# Patient Record
Sex: FEMALE | Race: WHITE | NOT HISPANIC OR LATINO | Employment: FULL TIME | URBAN - METROPOLITAN AREA
[De-identification: names, ages, dates, MRNs, and addresses within clinical notes are randomized per-mention and may not be internally consistent; named-entity substitution may affect disease eponyms.]

---

## 2017-01-03 ENCOUNTER — TRANSFERRED RECORDS (OUTPATIENT)
Dept: HEALTH INFORMATION MANAGEMENT | Facility: CLINIC | Age: 37
End: 2017-01-03

## 2017-02-26 ENCOUNTER — OFFICE VISIT (OUTPATIENT)
Dept: URGENT CARE | Facility: URGENT CARE | Age: 37
End: 2017-02-26
Payer: COMMERCIAL

## 2017-02-26 VITALS
SYSTOLIC BLOOD PRESSURE: 102 MMHG | TEMPERATURE: 98.7 F | HEART RATE: 76 BPM | WEIGHT: 132.7 LBS | BODY MASS INDEX: 20.83 KG/M2 | OXYGEN SATURATION: 98 % | DIASTOLIC BLOOD PRESSURE: 62 MMHG | HEIGHT: 67 IN

## 2017-02-26 DIAGNOSIS — R07.0 THROAT PAIN: Primary | ICD-10-CM

## 2017-02-26 DIAGNOSIS — J06.9 VIRAL URI WITH COUGH: ICD-10-CM

## 2017-02-26 LAB
DEPRECATED S PYO AG THROAT QL EIA: NORMAL
MICRO REPORT STATUS: NORMAL
SPECIMEN SOURCE: NORMAL

## 2017-02-26 PROCEDURE — 87081 CULTURE SCREEN ONLY: CPT | Performed by: FAMILY MEDICINE

## 2017-02-26 PROCEDURE — 99213 OFFICE O/P EST LOW 20 MIN: CPT | Performed by: FAMILY MEDICINE

## 2017-02-26 PROCEDURE — 87880 STREP A ASSAY W/OPTIC: CPT | Performed by: FAMILY MEDICINE

## 2017-02-26 RX ORDER — CODEINE PHOSPHATE AND GUAIFENESIN 10; 100 MG/5ML; MG/5ML
1 SOLUTION ORAL EVERY 4 HOURS PRN
Qty: 120 ML | Refills: 0 | Status: SHIPPED | OUTPATIENT
Start: 2017-02-26 | End: 2017-04-20

## 2017-02-26 NOTE — MR AVS SNAPSHOT
After Visit Summary   2/26/2017    Blas Perkins    MRN: [id number]           Patient Information     Date Of Birth          1980        Visit Information        Provider Department      2/26/2017 11:00 AM Gia Alvarez MD [facility]        Today's Diagnoses     Throat pain    -  1    Viral URI with cough           Follow-ups after your visit        Your next 10 appointments already scheduled     Mar 13, 2017  7:45 AM CDT   (Arrive by 7:30 AM)   RETURN PLASTICS with Jorge Luis Desir MD   [facility] Ophthalmology ([facility])    [address]  4th Floor  [facility] [id number] [phone]            Apr 14, 2017  8:30 AM CDT   RETURN NEURO with Felix Tidwell MD   Eye Clinic ([facility])    [facility]  [address]  [address] [address]  [facility] [id number] [phone]              Who to contact     If you have questions or need follow up information about today's clinic visit or your schedule please contact [facility] directly at [phone].  Normal or non-critical lab and imaging results will be communicated to you by MyChart, letter or phone within 4 business days after the clinic has received the results. If you do not hear from us within 7 days, please contact the clinic through MyChart or phone. If you have a critical or abnormal lab result, we will notify you by phone as soon as possible.  Submit refill requests through [facility] or call your pharmacy and they will forward the refill request to us. Please allow 3 business days for your refill to be completed.          Additional Information About Your Visit        MyChart Information     [facility] gives you secure access to your electronic health record. If you see a primary care provider, you can also send messages to your care team and make appointments. If you have questions, please call your primary  "care clinic.  If you do not have a primary care provider, please call [phone] and they will assist you.        Care EveryWhere ID     This is your Care EveryWhere ID. This could be used by other organizations to access your [place] medical records  [id number]        Your Vitals Were     Pulse Temperature Height Pulse Oximetry BMI (Body Mass Index)       76 98.7  F (37.1  C) (Oral) 5' 7\" (1.702 m) 98% 20.78 kg/m2        Blood Pressure from Last 3 Encounters:   02/26/17 102/62   12/21/15 100/54   11/30/15 110/62    Weight from Last 3 Encounters:   02/26/17 132 lb 11.2 oz (60.2 kg)   12/21/15 139 lb (63 kg)   11/30/15 134 lb (60.8 kg)              We Performed the Following     Beta strep group A culture     Rapid strep screen          Today's Medication Changes          These changes are accurate as of: 2/26/17 12:11 PM.  If you have any questions, ask your nurse or doctor.               Start taking these medicines.        Dose/Directions    guaiFENesin-codeine 100-10 MG/5ML Soln solution   Commonly known as:  ROBITUSSIN AC   Used for:  Viral URI with cough   Started by:  Gia Alvarez MD        Dose:  1 tsp.   Take 5 mLs by mouth every 4 hours as needed for cough   Quantity:  120 mL   Refills:  0            Where to get your medicines      Some of these will need a paper prescription and others can be bought over the counter.  Ask your nurse if you have questions.     Bring a paper prescription for each of these medications     guaiFENesin-codeine 100-10 MG/5ML Soln solution                Primary Care Provider Office Phone # Fax #    Dong Antonio [phone]       [facility] [address]  [facility] [address]        Thank you!     Thank you for choosing [facility]  for your care. Our goal is always to provide you with excellent care. Hearing back from our patients is one way we can continue to improve our services. Please take a few minutes to " complete the written survey that you may receive in the mail after your visit with us. Thank you!             Your Updated Medication List - Protect others around you: Learn how to safely use, store and throw away your medicines at www.disposemymeds.org.          This list is accurate as of: 2/26/17 12:11 PM.  Always use your most recent med list.                   Brand Name Dispense Instructions for use    ARTIFICIAL TEAR OP      Apply to eye At Bedtime       guaiFENesin-codeine 100-10 MG/5ML Soln solution    ROBITUSSIN AC    120 mL    Take 5 mLs by mouth every 4 hours as needed for cough       phenytoin 100 MG CR capsule    DILANTIN

## 2017-02-26 NOTE — NURSING NOTE
"Chief Complaint   Patient presents with     Pharyngitis     sore throat, fever 3x days        Initial /62  Pulse 76  Temp 98.7  F (37.1  C) (Oral)  Ht 5' 7\" (1.702 m)  Wt 132 lb 11.2 oz (60.2 kg)  SpO2 98%  BMI 20.78 kg/m2 Estimated body mass index is 20.78 kg/(m^2) as calculated from the following:    Height as of this encounter: 5' 7\" (1.702 m).    Weight as of this encounter: 132 lb 11.2 oz (60.2 kg).  Medication Reconciliation: complete    "

## 2017-02-28 LAB
BACTERIA SPEC CULT: NORMAL
MICRO REPORT STATUS: NORMAL
SPECIMEN SOURCE: NORMAL

## 2017-03-13 ENCOUNTER — OFFICE VISIT (OUTPATIENT)
Dept: OPHTHALMOLOGY | Facility: CLINIC | Age: 37
End: 2017-03-13

## 2017-03-13 VITALS — WEIGHT: 130 LBS | BODY MASS INDEX: 20.4 KG/M2 | HEIGHT: 67 IN

## 2017-03-13 DIAGNOSIS — D32.9 MENINGIOMA OF RIGHT SPHENOID WING INVOLVING CAVERNOUS SINUS (H): Primary | ICD-10-CM

## 2017-03-13 DIAGNOSIS — H05.20 PROPTOSIS: ICD-10-CM

## 2017-03-13 ASSESSMENT — VISUAL ACUITY
OS_CC+: -1
CORRECTION_TYPE: CONTACTS
OD_CC+: -1
OD_CC: 20/20
OS_CC: 20/20
METHOD: SNELLEN - LINEAR

## 2017-03-13 ASSESSMENT — MARGIN REFLEX DISTANCE
OD_MRD1: 4
OS_MRD1: 5

## 2017-03-13 ASSESSMENT — EXTERNAL EXAM - RIGHT EYE: OD_EXAM: PROPTOSIS

## 2017-03-13 ASSESSMENT — TONOMETRY
IOP_METHOD: ICARE
OS_IOP_MMHG: 13
OD_IOP_MMHG: 15

## 2017-03-13 ASSESSMENT — CONF VISUAL FIELD
OD_NORMAL: 1
METHOD: COUNTING FINGERS
OS_NORMAL: 1

## 2017-03-13 ASSESSMENT — SLIT LAMP EXAM - LIDS
COMMENTS: NORMAL
COMMENTS: NORMAL

## 2017-03-13 ASSESSMENT — LAGOPHTHALMOS
OS_LAGOPHTHALMOS: 0
OD_LAGOPHTHALMOS: 0

## 2017-03-13 NOTE — Clinical Note
Samuel Chow I'm signing her up for decompression. 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 Thanks Aayush

## 2017-03-13 NOTE — MR AVS SNAPSHOT
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)    -  1    Proptosis           Follow-ups after your visit        Your next 10 appointments already scheduled     Apr 14, 2017  8:30 AM CDT   RETURN NEURO with Felix Tidwell MD   Eye Clinic ([facility])    [address]  [address] [facility]  9th Fl Clin 9a  [facility] [id number] [phone]            Apr 20, 2017  1:00 PM CDT   (Arrive by 12:45 PM)   New Patient Visit with Blake Medina MD   [facility] Ear Nose and Throat ([facility])    [address]  4th Floor  [facility] [address] [phone]              Future tests that were ordered for you today     Open Future Orders        Priority Expected Expires Ordered    CT Maxillofacial w/o Contrast Routine  3/13/2018 3/13/2017            Who to contact     Please call your clinic at [phone] to:    Ask questions about your health    Make or cancel appointments    Discuss your medicines    Learn about your test results    Speak to your doctor   If you have compliments or concerns about an experience at your clinic, or if you wish to file a complaint, please contact [facility] Patient Relations at [phone] or email us at [email or web address]         Additional Information About Your Visit        MyChart Information     [employer] gives you secure access to your electronic health record. If you see a primary care provider, you can also send messages to your care team and make appointments. If you have questions, please call your primary care clinic.  If you do not have a primary care  "provider, please call [phone] and they will assist you.      [employer] is an electronic gateway that provides easy, online access to your medical records. With [employer], you can request a clinic appointment, read your test results, renew a prescription or communicate with your care team.     To access your existing account, please contact your [facility] or call [phone] for assistance.        Care EveryWhere ID     This is your Care EveryWhere ID. This could be used by other organizations to access your [place] medical records  [id number]        Your Vitals Were     Height BMI (Body Mass Index)                1.695 m (5' 6.75\") 20.51 kg/m2           Blood Pressure from Last 3 Encounters:   02/26/17 102/62   12/21/15 100/54   11/30/15 110/62    Weight from Last 3 Encounters:   03/13/17 59 kg (130 lb)   02/26/17 60.2 kg (132 lb 11.2 oz)   12/21/15 63 kg (139 lb)              We Performed the Following     External Photos OU (both eyes)     Renée-Operative Worksheet (Plastics)        Primary Care Provider Office Phone # Fax #    Dong Antonio [phone]       [facility] [address] [place] [address]        Thank you!     Thank you for choosing [facility] OPHTHALMOLOGY  for your care. Our goal is always to provide you with excellent care. Hearing back from our patients is one way we can continue to improve our services. Please take a few minutes to complete the written survey that you may receive in the mail after your visit with us. Thank you!             Your Updated Medication List - Protect others around you: Learn how to safely use, store and throw away your medicines at www.disposemymeds.org.          This list is accurate as of: 3/13/17  8:39 AM.  Always use your most recent med list.                   Brand Name Dispense Instructions for use    ARTIFICIAL TEAR OP      Apply to eye At Bedtime       guaiFENesin-codeine 100-10 MG/5ML Soln " solution    ROBITUSSIN AC    120 mL    Take 5 mLs by mouth every 4 hours as needed for cough       phenytoin 100 MG CR capsule    DILANTIN

## 2017-03-13 NOTE — PROGRESS NOTES
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  She has not noticed any significant changes to her vision or her eye position.      Assessment & Plan     Blas Perkins is a 36 year old female with the following diagnoses:   1. Proptosis       PLAN:  Right orbital decompression - medial and floor with magnolia and sonopet             Attending Physician Attestation:  I have seen and examined this patient .  I have confirmed and edited as necessary the chief complaint(s), history of present illness, review of systems, relevant history, and examination findings as documented by others.  I have personally reviewed the relevant tests, images, and reports as documented above.  I have confirmed and edited as necessary the assessment and plan and agree with this note.    - Jorge Luis Desir MD 8:28 AM 3/13/2017    Photographs were obtained to document the findings recorded under exam. These will be stored for future reference and comparison. The plan is documented under assessment and plan above.   - Jorge Luis Desir MD 8:28 AM 3/13/2017      Today with Blas Perkins, I reviewed the indications, risks, benefits, and alternatives of the proposed surgical procedure including, but not limited to, failure obtain the desired result  and need for additional surgery, bleeding, infection, loss of vision, loss of the eye, and the remote possibility of permanent damage to any organ system or death with the use of anesthesia.  I provided multiple opportunities for the questions, answered all questions to the best of my ability, and confirmed that my answers and my discussion were understood.     - Jorge Luis Desir MD 8:28 AM 3/13/2017

## 2017-04-12 ENCOUNTER — PRE VISIT (OUTPATIENT)
Dept: OTOLARYNGOLOGY | Facility: CLINIC | Age: 37
End: 2017-04-12

## 2017-04-12 NOTE — TELEPHONE ENCOUNTER
1.  Date/reason for appt:4/20/17, Orbital decompression eval  2.  Referring provider:ADDIE SWIFT  3.  Call to patient (Yes / No - short description): No, referred  4.  Previous care at / records requested from:   [facility]- office notes and imaging are in epic.

## 2017-04-20 ENCOUNTER — OFFICE VISIT (OUTPATIENT)
Dept: OTOLARYNGOLOGY | Facility: CLINIC | Age: 37
End: 2017-04-20

## 2017-04-20 DIAGNOSIS — H05.20 PROPTOSIS: Primary | ICD-10-CM

## 2017-04-20 RX ORDER — MULTIPLE VITAMINS W/ MINERALS TAB 9MG-400MCG
1 TAB ORAL DAILY
COMMUNITY
End: 2017-12-20

## 2017-04-20 NOTE — PATIENT INSTRUCTIONS
Plan of care:  You will here from Carolyn about a surgery date/time and a post op visit  Clinic contact information:  1. To schedule an appointment call [phone], option 1  2. To talk to the Triage RN call [phone], option 3  3. If you need to speak to Christina or get a message to your doctor on a Friday, call the triage RN  4. ChristinaRN: [phone]  5. Surgery scheduling:      Veda Figueroa: [phone]      Carolyn Shrestha: [phone]  6. Fax: [phone]  7. [phone]    Pre-op reminders:  1. Complete pre-op H+P within 30 days of surgery (recommend pre-op appointment 2 weeks prior to surgery date).   2.  needed on day of surgery.   3.Discuss all medications, including blood-thinning medications with PCP at pre-op appointment (Coumadin, Plavix, Aspirin, Ibuprofen/Motrin, Vitamin E and Fish Oil), which may need to be discontinued 7 days prior to surgery date.   4. Nothing to eat or drink after midnight the night before surgery.   5. Take shower or bath the morning of surgery and remove deodorant, cologne, scented lotion, makeup, nail polish and jewelry.     r1

## 2017-04-20 NOTE — LETTER
4/20/2017       RE: Blas Perkins  [address] [facility] [address]     Dear Colleague,    Thank you for referring your patient, Blas Perkins, to the [facility] EAR NOSE AND THROAT at [facility]. Please see a copy of my visit note below.      HISTORY OF PRESENT ILLNESS:  Ms. 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  She had a resection in September of 2015.  She had followup reconstructive surgery in December, 2015.  She subsequently had a fractionated CyberKnife radiosurgery to residual and growing tumor in September of 2016.  She has had stable vision and stable eye position but continues to have proptosis.  She was seen by Dr. Desir recently who recommended a right orbital decompression with an endoscopic approach to the medial wall and Dr. Desir performed an approach to the floor.  She is here for evaluation and recommendations.  Other than her meningioma she is quite healthy.  Denies having any medications other than multivitamins and artificial tears.      Last 2 Scores for Patient-Answered VHI Questionnaire  VHI Total Score 4/17/2017   VHI Total Score 10         Last 2 Scores for Patient-Answered CSI Questionnaire  CSI Total Score 4/17/2017   CSI Total Score 0       Last 2 Scores for Patient-Answered EAT Questionnaire  EAT Total Score 4/17/2017   EAT Total Score 0           PAST MEDICAL HISTORY:   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      OPTICAL TRACKING SYSTEM CRANIOTOMY, EXCISE TUMOR, COMBINED Right 9/24/2014    Procedure: COMBINED OPTICAL TRACKING SYSTEM CRANIOTOMY, EXCISE TUMOR;  Surgeon: Austin Mccallum MD;  Location:  OR       FAMILY HISTORY:   Family History   Problem Relation Age of Onset     Family History Negative Mother      Substance Abuse Mother      Family History Negative Father      Family History Negative Sister      Family History Negative Brother      DIABETES Maternal Grandfather      Substance Abuse Maternal Grandfather      Substance Abuse Maternal Grandmother      Unknown/Adopted Paternal Grandmother      Unknown/Adopted Paternal Grandfather      Glaucoma No family hx of      Macular Degeneration No family hx of        SOCIAL HISTORY:   Social History   Substance Use Topics     Smoking status: Current Some Day Smoker     Packs/day: 0.01     Years: 10.00     Types: Cigarettes     Start date: 12/20/1997     Smokeless tobacco: Never Used      Comment: 2-3 cigarettes daily     Alcohol use No      Comment: daily x 5 years-at least a bottle of wine   11/19/13 No alcohol use       REVIEW OF SYSTEMS: Ten point review of systems was performed and is negative except for:   [facility] ROS 4/7/2017   Eyes Visual loss        ALLERGIES: Chantix [varenicline]    MEDICATIONS:   Current Outpatient Prescriptions   Medication Sig Dispense Refill     multivitamin, therapeutic with minerals (MULTI-VITAMIN) TABS tablet Take 1 tablet by mouth daily       ARTIFICIAL TEAR OP Apply to eye At Bedtime             PHYSICAL EXAMINATION:  On examination today, she is awake, alert, in no apparent distress.  She has some mild  retraction of her tympanic membranes prior to prior infections.  Nasal exam shows a left septal spur and deviation of the nose to the right.  Examination of the oral cavity shows no suspicious lesions.  There is symmetric movement of the tongue and soft palate.  Pulse is regular.  Upper airway is clear.  Cranial nerves II-XII are grossly intact.      Nasal endoscopy was performed following topical anesthesia with 3% lidocaine and 0.25% phenylephrine.  Scope was passed bilaterally.  There is good access to the ostiomeatal complex on the right side.  No intranasal polyps or masses are seen.  On the left side, there is a septal spur, which is partially obstructing.  Again, no masses or suspicious lesions are seen.      IMAGING:  We did review her CT scan with her, which was obtained on March, 2017 demonstrating the reconstruction as well as the sinus and orbital anatomy.  I did review with her the nature of the procedure and what would be removed, the potential complications and the expected results.  She is quite understanding.  All questions were answered.  I believe she is a good candidate from an anatomic standpoint and we will be scheduling a time in conjunction with Dr. Desir for an orbital decompression with image guidance.       Again, thank you for allowing me to participate in the care of your patient.      Sincerely,    Blake Medina MD       cc: Jorge Luis Desir MD     [facility] [address]        Dong Antonio MD     [facility]     [address]  [address]       [place]/josue

## 2017-04-20 NOTE — NURSING NOTE
Chief Complaint   Patient presents with     Consult     New Orbital decompression eval      Pt states no pain today.    N Geronimo DUDLEYN

## 2017-04-20 NOTE — MR AVS SNAPSHOT
After Visit Summary   2017    Blas Perkins    MRN: [id number]           Patient Information     Date Of Birth          1980        Visit Information        Provider Department      2017 1:00 PM Blake Medina MD [facility] Ear Nose and Throat        Care Instructions    Plan of care:  You will here from Carolyn about a surgery date/time and a post op visit  Clinic contact information:  1. To schedule an appointment call [phone], option 1  2. To talk to the Triage RN call [phone], option 3  3. If you need to speak to Christina or get a message to your doctor on a Friday, call the triage RN  4. ChristinaRN: [phone]  5. Surgery scheduling:      Veda Figueroa: [phone]      Carolyn Shrestha: [phone]  6. Fax: [phone]  7. [phone]    Pre-op reminders:  1. Complete pre-op H+P within 30 days of surgery (recommend pre-op appointment 2 weeks prior to surgery date).   2.  needed on day of surgery.   3.Discuss all medications, including blood-thinning medications with PCP at pre-op appointment (Coumadin, Plavix, Aspirin, Ibuprofen/Motrin, Vitamin E and Fish Oil), which may need to be discontinued 7 days prior to surgery date.   4. Nothing to eat or drink after midnight the night before surgery.   5. Take shower or bath the morning of surgery and remove deodorant, cologne, scented lotion, makeup, nail polish and jewelry.             Follow-ups after your visit        Your next 10 appointments already scheduled     2017  3:00 PM CDT   RETURN NEURO with Felix Tidwell MD   Eye Clinic ([facility])    [facility]  [address]  [address] [address] [place] [address] [phone]              Who to contact     Please call your clinic at [phone] to:    Ask questions about your health    Make or cancel appointments    Discuss your medicines    Learn about your test results    Speak to your doctor   If you  have compliments or concerns about an experience at your clinic, or if you wish to file a complaint, please contact [facility] Patient Relations at [phone] or email us at [email or web address]         Additional Information About Your Visit        MyChart Information     [employer] gives you secure access to your electronic health record. If you see a primary care provider, you can also send messages to your care team and make appointments. If you have questions, please call your primary care clinic.  If you do not have a primary care provider, please call [phone] and they will assist you.      [employer] is an electronic gateway that provides easy, online access to your medical records. With [employer], you can request a clinic appointment, read your test results, renew a prescription or communicate with your care team.     To access your existing account, please contact your [facility] or call [phone] for assistance.        Care EveryWhere ID     This is your Care EveryWhere ID. This could be used by other organizations to access your [place] medical records  [id number]         Blood Pressure from Last 3 Encounters:   02/26/17 102/62   12/21/15 100/54   11/30/15 110/62    Weight from Last 3 Encounters:   03/13/17 59 kg (130 lb)   02/26/17 60.2 kg (132 lb 11.2 oz)   12/21/15 63 kg (139 lb)              Today, you had the following     No orders found for display       Primary Care Provider Office Phone # Fax #    Dong Antonio -[phone]       [facility] [address] [facility] [address]        Thank you!     Thank you for choosing [facility] EAR NOSE AND THROAT  for your care. Our goal is always to provide you with excellent care. Hearing back from our patients is one way we can continue to improve our services. Please take a few minutes to complete the written survey that you may receive in the mail after  your visit with us. Thank you!             Your Updated Medication List - Protect others around you: Learn how to safely use, store and throw away your medicines at www.disposemymeds.org.          This list is accurate as of: 4/20/17  1:48 PM.  Always use your most recent med list.                   Brand Name Dispense Instructions for use    ARTIFICIAL TEAR OP      Apply to eye At Bedtime       Multi-vitamin Tabs tablet      Take 1 tablet by mouth daily

## 2017-04-20 NOTE — NURSING NOTE
Relevant Diagnosis: Meningioma of right sinus  Teaching Topic: Right orbital wall decompression  Person(s) involved in teaching:  Patient     Teaching Concerns Addressed:  Pre op teaching included the need for an H&P, NPO status pre op, hospital routines, expected recovery, activity  restrictions, antimicrobial scrub, s/s of infection, pain control methods and the importance of follow up appointments.  The patient voiced an understanding of all instructions and will call with questions.     Motivation Level:  Asks Questions:   Yes  Eager to Learn:   Yes  Cooperative:   Yes  Receptive (willing/able to accept information):   Yes     Patient  demonstrates understanding of the following:  Reason for the appointment, diagnosis and treatment plan:   Yes  Knowledge of proper use of medications and conditions for which they are ordered (with special attention to potential side effects or drug interactions):   Yes  Which situations necessitate calling provider and whom to contact:   Yes        Proper use and care of  (medical equip, care aids, etc.):   NA  Nutritional needs and diet plan:   Yes  Pain management techniques:   Yes  Patient instructed on hand hygiene:  Yes  How and/when to access community resources:   NA     Infection Prevention:  Patient   demonstrates understanding of the following:  Surgical procedure site care taught   Signs and symptoms of infection taught Yes       Instructional Materials Used/Given: Pre op booklet.

## 2017-04-20 NOTE — PROGRESS NOTES
HISTORY OF PRESENT ILLNESS:  Ms. 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  She had a resection in September of 2015.  She had followup reconstructive surgery in December, 2015.  She subsequently had a fractionated CyberKnife radiosurgery to residual and growing tumor in September of 2016.  She has had stable vision and stable eye position but continues to have proptosis.  She was seen by Dr. Desir recently who recommended a right orbital decompression with an endoscopic approach to the medial wall and Dr. Desir performed an approach to the floor.  She is here for evaluation and recommendations.  Other than her meningioma she is quite healthy.  Denies having any medications other than multivitamins and artificial tears.      Last 2 Scores for Patient-Answered VHI Questionnaire  VHI Total Score 4/17/2017   VHI Total Score 10         Last 2 Scores for Patient-Answered CSI Questionnaire  CSI Total Score 4/17/2017   CSI Total Score 0       Last 2 Scores for Patient-Answered EAT Questionnaire  EAT Total Score 4/17/2017   EAT Total Score 0           PAST MEDICAL HISTORY:   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      OPTICAL TRACKING SYSTEM CRANIOTOMY, EXCISE TUMOR, COMBINED Right 9/24/2014    Procedure: COMBINED OPTICAL TRACKING SYSTEM CRANIOTOMY, EXCISE TUMOR;  Surgeon: Austin Mccallum MD;  Location:  OR       FAMILY HISTORY:   Family History   Problem Relation Age of Onset     Family History Negative Mother      Substance Abuse Mother      Family History Negative Father      Family History Negative Sister      Family History Negative Brother      DIABETES Maternal Grandfather      Substance Abuse Maternal Grandfather      Substance Abuse Maternal Grandmother      Unknown/Adopted Paternal Grandmother      Unknown/Adopted Paternal Grandfather      Glaucoma No family hx of      Macular Degeneration No family hx of        SOCIAL HISTORY:   Social History   Substance Use Topics     Smoking status: Current Some Day Smoker     Packs/day: 0.01     Years: 10.00     Types: Cigarettes     Start date: 12/20/1997     Smokeless tobacco: Never Used      Comment: 2-3 cigarettes daily     Alcohol use No      Comment: daily x 5 years-at least a bottle of wine   11/19/13 No alcohol use       REVIEW OF SYSTEMS: Ten point review of systems was performed and is negative except for:   [facility] ROS 4/7/2017   Eyes Visual loss        ALLERGIES: Chantix [varenicline]    MEDICATIONS:   Current Outpatient Prescriptions   Medication Sig Dispense Refill     multivitamin, therapeutic with minerals (MULTI-VITAMIN) TABS tablet Take 1 tablet by mouth daily       ARTIFICIAL TEAR OP Apply to eye At Bedtime             PHYSICAL EXAMINATION:  On examination today, she is awake, alert, in no apparent distress.  She has some mild retraction of her tympanic membranes prior to prior infections.  Nasal exam shows a left septal spur and deviation of the nose to the right.  Examination of the oral cavity shows no suspicious lesions.  There is symmetric movement of the tongue and soft palate.  Pulse is regular.  Upper airway is  clear.  Cranial nerves II-XII are grossly intact.      Nasal endoscopy was performed following topical anesthesia with 3% lidocaine and 0.25% phenylephrine.  Scope was passed bilaterally.  There is good access to the ostiomeatal complex on the right side.  No intranasal polyps or masses are seen.  On the left side, there is a septal spur, which is partially obstructing.  Again, no masses or suspicious lesions are seen.      IMAGING:  We did review her CT scan with her, which was obtained on March, 2017 demonstrating the reconstruction as well as the sinus and orbital anatomy.  I did review with her the nature of the procedure and what would be removed, the potential complications and the expected results.  She is quite understanding.  All questions were answered.  I believe she is a good candidate from an anatomic standpoint and we will be scheduling a time in conjunction with Dr. Desir for an orbital decompression with image guidance.      cc: Jorge Luis Desir MD     [facility] [address]        Dong Antonio MD     [facility]     [address] [place] [address]  [id number]       [place]/josue

## 2017-04-21 PROBLEM — H05.20 PROPTOSIS: Status: ACTIVE | Noted: 2017-04-21

## 2017-04-26 DIAGNOSIS — H47.10 PAPILLEDEMA: Primary | ICD-10-CM

## 2017-04-27 ENCOUNTER — OFFICE VISIT (OUTPATIENT)
Dept: OPHTHALMOLOGY | Facility: CLINIC | Age: 37
End: 2017-04-27
Attending: OPHTHALMOLOGY
Payer: COMMERCIAL

## 2017-04-27 DIAGNOSIS — H47.10 PAPILLEDEMA: ICD-10-CM

## 2017-04-27 DIAGNOSIS — D32.9 MENINGIOMA OF RIGHT SPHENOID WING INVOLVING CAVERNOUS SINUS (H): Primary | ICD-10-CM

## 2017-04-27 PROCEDURE — 92083 EXTENDED VISUAL FIELD XM: CPT | Mod: ZF | Performed by: OPHTHALMOLOGY

## 2017-04-27 PROCEDURE — 92133 CPTRZD OPH DX IMG PST SGM ON: CPT | Mod: ZF | Performed by: OPHTHALMOLOGY

## 2017-04-27 PROCEDURE — 99213 OFFICE O/P EST LOW 20 MIN: CPT | Mod: 25

## 2017-04-27 ASSESSMENT — VISUAL ACUITY
OS_CC+: -2
METHOD: SNELLEN - LINEAR
CORRECTION_TYPE: CONTACTS
OS_CC: 20/20
OD_CC: 20/20

## 2017-04-27 ASSESSMENT — TONOMETRY
OS_IOP_MMHG: 20
IOP_METHOD: ICARE
OD_IOP_MMHG: 18

## 2017-04-27 ASSESSMENT — CONF VISUAL FIELD
OD_NORMAL: 1
OS_NORMAL: 1

## 2017-04-27 ASSESSMENT — CUP TO DISC RATIO
OS_RATIO: 0.2
OD_RATIO: 0.0

## 2017-04-27 ASSESSMENT — SLIT LAMP EXAM - LIDS
COMMENTS: NORMAL
COMMENTS: NORMAL

## 2017-04-27 ASSESSMENT — EXTERNAL EXAM - RIGHT EYE: OD_EXAM: PROPTOSIS

## 2017-04-27 ASSESSMENT — EXTERNAL EXAM - LEFT EYE: OS_EXAM: NORMAL

## 2017-04-27 NOTE — NURSING NOTE
Chief Complaints and History of Present Illnesses   Patient presents with     Neurologic Problem     4 m fu     HPI    Affected eye(s):  Both   Symptoms:     Blurred vision            Comments:     1. 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  Optic disc edema in the right eye on exam today in the absence of optic nerve dysfunction    No change since the last visit.   Vision is good OU.     Sandi CLEVELAND 3:01 PM April 27, 2017

## 2017-04-27 NOTE — MR AVS SNAPSHOT
After Visit Summary   4/27/2017    Blas Perkins    MRN: [id number]           Patient Information     Date Of Birth          1980        Visit Information        Provider Department      4/27/2017 3:00 PM Felix Tidwell MD [facility]        Today's Diagnoses     Papilledema           Follow-ups after your visit        Your next 10 appointments already scheduled     Jul 07, 2017   Procedure with Blake Medina MD   [facility] ([facility])    [address] [place] [address]  5th Floor  [facility] [id number] [phone]           Located in the Clinics and Surgery Center at [address] [place] [address].   parking is very convenient and highly recommended.  is a $6 flat rate fee.  Both  and self parkers should enter the main arrival plaza from [facility]; parking attendants will direct you based on your parking preference.            Jul 24, 2017  1:30 PM CDT   (Arrive by 1:15 PM)   Post-Op with Jorge Luis Desir MD   [facility] Ophthalmology ([facility])    [address] [place] [address]  4th Floor  [facility] [id number] [phone]            Oct 25, 2017  3:00 PM CDT   RETURN NEURO with Felix Tidwell MD   Eye Clinic ([facility])    [address]  [address] [facility]  9th Fl Clin 9a  [facility] [address] [phone]              Future tests that were ordered for you today     Open Future Orders        Priority Expected Expires Ordered    OCT Optic Nerve RNFL Spectralis OU (both eyes) Routine  6/25/2017 4/26/2017            Who to contact     Please call your clinic at [phone] to:    Ask questions about your health    Make or cancel appointments    Discuss your medicines    Learn about your test results    Speak to your doctor   If you have compliments or concerns about an experience at your clinic, or if you wish to file a complaint,  please contact [facility] Patient Relations at [phone] or email us at [email or web address]         Additional Information About Your Visit        MyChart Information     [employer] gives you secure access to your electronic health record. If you see a primary care provider, you can also send messages to your care team and make appointments. If you have questions, please call your primary care clinic.  If you do not have a primary care provider, please call [phone] and they will assist you.      [employer] is an electronic gateway that provides easy, online access to your medical records. With [employer], you can request a clinic appointment, read your test results, renew a prescription or communicate with your care team.     To access your existing account, please contact your [facility] Clinic or call [phone] for assistance.        Care EveryWhere ID     This is your Care EveryWhere ID. This could be used by other organizations to access your [facility] medical records  [id number]         Blood Pressure from Last 3 Encounters:   02/26/17 102/62   12/21/15 100/54   11/30/15 110/62    Weight from Last 3 Encounters:   03/13/17 59 kg (130 lb)   02/26/17 60.2 kg (132 lb 11.2 oz)   12/21/15 63 kg (139 lb)              We Performed the Following     Glaucoma Top OU     IOP Measurement        Primary Care Provider Office Phone # Fax #    Dong Antonio [phone]       [facility] [address] [facility] [address]        Thank you!     Thank you for choosing EYE CLINIC  for your care. Our goal is always to provide you with excellent care. Hearing back from our patients is one way we can continue to improve our services. Please take a few minutes to complete the written survey that you may receive in the mail after your visit with us. Thank you!             Your Updated Medication List - Protect others around you: Learn how to  safely use, store and throw away your medicines at www.disposemymeds.org.          This list is accurate as of: 4/27/17  4:02 PM.  Always use your most recent med list.                   Brand Name Dispense Instructions for use    ARTIFICIAL TEAR OP      Apply to eye At Bedtime       Multi-vitamin Tabs tablet      Take 1 tablet by mouth daily

## 2017-04-27 NOTE — LETTER
2017    RE: Blas Perkins  : 1980  MRN: [id number]    Dear Providers,    I saw our mutual patient, Blas Perkins, in follow-up in my clinic recently.  After a thorough neuro-ophthalmic history and examination, I came to the following conclusions:       1. 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      - status post radiation therapy in 2016 ( cyberknife by Dr. Quintana)      - She reports no changes in her vision since the last visit. She saw Dr. Desir and she is scheduled for orbital decompression (2 wall) on 2017 with Anastasia Medina and Thang.  - papilledema in the right eye remarkably better today on OCT and exam- I suspect a response to the radiation therapy.     - Best corrected visual acuity, and color vision remains essentially normal in the right eye!    - She will have the surgery as scheduled and I will see her 3 months following that or sooner if needed.       For further exam details, please feel free to contact our office for additional records.  If you wish to contact me regarding this patient please email me at [email or web address] or give my clinic a call to arrange a phone conversation.    Sincerely,    Felix Tidwell MD  , Neuro-Ophthalmology and Adult Strabismus  Department of Ophthalmology and Visual Neurosciences  [facility]

## 2017-04-27 NOTE — PROGRESS NOTES
ATTENDING ASSESSMENT AND PLAN:       1. 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      - status post radiation therapy in September 2016 ( cyberknife by Dr. Quintana)      - She reports no changes in her vision since the last visit. She saw Dr. Desir and she is scheduled for orbital decompression (2 wall) on 7/7/2017 with Anastasia Medina and Thang.  - papilledema in the right eye remarkably better today on OCT and exam- I suspect a response to the radiation therapy.     - Best corrected visual acuity, and color vision remains essentially normal in the right eye!    - She will have the surgery as scheduled and I will see her 3 months following that or sooner if needed.        Complete documentation of historical and exam elements from today's encounter can be found in the full encounter summary report (not reduplicated in this progress note).  I personally obtained the chief complaint(s) and history of present illness.  I confirmed and edited as necessary the review of systems, past medical/surgical history, family history, social history, and examination findings as documented by others; and I examined the patient myself.  I personally reviewed the relevant tests, images, and reports as documented above.  I formulated and edited as necessary the assessment and plan and discussed the findings and management plan with the patient and family   Felix Tidwell MD  3:48 PM  4/27/2017

## 2017-06-10 ENCOUNTER — HEALTH MAINTENANCE LETTER (OUTPATIENT)
Age: 37
End: 2017-06-10

## 2017-06-23 ENCOUNTER — OFFICE VISIT (OUTPATIENT)
Dept: INTERNAL MEDICINE | Facility: CLINIC | Age: 37
End: 2017-06-23
Payer: COMMERCIAL

## 2017-06-23 VITALS
DIASTOLIC BLOOD PRESSURE: 58 MMHG | OXYGEN SATURATION: 99 % | WEIGHT: 136 LBS | HEIGHT: 67 IN | SYSTOLIC BLOOD PRESSURE: 110 MMHG | TEMPERATURE: 98.4 F | BODY MASS INDEX: 21.35 KG/M2 | HEART RATE: 62 BPM

## 2017-06-23 DIAGNOSIS — Z01.818 PREOP GENERAL PHYSICAL EXAM: Primary | ICD-10-CM

## 2017-06-23 LAB
ERYTHROCYTE [DISTWIDTH] IN BLOOD BY AUTOMATED COUNT: 12.7 % (ref 10–15)
HCT VFR BLD AUTO: 39.5 % (ref 35–47)
HGB BLD-MCNC: 12.9 G/DL (ref 11.7–15.7)
MCH RBC QN AUTO: 32.2 PG (ref 26.5–33)
MCHC RBC AUTO-ENTMCNC: 32.7 G/DL (ref 31.5–36.5)
MCV RBC AUTO: 99 FL (ref 78–100)
PLATELET # BLD AUTO: 226 10E9/L (ref 150–450)
RBC # BLD AUTO: 4.01 10E12/L (ref 3.8–5.2)
WBC # BLD AUTO: 6.4 10E9/L (ref 4–11)

## 2017-06-23 PROCEDURE — 85027 COMPLETE CBC AUTOMATED: CPT | Performed by: INTERNAL MEDICINE

## 2017-06-23 PROCEDURE — 36415 COLL VENOUS BLD VENIPUNCTURE: CPT | Performed by: INTERNAL MEDICINE

## 2017-06-23 PROCEDURE — 99214 OFFICE O/P EST MOD 30 MIN: CPT | Performed by: INTERNAL MEDICINE

## 2017-06-23 NOTE — MR AVS SNAPSHOT
After Visit Summary   6/23/2017    Blas Perkins    MRN: [id number]           Patient Information     Date Of Birth          1980        Visit Information        Provider Department      6/23/2017 9:30 AM Dong Antonio MD [facility]        Today's Diagnoses     Preop general physical exam    -  1      Care Instructions     Stop smoking between now and surgery. If not able to do on own, consider OTC nicotine patch 7 or 14mg patch daily  Stop Multivitamin 1 week before surgery  No Advil/Ibuprofen or Aleve for 3 days prior to surgery. OK for Tylenol  Nothing to eat/drink after midnight prior to surgery  CBC lab  Pap/pelvic exam appt in the next 1-2 months          Follow-ups after your visit        Your next 10 appointments already scheduled     Jul 07, 2017   Procedure with Blake Medina MD   [facility] ([facility])    [address] [place] [address]  5th [facility] [address] [phone]           Located in the Clinics and Surgery Center at [address] [place] [address].   parking is very convenient and highly recommended.  is a $6 flat rate fee.  Both  and self parkers should enter the main arrival plaza from [facility]; parking attendants will direct you based on your parking preference.            Jul 24, 2017  1:30 PM CDT   (Arrive by 1:15 PM)   Post-Op with Jorge Luis Desir MD   [facility]hthalmology ([facility])    [address] [place] [address]  4th Floor  [facility] [id number] [phone]            Oct 25, 2017  3:00 PM CDT   RETURN NEURO with Felix Tidwell MD   Eye Clinic ([facility])    [address]  [address] [facility]  9th Fl Clin 9a  [facility] [id number] [phone]              Who to contact     If you have questions or need follow up information about today's clinic visit or your schedule please  "contact [facility] directly at [phone].  Normal or non-critical lab and imaging results will be communicated to you by MyChart, letter or phone within 4 business days after the clinic has received the results. If you do not hear from us within 7 days, please contact the clinic through MyChart or phone. If you have a critical or abnormal lab result, we will notify you by phone as soon as possible.  Submit refill requests through [employer] or call your pharmacy and they will forward the refill request to us. Please allow 3 business days for your refill to be completed.          Additional Information About Your Visit        [employer] Information     [employer] gives you secure access to your electronic health record. If you see a primary care provider, you can also send messages to your care team and make appointments. If you have questions, please call your primary care clinic.  If you do not have a primary care provider, please call [phone] and they will assist you.        Care EveryWhere ID     This is your Care EveryWhere ID. This could be used by other organizations to access your [facility] medical records  [id number]        Your Vitals Were     Pulse Temperature Height Pulse Oximetry BMI (Body Mass Index)       62 98.4  F (36.9  C) (Oral) 5' 6.75\" (1.695 m) 99% 21.46 kg/m2        Blood Pressure from Last 3 Encounters:   06/23/17 110/58   02/26/17 102/62   12/21/15 100/54    Weight from Last 3 Encounters:   06/23/17 136 lb (61.7 kg)   03/13/17 130 lb (59 kg)   02/26/17 132 lb 11.2 oz (60.2 kg)              We Performed the Following     CBC with platelets        Primary Care Provider Office Phone # Fax #    Dong Antonio [phone]       [facility] [address] [facility] [address]        Equal Access to Services     SHIKHA ALLEN AH: Guido bellao Soomaali, waaxda luqadaha, qaybta kaalmajerry gerard, pooja palacio. So " [facility] [phone].    ATENCIÓN: Si devendrala lizandro, tiene a ortiz disposición servicios gratuitos de asistencia lingüística. Roslyn cowart [phone].    We comply with applicable federal civil rights laws and [place] laws. We do not discriminate on the basis of race, color, national origin, age, disability sex, sexual orientation or gender identity.            Thank you!     Thank you for choosing [facility]  for your care. Our goal is always to provide you with excellent care. Hearing back from our patients is one way we can continue to improve our services. Please take a few minutes to complete the written survey that you may receive in the mail after your visit with us. Thank you!             Your Updated Medication List - Protect others around you: Learn how to safely use, store and throw away your medicines at www.disposemymeds.org.          This list is accurate as of: 6/23/17 10:19 AM.  Always use your most recent med list.                   Brand Name Dispense Instructions for use Diagnosis    ARTIFICIAL TEAR OP      Apply to eye At Bedtime        Multi-vitamin Tabs tablet      Take 1 tablet by mouth daily

## 2017-06-23 NOTE — NURSING NOTE
"Chief Complaint   Patient presents with     Pre-Op Exam       Initial /58 (BP Location: Left arm, Patient Position: Chair, Cuff Size: Adult Regular)  Pulse 62  Temp 98.4  F (36.9  C) (Oral)  Ht 5' 6.75\" (1.695 m)  Wt 136 lb (61.7 kg)  SpO2 99%  BMI 21.46 kg/m2 Estimated body mass index is 21.46 kg/(m^2) as calculated from the following:    Height as of this encounter: 5' 6.75\" (1.695 m).    Weight as of this encounter: 136 lb (61.7 kg).  Medication Reconciliation: complete    "

## 2017-06-23 NOTE — PATIENT INSTRUCTIONS
Stop smoking between now and surgery. If not able to quit on your own, consider OTC nicotine patch 7 or 14mg patch daily  Stop Multivitamin 1 week before surgery  No Advil/Ibuprofen or Aleve for 3 days prior to surgery. OK for Tylenol  Nothing to eat/drink after midnight prior to surgery  Pap/pelvic exam appt in the next 1-2 months

## 2017-06-23 NOTE — PROGRESS NOTES
[address] [address]  [phone]  Dept: [phone]    PRE-OP EVALUATION:  Today's date: 2017    Blas Perkins (: 1980) presents for pre-operative evaluation assessment as requested by Dr. Desir and Dr. Medina.  She requires evaluation and anesthesia risk assessment prior to undergoing surgery/procedure for treatment of right eye proptosis .  Proposed procedure: Right Orbital Decompression with [place] Navigation and Sonopet, Medial Wall Decompression    Date of Surgery/ Procedure: 2017  Time of Surgery/ Procedure: 7:30am  Hospital/Surgical Facility: [facility]    Primary Physician: Dong Antonio  Type of Anesthesia Anticipated: to be determined    Patient has a Health Care Directive or Living Will:  NO    1. NO - Do you have a history of heart attack, stroke, stent, bypass or surgery on an artery in the head, neck, heart or legs?  2. NO - Do you ever have any pain or discomfort in your chest?  3. NO - Do you have a history of  Heart Failure?  4. NO - Are you troubled by shortness of breath when: walking on the level, up a slight hill or at night?  5. NO - Do you currently have a cold, bronchitis or other respiratory infection?  6. NO - Do you have a cough, shortness of breath or wheezing?  7. NO - Do you sometimes get pains in the calves of your legs when you walk?  8. NO - Do you or anyone in your family have previous history of blood clots?  9. NO - Do you or does anyone in your family have a serious bleeding problem such as prolonged bleeding following surgeries or cuts?  10. Yes once after a previous surgery - Have you ever had problems with anemia or been told to take iron pills?  11. NO - Have you had any abnormal blood loss such as black, tarry or bloody stools, or abnormal vaginal bleeding?  12. NO - Have you ever had a blood transfusion?  13. NO - Have you or any of your relatives ever had problems with  anesthesia?  14. NO - Do you have sleep apnea, excessive snoring or daytime drowsiness?  15. NO - Do you have any prosthetic heart valves?  16. NO - Do you have prosthetic joints?  17. NO - Is there any chance that you may be pregnant?      HPI:                                                      HISTORY OF PRESENT ILLNESS:  Hx of a  sphenoid wing meningioma.  She had a resection in September of 2015 and  followup reconstructive surgery in December, 2015.  She subsequently had a fractionated CyberKnife radiosurgery for residual and growing tumor in September of 2016.  She has had stable vision and stable eye position but continues to have proptosis.   Following further consultations with Jael Desir and Carol, pt has elected to undergo further surgical correction as above. See below for other medical issues.   Pt has good exercise tolerance and running daily for 3 miles. No chest pain or shortness of breath with that activity.  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     History of reversal of tubal ligation 10/29/2014     Chemical dependency (H) 09/20/2013     TMJ (temporomandibular joint syndrome) 01/23/2012     Anxiety 01/23/2012     CARDIOVASCULAR SCREENING; LDL GOAL LESS THAN 160 10/31/2010     Tobacco abuse       Past Medical History:   Diagnosis Date     Alcohol abuse October 2013     Anemia 9/30/2014     Anxiety 1/23/2012     History of colposcopy with cervical biopsy 02/16/06,08/31/06    MORGAN 1     HSIL (high grade squamous intraepithelial lesion)  "on Pap smear of cervix 11/17/2005     Meningioma (H) June 2014    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      OPTICAL TRACKING SYSTEM CRANIOTOMY, EXCISE TUMOR, COMBINED Right 9/24/2014    Procedure: COMBINED OPTICAL TRACKING SYSTEM CRANIOTOMY, EXCISE TUMOR;  Surgeon: Austin Mccallum MD;  Location: [facility] OR     Current Outpatient Prescriptions   Medication Sig Dispense Refill     multivitamin, therapeutic with minerals (MULTI-VITAMIN) TABS tablet Take 1 tablet by mouth daily       ARTIFICIAL TEAR OP Apply to eye At Bedtime       OTC products:  occ Advil    Allergies   Allergen Reactions     Chantix [Varenicline]      Increased \"crabbiness\"      Latex Allergy: NO    Social History   Substance Use Topics     Smoking status: Current Some Day Smoker     Packs/day: 0.01     Years: 10.00     Types: Cigarettes     Start date: 12/20/1997     Smokeless tobacco: Never Used      Comment: 2-3 cigarettes daily     Alcohol use No      Comment: daily x 5 years-at least a bottle of wine   11/19/13 No alcohol use     History   Drug Use No       REVIEW OF SYSTEMS:                                                    C: NEGATIVE for fever, chills. Mild weight gain  I: NEGATIVE for worrisome rashes, moles or lesions  E: See HPI. Normal vision left eye with contacts. Some mildly reduced central vision  right eye which pt states is chronic but improved. Peripheral vision normal  E/M: NEGATIVE for ear, mouth and throat problems  R: NEGATIVE for significant cough " "or SOB. Smoking 1/3 ppd  B: NEGATIVE for masses, tenderness or discharge  CV: NEGATIVE for chest pain, palpitations or peripheral edema  GI: NEGATIVE for nausea, abdominal pain, heartburn, or change in bowel habits  : NEGATIVE for frequency, dysuria, or hematuria. No vaginal sx. Menses regular  M: NEGATIVE for significant arthralgias or myalgia  N: NEGATIVE for weakness, dizziness or paresthesias  E: NEGATIVE for temperature intolerance, skin/hair changes  H: NEGATIVE for bleeding problems  P: NEGATIVE for changes in mood or affect    EXAM:                                                    /58 (BP Location: Left arm, Patient Position: Chair, Cuff Size: Adult Regular)  Pulse 62  Temp 98.4  F (36.9  C) (Oral)  Ht 5' 6.75\" (1.695 m)  Wt 136 lb (61.7 kg)  SpO2 99%  BMI 21.46 kg/m2  Eye:   PERRLA. EOMI. Proptosis right eye  HENT: ear canals and TM's normal and nose and mouth without ulcers or lesions   Neck: no adenopathy. Thyroid normal to palpation. No bruits  Pulm: Lungs clear to auscultation   CV: Regular rates and rhythm  GI: Soft, nontender, Normal active bowel sounds, No hepatosplenomegaly or masses palpable  Ext: Peripheral pulses intact. No edema.  Neuro: Normal strength and tone, sensory exam grossly normal      DIAGNOSTICS:                                                    EKG: Not indicated due to non-vascular surgery and low risk of event (age <65 and without cardiac risk factors besides smoking)  Labs:  Component      Latest Ref Rng & Units 12/1/2015 8/30/2016 6/23/2017   Sodium      133 - 144 mmol/L 140     Potassium      3.4 - 5.3 mmol/L 3.9     Chloride      94 - 109 mmol/L 106     Carbon Dioxide      20 - 32 mmol/L 24     Anion Gap      3 - 14 mmol/L 10     Glucose      70 - 99 mg/dL 88     Urea Nitrogen      7 - 30 mg/dL 11     Creatinine      0.52 - 1.04 mg/dL 0.60 0.68    GFR Estimate      >60 mL/min/1.7m2 >90 . . . >90 . . .    GFR Estimate If Black      >60 mL/min/1.7m2 >90 . . . " >90 . . .    Calcium      8.5 - 10.1 mg/dL 8.9     WBC      4.0 - 11.0 10e9/L   6.4   RBC Count      3.8 - 5.2 10e12/L   4.01   Hemoglobin      11.7 - 15.7 g/dL   12.9   Hematocrit      35.0 - 47.0 %   39.5   MCV      78 - 100 fl   99   MCH      26.5 - 33.0 pg   32.2   MCHC      31.5 - 36.5 g/dL   32.7   RDW      10.0 - 15.0 %   12.7   Platelet Count      150 - 450 10e9/L   226        IMPRESSION:                                                    Reason for surgery/procedure:  Right eye proptosis with hx previous sphenoid wing meningoma  Diagnosis/reason for consult: tobacco use    The proposed surgical procedure is considered INTERMEDIATE risk.    REVISED CARDIAC RISK INDEX  The patient has the following serious cardiovascular risks for perioperative complications such as (MI, PE, VFib and 3  AV Block):  No serious cardiac risks  INTERPRETATION: 0 risks: Class I (very low risk - 0.4% complication rate)    The patient has the following additional risks for perioperative complications:  No identified additional risks         RECOMMENDATIONS:                                                      APPROVAL GIVEN to proceed with proposed procedure, without further diagnostic evaluation     PLAN:   Stop smoking between now and surgery. If not able to quit on your own, consider OTC nicotine patch 7 or 14mg patch daily  Stop Multivitamin 1 week before surgery  No Advil/Ibuprofen or Aleve for 3 days prior to surgery. OK for Tylenol  Nothing to eat/drink after midnight prior to surgery  Pap/pelvic exam appt in the next 1-2 months      Signed Electronically by: Dong Antonio MD    Copy of this evaluation report is provided to requesting physician.    [place] Preop Guidelines

## 2017-07-06 ENCOUNTER — ANESTHESIA EVENT (OUTPATIENT)
Dept: SURGERY | Facility: AMBULATORY SURGERY CENTER | Age: 37
End: 2017-07-06

## 2017-07-07 ENCOUNTER — SURGERY (OUTPATIENT)
Age: 37
End: 2017-07-07

## 2017-07-07 ENCOUNTER — ANESTHESIA (OUTPATIENT)
Dept: SURGERY | Facility: AMBULATORY SURGERY CENTER | Age: 37
End: 2017-07-07

## 2017-07-07 ENCOUNTER — HOSPITAL ENCOUNTER (OUTPATIENT)
Facility: AMBULATORY SURGERY CENTER | Age: 37
End: 2017-07-07
Attending: OTOLARYNGOLOGY

## 2017-07-07 VITALS
HEIGHT: 67 IN | DIASTOLIC BLOOD PRESSURE: 62 MMHG | SYSTOLIC BLOOD PRESSURE: 101 MMHG | BODY MASS INDEX: 21.35 KG/M2 | WEIGHT: 136 LBS | OXYGEN SATURATION: 98 % | HEART RATE: 66 BPM | TEMPERATURE: 98.6 F | RESPIRATION RATE: 15 BRPM

## 2017-07-07 DIAGNOSIS — G89.18 POST-OP PAIN: Primary | ICD-10-CM

## 2017-07-07 DIAGNOSIS — Z98.890 POSTOPERATIVE STATE: ICD-10-CM

## 2017-07-07 LAB
HCG UR QL: NEGATIVE
INTERNAL QC OK POCT: YES

## 2017-07-07 RX ORDER — LIDOCAINE HYDROCHLORIDE 20 MG/ML
INJECTION, SOLUTION INFILTRATION; PERINEURAL PRN
Status: DISCONTINUED | OUTPATIENT
Start: 2017-07-07 | End: 2017-07-07

## 2017-07-07 RX ORDER — FENTANYL CITRATE 50 UG/ML
25-50 INJECTION, SOLUTION INTRAMUSCULAR; INTRAVENOUS
Status: DISCONTINUED | OUTPATIENT
Start: 2017-07-07 | End: 2017-07-08 | Stop reason: HOSPADM

## 2017-07-07 RX ORDER — ECHINACEA PURPUREA EXTRACT 125 MG
1 TABLET ORAL 3 TIMES DAILY
Qty: 1 BOTTLE | Refills: 1 | Status: SHIPPED | OUTPATIENT
Start: 2017-07-07 | End: 2017-12-20

## 2017-07-07 RX ORDER — CEFAZOLIN SODIUM 1 G/3ML
2 INJECTION, POWDER, FOR SOLUTION INTRAMUSCULAR; INTRAVENOUS ONCE
Status: COMPLETED | OUTPATIENT
Start: 2017-07-07 | End: 2017-07-07

## 2017-07-07 RX ORDER — PROPOFOL 10 MG/ML
INJECTION, EMULSION INTRAVENOUS CONTINUOUS PRN
Status: DISCONTINUED | OUTPATIENT
Start: 2017-07-07 | End: 2017-07-07

## 2017-07-07 RX ORDER — SODIUM CHLORIDE, SODIUM LACTATE, POTASSIUM CHLORIDE, CALCIUM CHLORIDE 600; 310; 30; 20 MG/100ML; MG/100ML; MG/100ML; MG/100ML
INJECTION, SOLUTION INTRAVENOUS CONTINUOUS
Status: DISCONTINUED | OUTPATIENT
Start: 2017-07-07 | End: 2017-07-07 | Stop reason: HOSPADM

## 2017-07-07 RX ORDER — NALOXONE HYDROCHLORIDE 0.4 MG/ML
.1-.4 INJECTION, SOLUTION INTRAMUSCULAR; INTRAVENOUS; SUBCUTANEOUS
Status: DISCONTINUED | OUTPATIENT
Start: 2017-07-07 | End: 2017-07-08 | Stop reason: HOSPADM

## 2017-07-07 RX ORDER — FENTANYL CITRATE 50 UG/ML
INJECTION, SOLUTION INTRAMUSCULAR; INTRAVENOUS PRN
Status: DISCONTINUED | OUTPATIENT
Start: 2017-07-07 | End: 2017-07-07

## 2017-07-07 RX ORDER — CEPHALEXIN 500 MG/1
500 CAPSULE ORAL 2 TIMES DAILY
Qty: 20 CAPSULE | Refills: 0 | Status: SHIPPED | OUTPATIENT
Start: 2017-07-07 | End: 2017-12-20

## 2017-07-07 RX ORDER — NEOMYCIN SULFATE, POLYMYXIN B SULFATE AND DEXAMETHASONE 3.5; 10000; 1 MG/ML; [USP'U]/ML; MG/ML
1 SUSPENSION/ DROPS OPHTHALMIC 4 TIMES DAILY
Qty: 1 BOTTLE | Refills: 0 | Status: SHIPPED | OUTPATIENT
Start: 2017-07-07 | End: 2017-12-20

## 2017-07-07 RX ORDER — HYDROCODONE BITARTRATE AND ACETAMINOPHEN 5; 325 MG/1; MG/1
1-2 TABLET ORAL EVERY 4 HOURS PRN
Qty: 30 TABLET | Refills: 0 | Status: SHIPPED | OUTPATIENT
Start: 2017-07-07 | End: 2017-12-20

## 2017-07-07 RX ORDER — LIDOCAINE 40 MG/G
CREAM TOPICAL
Status: DISCONTINUED | OUTPATIENT
Start: 2017-07-07 | End: 2017-07-07 | Stop reason: HOSPADM

## 2017-07-07 RX ORDER — DEXAMETHASONE SODIUM PHOSPHATE 10 MG/ML
INJECTION, SOLUTION INTRAMUSCULAR; INTRAVENOUS PRN
Status: DISCONTINUED | OUTPATIENT
Start: 2017-07-07 | End: 2017-07-07

## 2017-07-07 RX ORDER — PROPOFOL 10 MG/ML
INJECTION, EMULSION INTRAVENOUS PRN
Status: DISCONTINUED | OUTPATIENT
Start: 2017-07-07 | End: 2017-07-07

## 2017-07-07 RX ORDER — HYDROCODONE BITARTRATE AND ACETAMINOPHEN 5; 325 MG/1; MG/1
1-2 TABLET ORAL ONCE
Status: COMPLETED | OUTPATIENT
Start: 2017-07-07 | End: 2017-07-07

## 2017-07-07 RX ORDER — ACETAMINOPHEN 325 MG/1
975 TABLET ORAL ONCE
Status: COMPLETED | OUTPATIENT
Start: 2017-07-07 | End: 2017-07-07

## 2017-07-07 RX ORDER — ERYTHROMYCIN 5 MG/G
OINTMENT OPHTHALMIC PRN
Status: DISCONTINUED | OUTPATIENT
Start: 2017-07-07 | End: 2017-07-07 | Stop reason: HOSPADM

## 2017-07-07 RX ORDER — MEPERIDINE HYDROCHLORIDE 25 MG/ML
12.5 INJECTION INTRAMUSCULAR; INTRAVENOUS; SUBCUTANEOUS
Status: DISCONTINUED | OUTPATIENT
Start: 2017-07-07 | End: 2017-07-08 | Stop reason: HOSPADM

## 2017-07-07 RX ORDER — GLYCOPYRROLATE 0.2 MG/ML
INJECTION, SOLUTION INTRAMUSCULAR; INTRAVENOUS PRN
Status: DISCONTINUED | OUTPATIENT
Start: 2017-07-07 | End: 2017-07-07

## 2017-07-07 RX ORDER — GABAPENTIN 300 MG/1
300 CAPSULE ORAL ONCE
Status: COMPLETED | OUTPATIENT
Start: 2017-07-07 | End: 2017-07-07

## 2017-07-07 RX ORDER — SODIUM CHLORIDE, SODIUM LACTATE, POTASSIUM CHLORIDE, CALCIUM CHLORIDE 600; 310; 30; 20 MG/100ML; MG/100ML; MG/100ML; MG/100ML
INJECTION, SOLUTION INTRAVENOUS CONTINUOUS
Status: DISCONTINUED | OUTPATIENT
Start: 2017-07-07 | End: 2017-07-08 | Stop reason: HOSPADM

## 2017-07-07 RX ORDER — ERYTHROMYCIN 5 MG/G
1 OINTMENT OPHTHALMIC 3 TIMES DAILY
Qty: 3.5 G | Refills: 0 | Status: SHIPPED | OUTPATIENT
Start: 2017-07-07 | End: 2017-07-14

## 2017-07-07 RX ORDER — ONDANSETRON 4 MG/1
4 TABLET, ORALLY DISINTEGRATING ORAL EVERY 30 MIN PRN
Status: DISCONTINUED | OUTPATIENT
Start: 2017-07-07 | End: 2017-07-08 | Stop reason: HOSPADM

## 2017-07-07 RX ORDER — PROMETHAZINE HYDROCHLORIDE 25 MG/ML
12.5 INJECTION, SOLUTION INTRAMUSCULAR; INTRAVENOUS
Status: DISCONTINUED | OUTPATIENT
Start: 2017-07-07 | End: 2017-07-08 | Stop reason: HOSPADM

## 2017-07-07 RX ORDER — ONDANSETRON 2 MG/ML
4 INJECTION INTRAMUSCULAR; INTRAVENOUS EVERY 30 MIN PRN
Status: DISCONTINUED | OUTPATIENT
Start: 2017-07-07 | End: 2017-07-08 | Stop reason: HOSPADM

## 2017-07-07 RX ORDER — METHYLPREDNISOLONE 4 MG
8 TABLET, DOSE PACK ORAL SEE ADMIN INSTRUCTIONS
Qty: 21 TABLET | Refills: 0 | Status: SHIPPED | OUTPATIENT
Start: 2017-07-07 | End: 2017-12-20

## 2017-07-07 RX ORDER — ONDANSETRON 2 MG/ML
INJECTION INTRAMUSCULAR; INTRAVENOUS PRN
Status: DISCONTINUED | OUTPATIENT
Start: 2017-07-07 | End: 2017-07-07

## 2017-07-07 RX ORDER — OXYMETAZOLINE HYDROCHLORIDE 0.05 G/100ML
2-3 SPRAY NASAL 2 TIMES DAILY PRN
Qty: 1 BOTTLE | Refills: 0 | Status: SHIPPED | OUTPATIENT
Start: 2017-07-07 | End: 2017-12-20

## 2017-07-07 RX ORDER — KETOROLAC TROMETHAMINE 30 MG/ML
INJECTION, SOLUTION INTRAMUSCULAR; INTRAVENOUS PRN
Status: DISCONTINUED | OUTPATIENT
Start: 2017-07-07 | End: 2017-07-07

## 2017-07-07 RX ADMIN — ACETAMINOPHEN 975 MG: 325 TABLET ORAL at 06:33

## 2017-07-07 RX ADMIN — GLYCOPYRROLATE 0.1 MG: 0.2 INJECTION, SOLUTION INTRAMUSCULAR; INTRAVENOUS at 08:26

## 2017-07-07 RX ADMIN — SODIUM CHLORIDE, SODIUM LACTATE, POTASSIUM CHLORIDE, CALCIUM CHLORIDE: 600; 310; 30; 20 INJECTION, SOLUTION INTRAVENOUS at 06:49

## 2017-07-07 RX ADMIN — HYDROCODONE BITARTRATE AND ACETAMINOPHEN 1 TABLET: 5; 325 TABLET ORAL at 09:52

## 2017-07-07 RX ADMIN — FENTANYL CITRATE 75 MCG: 50 INJECTION, SOLUTION INTRAMUSCULAR; INTRAVENOUS at 07:22

## 2017-07-07 RX ADMIN — KETOROLAC TROMETHAMINE 30 MG: 30 INJECTION, SOLUTION INTRAMUSCULAR; INTRAVENOUS at 08:30

## 2017-07-07 RX ADMIN — ERYTHROMYCIN 1 INCH: 5 OINTMENT OPHTHALMIC at 09:23

## 2017-07-07 RX ADMIN — LIDOCAINE HYDROCHLORIDE 40 MG: 20 INJECTION, SOLUTION INFILTRATION; PERINEURAL at 07:22

## 2017-07-07 RX ADMIN — FENTANYL CITRATE 50 MCG: 50 INJECTION, SOLUTION INTRAMUSCULAR; INTRAVENOUS at 09:55

## 2017-07-07 RX ADMIN — GLYCOPYRROLATE 0.2 MG: 0.2 INJECTION, SOLUTION INTRAMUSCULAR; INTRAVENOUS at 07:38

## 2017-07-07 RX ADMIN — PROPOFOL 40 MG: 10 INJECTION, EMULSION INTRAVENOUS at 08:25

## 2017-07-07 RX ADMIN — ONDANSETRON 4 MG: 2 INJECTION INTRAMUSCULAR; INTRAVENOUS at 07:45

## 2017-07-07 RX ADMIN — GABAPENTIN 300 MG: 300 CAPSULE ORAL at 06:33

## 2017-07-07 RX ADMIN — PROPOFOL 150 MCG/KG/MIN: 10 INJECTION, EMULSION INTRAVENOUS at 07:26

## 2017-07-07 RX ADMIN — DEXAMETHASONE SODIUM PHOSPHATE 10 MG: 10 INJECTION, SOLUTION INTRAMUSCULAR; INTRAVENOUS at 07:23

## 2017-07-07 RX ADMIN — FENTANYL CITRATE 25 MCG: 50 INJECTION, SOLUTION INTRAMUSCULAR; INTRAVENOUS at 07:30

## 2017-07-07 RX ADMIN — PROPOFOL 160 MG: 10 INJECTION, EMULSION INTRAVENOUS at 07:23

## 2017-07-07 RX ADMIN — CEFAZOLIN SODIUM 2 G: 1 INJECTION, POWDER, FOR SOLUTION INTRAMUSCULAR; INTRAVENOUS at 07:20

## 2017-07-07 NOTE — OP NOTE
Operative Report  July 7, 2017    Pre-op Diagnosis:   1. Right eye proptosis  2. Right sphenoid wing meningioma    Post-op Diagnosis:     1. Right eye proptosis  2. Right sphenoid wing meningioma    Procedure:   Endoscopic endonasal decompression of right medial orbital wall    Surgeons:  MD Pop Tamez MD    Anesthesia:  GETA  EBL:  10 cc  Drains:  None      Complications:  None   Specimens:     ID Type Source Tests Collected by Time Destination   A : Right sinus contents Tissue Sinus Contents, Maxillary, Right SURGICAL PATHOLOGY EXAM Blake Medina MD         Findings:  Right eye proptosis. Normal nasal and sinus anatomy.    Indications:  Ms. Perkins is a 36 year old female with right sphenoid wing meningioma resulting right eye proptosis. She underwent resection and radiation of the meningioma, but still have symptomatic protosis. After a detailed discussion of risk benefit and alternatives, she elected to proceed with the above procedure.    Procedure:  After obtaining signed inform consent, patient was transferred to the operating room. General anesthesia was induced and patient was intubated orotracheally by anesthesia staff. Patient was placed in a supine position and the table was turned 90 degrees. Remicalm image guidance system was placed and registered. The operative site was draped in the usual clean fashion. A time-out was performed to confirm the patients identity and the correctness of the procedure.    Cocaine pledgets were used for decongestion. 2% lidocaine with 1:100,000 epinephrine was injected to the inferior turbinate, middle turbinate root, and sphenopalatine artery area. After adequate time passed for the local anesthesia to take effect, an right uncinectomy was performed to expose the hiatus semilunaris. The right maxillary sinus osteum was identified and enlarged with straight TruCut and back biter. The ethmoid bullae was opened with a 90 degree curette. The lamina  papyracea was identified and confirmed with image guidance. The  lamina papyracea was then out fractured and carefully removed. The periorbita was opened with a 7300 Hughes blade. Manual compression of the right orbit confirmed satisfactory decompression. A gel film was placed at the middle meatus to prevent lateralization of the middle turbinate.    After conclusion of the ENT portion of the procedure, patient was turned to Dr. Desir for orbital floor decompression.    Dr. Medina was present during the entire ENT portion of the procedure.

## 2017-07-07 NOTE — OP NOTE
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      PROCEDURES PERFORMED: Right orbital decompression     SURGEON:  Jorge Luis Desir MD.      ASSISTANTS: Talmage Broadbent, MD and Fide Leslie MD     ANESTHESIA:  General with local infiltration of a 50/50 mixture, 2% lidocaine with epinephrine and 0.5% Marcaine.      COMPLICATIONS:  None.      ESTIMATED BLOOD LOSS:  Less than 5 mL.      HISTORY:  Blas Perkins  presented with severe exophthalmos secondary to sphenoid wing meningioma with exposure keratompathy.  After risks, benefits and alternatives to the proposed procedure were explained, informed consent was obtained.      DESCRIPTION OF PROCEDURE:  Blas Perkins  was brought to the operating room and placed supine on the operating table.  General anesthesia was induced. Dr. Medina performed medial wall decompression and this will be dictated separately.  The Exabre navigation system was set up and registered. The lateral canthus and the inferior fornix were infiltrated with local anesthetic.  The area  was prepped and draped in the typical sterile ophthalmic fashion.  Attention was directed to the right side.  Lateral canthal incision was made with a 15 blade for 8mm.  Dissection was carried down through the orbicularis with monopolar cautery.  Lateral canthotomy and  cantholysis was performed in the  lower eyelid with the monopolar cautery. A transconjunctival incision was then made in the fornix.   Desmarres retractor used to retract the lower eyelid anteriorly and a malleable retractor used to protect the globe.  The dissection was carried down to the orbital rim with a monopolar cautery.  [place] elevator was used to elevate the periosteum from the floor.  The navigation was used throughout the case to identify position and critical structures.  The thin bone  of the orbital floor was removed using the Sonopet.  Using Jake forceps and Kerrison rongeurs, the orbital floor was removed nasal to the infraorbital nerve.  Laterally, we used the Sonopet the thick bone and removed the bone again lateral to the nerve.  The bone removal was taken to the back wall of the maxillary sinus.  Hemostasis was obtained with a monopolar cautery.  The periorbita was opened and the fat allowed to prolapse into the decompressed spaces within the sinuses.  Laterally, the periorbita was opened and the intraconal fat removed with monopolar cautery.  Hemostasis was again obtained. The inferior kalpana of lateral canthal tendon were secured to the lateral orbital rim periosteum with a 5-0 Vicryl sutures.  The skin was closed with running 6-0 plain gut suture. The patient tolerated the ophthalmic portions of the procedures well and was in stable condition.         ADDIE SWIFT MD

## 2017-07-07 NOTE — IP AVS SNAPSHOT
MRN:[id number]                      After Visit Summary   7/7/2017    Blas Perkins    MRN: [id number]           Thank you!     Thank you for choosing [facility] for your care. Our goal is always to provide you with excellent care. Hearing back from our patients is one way we can continue to improve our services. Please take a few minutes to complete the written survey that you may receive in the mail after you visit with us. Thank you!        Patient Information     Date Of Birth          1980        About your hospital stay     You were admitted on:  July 7, 2017 You last received care in the:  [facility]    You were discharged on:  July 7, 2017       Who to Call     For medical emergencies, please call 911.  For non-urgent questions about your medical care, please call your primary care provider or clinic, [phone]  For questions related to your surgery, please call your surgery clinic        Attending Provider     Provider Blake Leach MD Otolaryngology       Primary Care Provider Office Phone # Fax #    Dong Antonio [phone]      After Care Instructions     Diet Instructions       Resume pre procedure diet            Discharge Instructions       Patient to follow up with Dr. Medina in 1-2 weeks            Discharge Instructions - Lifting restrictions       Lifting Restrictions 15 pounds until seen at Post-op follow up appointment            Discharge Medication Instructions       Do NOT take aspirin or medications containing NSAIDS for 72 hours after procedure.            Ice to affected area       Apply cold pack for 15 minutes on, 15 minutes off, for 48 hours while awake.                  Your next 10 appointments already scheduled     Jul 24, 2017  1:30 PM CDT   (Arrive by 1:15 PM)   Post-Op with Jorge Luis Desir MD   [facility] Ophthal[facility] ([facility])    [address] [place] [address]  Floor  [facility] [address]   [phone]            Oct 25, 2017  3:00 PM CDT   RETURN NEURO with Felix Tidwell MD   Eye Clinic ([facility])    [address]  [address] [facility]  9th [address] Clin [address]  [facility] [address]   [phone]              Further instructions from your care team         [facility]  Home Care Following Anesthesia  For 24 hours after surgery:  1. Get plenty of rest.  A responsible adult must stay with you for at least 24 hours after you leave the surgery center.  2. Do not drive or use heavy equipment.  If you have weakness or tingling, don't drive or use heavy equipment until this feeling goes away.   3. Do not drink alcohol.   4. Avoid strenuous or risky activities.  Ask for help when climbing stairs.  5. You may feel lightheaded.  IF so, sit for a few minutes before standing.  Have someone help you get up.   6. If you have nausea (feel sick to your stomach): Drink only clear liquids such as apple juice, ginger ale, broth or 7-Up.  Rest may also help.  Be sure to drink enough fluids.  Move to a regular diet as you feel able.   7. You may have a slight fever.  Call the doctor if your fever is over 100 F (37.7 C) (taken under the tongue) or lasts longer than 24 hours.  8. You may have a dry mouth, a sore throat, muscle aches or trouble sleeping. These should go away after 24 hours.  9. Do not make important or legal decisions.     Tips for taking pain medications  To get the best pain relief possible, remember these points:    Take pain medications as directed, before pain becomes severe.    Pain medication can upset your stomach: taking it with food may help.    Constipation is a common side effect of pain medication. Drink plenty of  fluids.    Eat foods high in fiber. Take a stool softener if recommended by your doctor or pharmacist.    Do not drink alcohol, drive or operate machinery while taking pain  medications.    Ask about other ways to control pain, such as with heat, ice or relaxation.    Call a doctor for any of the followin. Signs of infection (fever, growing tenderness at the surgery site, a large amount of drainage or bleeding, severe pain, foul-smelling drainage, redness, swelling).  2. It has been over 8 to 10 hours since surgery and you are still not able to urinate (pass water).  3. Headache for over 24 hours.      Your doctor is:  Dr. Blake Medina, ENT Otolaryngology: [phone]               Or dial [phone] and ask for the resident on call for:  ENT Otolaryngology  For emergency care, call the:  [place] [facility]:  [phone] (TTY for hearing impaired: [phone])        Post-operative Instructions    Ophthalmic Plastic and Reconstructive Surgery  Jorge Luis Desir M.D.  Talmage Broadbent MD    All instructions apply to the operated eye(s) or eyelid(s).  Wound care and personal care  ? If a patch or bandage has been placed, please leave this in place until seen by your physician. Ensure that the bandage does not get wet when you take a shower.  ? Apply ice compresses 15 minutes on 15 minutes off while awake for 2 days, then switch to warm water compresses 4 times a day until seen by your physician. For warm packs you can place a cup of dry uncooked rice in a clean cotton sock. Then place sock in microwave 30 seconds to one minute. Next place the warm sock into a plastic bag and wrap the bag with clean warm wet washcloth and place over operated eye.    ? You may shower or wash your hair the day after surgery. Do not bathe or go swimming for 1 week to prevent contamination of your wounds.  ? Do not apply make-up to the eyes or eyelids for 2 weeks after surgery.  ? Expect some swelling, bruising, black eye (even into the lower eyelids and cheeks). Also expect serum caking, crusting and discharge from the eye and/or incisions. A small amount of surface bleeding is  normal for the first 48 hours.  ? Your eye(s) and eyelid(s) may be painful and tender. This is normal after surgery.  Use the pain medication as prescribed. If your pain does not improve despite the  medication, contact the office.    Contact information and follow-up  ? Return to the Eye Clinic for a follow-up appointment with your physician as  scheduled. If no appointment has been scheduled, call [phone] for an  appointment with Dr. Desir within 1 to 2 weeks from your date of surgery.  ? For severe pain, bleeding, or loss of vision, call the Eye Clinic at [phone].  After hours or on weekends and holidays, call [phone] and ask to speak with  the ophthalmologist on call.    Activity restrictions and driving  ? Avoid heavy lifting, bending, exercise or strenuous activity for 1 week after surgery.  You may resume other activities and return to work as tolerated.  ? You may not resume driving until have you stopped using narcotic pain medications(such as Norco, Percocet, Tylenol #3).    Medications  ? Restart all your regular home medications and eye drops. If you take Plavix or  Aspirin on a regular basis, wait for 3 days after your surgery before restarting these  in order to decrease the risk of bleeding complications.  ? Avoid aspirin and aspirin-like medications (Motrin, Aleve, Ibuprofen, Yoli-  [place] etc) for 5 days to reduce the risk of bleeding. You may take Tylenol  (acetaminophen) for pain.  ? In addition to your home medications, take the following post-operative medications as prescribed by your physician.    ? Apply antibiotic ointment to all sutures three times a day, and into the operated eye(s) at night.  ? Instill eye drops 3 times a day until finished.  ? Take antibiotic capsules or tablets as directed.  ? Take 1 to 2 pain pills as needed for pain up to every 4 hours.    ? WARNING: All the prescription pain medications listed above contain Tylenol  (acetaminophen). You must not  "take more than 3,000 mg of acetaminophen per  24-hour period. This is equivalent to 6 tablets of Darvocet, 8 tablets of Vicodin, or  12 tablets of Norco, Percocet or Tylenol #3. If you take other over-the-counter medications  containing acetaminophen, you must take the amount of acetaminophen into  account and reduce the number of prescribed pain pills accordingly.  ? The prescribed medications may make you drowsy. You must not drive a car,  operate heavy machinery or drink alcohol while taking them.  ? The prescribed pain medications may cause constipation and nausea. Take them  with some food to prevent a stomach upset. If you continue to experience nausea,  call your physician.       Pending Results     Date and Time Order Name Status Description    7/7/2017 0807 Surgical pathology exam In process             Admission Information     Date & Time Provider Department Dept. Phone    7/7/2017 Blake Medina MD [facility] [phone]      Your Vitals Were     Blood Pressure Pulse Temperature Respirations Height Weight    108/61 66 99.9  F (37.7  C) (Temporal) 12 1.695 m (5' 6.75\") 61.7 kg (136 lb)    Pulse Oximetry BMI (Body Mass Index)                97% 21.46 kg/m2          Daylife Information     Daylife gives you secure access to your electronic health record. If you see a primary care provider, you can also send messages to your care team and make appointments. If you have questions, please call your primary care clinic.  If you do not have a primary care provider, please call [phone] and they will assist you.      [employer]life is an electronic gateway that provides easy, online access to your medical records. With [employer], you can request a clinic appointment, read your test results, renew a prescription or communicate with your care team.     To access your existing account, please contact your [facility] or call [phone] for " assistance.        Care EveryWhere ID     This is your Care EveryWhere ID. This could be used by other organizations to access your [facility] medical records  [id number]        Equal Access to Services     SHIKHA ALLEN : Hadii aad ku hadcharovarun Banks, ashvinjerry recinoschelseyha, lew kalisette gerard, pooja janicein hayaan kemardorian kearney noy palacio. [facility] [phone].    ATENCIÓN: Si habla español, tiene a ortiz disposición servicios gratuitos de asistencia lingüística. Llame al [phone].    We comply with applicable federal civil rights laws and [place] laws. We do not discriminate on the basis of race, color, national origin, age, disability sex, sexual orientation or gender identity.               Review of your medicines      START taking        Dose / Directions    cephALEXin 500 MG capsule   Commonly known as:  KEFLEX   Used for:  Postoperative state        Dose:  500 mg   Take 1 capsule (500 mg) by mouth 2 times daily   Quantity:  20 capsule   Refills:  0       erythromycin ophthalmic ointment   Commonly known as:  ROMYCIN   Used for:  Postoperative state        Dose:  1 Application   Apply 1 Application to eye 3 times daily for 7 days Apply small amount to incision sites, as directed per physician instructions.   Quantity:  3.5 g   Refills:  0       HYDROcodone-acetaminophen 5-325 MG per tablet   Commonly known as:  NORCO   Used for:  Post-op pain        Dose:  1-2 tablet   Take 1-2 tablets by mouth every 4 hours as needed for other (Moderate to Severe Pain)   Quantity:  30 tablet   Refills:  0       methylPREDNISolone 4 MG tablet   Commonly known as:  MEDROL DOSEPAK   Used for:  Postoperative state        Dose:  8 mg   Take 2 tablets (8 mg) by mouth See Admin Instructions follow package directions   Quantity:  21 tablet   Refills:  0       neomycin-polymyxin-dexamethasone 3.5-71994-6.1 Susp ophthalmic susp   Commonly known as:  MAXITROL   Used for:  Postoperative state        Dose:  1 drop   Place 1 drop into the  right eye 4 times daily   Quantity:  1 Bottle   Refills:  0       oxymetazoline 0.05 % spray   Commonly known as:  AFRIN NASAL SPRAY   Used for:  Post-op pain        Dose:  2-3 spray   Spray 2-3 sprays into both nostrils 2 times daily as needed for other (nose bleed)   Quantity:  1 Bottle   Refills:  0       sodium chloride 0.65 % nasal spray   Commonly known as:  OCEAN NASAL SPRAY   Used for:  Post-op pain        Dose:  1 spray   Spray 1 spray into both nostrils 3 times daily   Quantity:  1 Bottle   Refills:  1         CONTINUE these medicines which have NOT CHANGED        Dose / Directions    ARTIFICIAL TEAR OP        Apply to eye At Bedtime   Refills:  0       Multi-vitamin Tabs tablet        Dose:  1 tablet   Take 1 tablet by mouth daily   Refills:  0            Where to get your medicines      These medications were sent to [facility] [address] [place] [address] [address] [place] [address] [address] [place] [address]    Hours:  TRANSPLANT PHONE NUMBER [phone] Phone:  [phone]     cephALEXin 500 MG capsule    erythromycin ophthalmic ointment    methylPREDNISolone 4 MG tablet    neomycin-polymyxin-dexamethasone 3.5-00967-4.1 Susp ophthalmic susp    oxymetazoline 0.05 % spray    sodium chloride 0.65 % nasal spray         Some of these will need a paper prescription and others can be bought over the counter. Ask your nurse if you have questions.     Bring a paper prescription for each of these medications     HYDROcodone-acetaminophen 5-325 MG per tablet                Protect others around you: Learn how to safely use, store and throw away your medicines at www.disposemymeds.org.             Medication List: This is a list of all your medications and when to take them. Check marks below indicate your daily home schedule. Keep this list as a reference.      Medications           Morning Afternoon Evening Bedtime As Needed    ARTIFICIAL TEAR OP   Apply to eye At Bedtime                                 cephALEXin 500 MG capsule   Commonly known as:  KEFLEX   Take 1 capsule (500 mg) by mouth 2 times daily                                erythromycin ophthalmic ointment   Commonly known as:  ROMYCIN   Apply 1 Application to eye 3 times daily for 7 days Apply small amount to incision sites, as directed per physician instructions.   Last time this was given:  1 inch on 7/7/2017  9:23 AM                                HYDROcodone-acetaminophen 5-325 MG per tablet   Commonly known as:  NORCO   Take 1-2 tablets by mouth every 4 hours as needed for other (Moderate to Severe Pain)   Last time this was given:  1 tablet on 7/7/2017  9:52 AM         1 norco given at 09:53                       methylPREDNISolone 4 MG tablet   Commonly known as:  MEDROL DOSEPAK   Take 2 tablets (8 mg) by mouth See Admin Instructions follow package directions                                Multi-vitamin Tabs tablet   Take 1 tablet by mouth daily                                neomycin-polymyxin-dexamethasone 3.5-91287-0.1 Susp ophthalmic susp   Commonly known as:  MAXITROL   Place 1 drop into the right eye 4 times daily                                oxymetazoline 0.05 % spray   Commonly known as:  AFRIN NASAL SPRAY   Spray 2-3 sprays into both nostrils 2 times daily as needed for other (nose bleed)                                sodium chloride 0.65 % nasal spray   Commonly known as:  OCEAN NASAL SPRAY   [place] 1 spray into both nostrils 3 times daily

## 2017-07-07 NOTE — IP AVS SNAPSHOT
[facility]    [address] [place] [address]    [address] [facility] [address]    Phone:  [phone]    Fax:  [phone]                                       After Visit Summary   7/7/2017    Blas Perkins    MRN: [id number]           After Visit Summary Signature Page     I have received my discharge instructions, and my questions have been answered. I have discussed any challenges I see with this plan with the nurse or doctor.    ..........................................................................................................................................  Patient/Patient Representative Signature      ..........................................................................................................................................  Patient Representative Print Name and Relationship to Patient    ..................................................               ................................................  Date                                            Time    ..........................................................................................................................................  Reviewed by Signature/Title    ...................................................              ..............................................  Date                                                            Time

## 2017-07-07 NOTE — ANESTHESIA PREPROCEDURE EVALUATION
Anesthesia Evaluation     .             ROS/MED HX    ENT/Pulmonary: Comment: TMJ. H/o crani, orbital deformity      Neurologic:       Cardiovascular:  - neg cardiovascular ROS       METS/Exercise Tolerance:     Hematologic:         Musculoskeletal:         GI/Hepatic:  - neg GI/hepatic ROS       Renal/Genitourinary:  - ROS Renal section negative       Endo:         Psychiatric:     (+) psychiatric history anxiety      Infectious Disease:         Malignancy:         Other:                     Physical Exam  Normal systems: dental    Airway   Mallampati: II  TM distance: >3 FB    Dental     Cardiovascular   Rhythm and rate: regular      Pulmonary    breath sounds clear to auscultation                    Anesthesia Plan      History & Physical Review  History and physical reviewed and following examination; no interval change.    ASA Status:  2 .    NPO Status:  > 8 hours    Plan for General and ETT with Intravenous induction. Maintenance will be TIVA.    PONV prophylaxis:  Ondansetron (or other 5HT-3) and Dexamethasone or Solumedrol       Postoperative Care  Postoperative pain management:  Oral pain medications and Multi-modal analgesia.      Consents  Anesthetic plan, risks, benefits and alternatives discussed with:  Patient..                          .

## 2017-07-07 NOTE — DISCHARGE INSTRUCTIONS
[facility]  Home Care Following Anesthesia  For 24 hours after surgery:  1. Get plenty of rest.  A responsible adult must stay with you for at least 24 hours after you leave the surgery center.  2. Do not drive or use heavy equipment.  If you have weakness or tingling, don't drive or use heavy equipment until this feeling goes away.   3. Do not drink alcohol.   4. Avoid strenuous or risky activities.  Ask for help when climbing stairs.  5. You may feel lightheaded.  IF so, sit for a few minutes before standing.  Have someone help you get up.   6. If you have nausea (feel sick to your stomach): Drink only clear liquids such as apple juice, ginger ale, broth or 7-Up.  Rest may also help.  Be sure to drink enough fluids.  Move to a regular diet as you feel able.   7. You may have a slight fever.  Call the doctor if your fever is over 100 F (37.7 C) (taken under the tongue) or lasts longer than 24 hours.  8. You may have a dry mouth, a sore throat, muscle aches or trouble sleeping. These should go away after 24 hours.  9. Do not make important or legal decisions.     Tips for taking pain medications  To get the best pain relief possible, remember these points:    Take pain medications as directed, before pain becomes severe.    Pain medication can upset your stomach: taking it with food may help.    Constipation is a common side effect of pain medication. Drink plenty of  fluids.    Eat foods high in fiber. Take a stool softener if recommended by your doctor or pharmacist.    Do not drink alcohol, drive or operate machinery while taking pain medications.    Ask about other ways to control pain, such as with heat, ice or relaxation.    Call a doctor for any of the followin. Signs of infection (fever, growing tenderness at the surgery site, a large amount of drainage or bleeding, severe pain, foul-smelling drainage, redness, swelling).  2. It has been over 8 to 10 hours since  surgery and you are still not able to urinate (pass water).  3. Headache for over 24 hours.      Your doctor is:  Dr. Blake Medina, ENT Otolaryngology: [phone]               Or dial [phone] and ask for the resident on call for:  ENT Otolaryngology  For emergency care, call the:  [facility]:  [phone] (TTY for hearing impaired: [phone])        Post-operative Instructions    Ophthalmic Plastic and Reconstructive Surgery  Jorge Luis Desir M.D.  Talmage Broadbent MD    All instructions apply to the operated eye(s) or eyelid(s).  Wound care and personal care  ? If a patch or bandage has been placed, please leave this in place until seen by your physician. Ensure that the bandage does not get wet when you take a shower.  ? Apply ice compresses 15 minutes on 15 minutes off while awake for 2 days, then switch to warm water compresses 4 times a day until seen by your physician. For warm packs you can place a cup of dry uncooked rice in a clean cotton sock. Then place sock in microwave 30 seconds to one minute. Next place the warm sock into a plastic bag and wrap the bag with clean warm wet washcloth and place over operated eye.    ? You may shower or wash your hair the day after surgery. Do not bathe or go swimming for 1 week to prevent contamination of your wounds.  ? Do not apply make-up to the eyes or eyelids for 2 weeks after surgery.  ? Expect some swelling, bruising, black eye (even into the lower eyelids and cheeks). Also expect serum caking, crusting and discharge from the eye and/or incisions. A small amount of surface bleeding is normal for the first 48 hours.  ? Your eye(s) and eyelid(s) may be painful and tender. This is normal after surgery.  Use the pain medication as prescribed. If your pain does not improve despite the  medication, contact the office.    Contact information and follow-up  ? Return to the Eye Clinic for a follow-up appointment with your physician  as  scheduled. If no appointment has been scheduled, call [phone] for an  appointment with Dr. Desir within 1 to 2 weeks from your date of surgery.  ? For severe pain, bleeding, or loss of vision, call the Eye Clinic at [phone].  After hours or on weekends and holidays, call [phone] and ask to speak with  the ophthalmologist on call.    Activity restrictions and driving  ? Avoid heavy lifting, bending, exercise or strenuous activity for 1 week after surgery.  You may resume other activities and return to work as tolerated.  ? You may not resume driving until have you stopped using narcotic pain medications(such as Norco, Percocet, Tylenol #3).    Medications  ? Restart all your regular home medications and eye drops. If you take Plavix or  Aspirin on a regular basis, wait for 3 days after your surgery before restarting these  in order to decrease the risk of bleeding complications.  ? Avoid aspirin and aspirin-like medications (Motrin, Aleve, Ibuprofen, Yoli-  [place] etc) for 5 days to reduce the risk of bleeding. You may take Tylenol  (acetaminophen) for pain.  ? In addition to your home medications, take the following post-operative medications as prescribed by your physician.    ? Apply antibiotic ointment to all sutures three times a day, and into the operated eye(s) at night.  ? Instill eye drops 3 times a day until finished.  ? Take antibiotic capsules or tablets as directed.  ? Take 1 to 2 pain pills as needed for pain up to every 4 hours.    ? WARNING: All the prescription pain medications listed above contain Tylenol  (acetaminophen). You must not take more than 3,000 mg of acetaminophen per  24-hour period. This is equivalent to 6 tablets of Darvocet, 8 tablets of Vicodin, or  12 tablets of Norco, Percocet or Tylenol #3. If you take other over-the-counter medications  containing acetaminophen, you must take the amount of acetaminophen into  account and reduce the number of prescribed  pain pills accordingly.  ? The prescribed medications may make you drowsy. You must not drive a car,  operate heavy machinery or drink alcohol while taking them.  ? The prescribed pain medications may cause constipation and nausea. Take them  with some food to prevent a stomach upset. If you continue to experience nausea,  call your physician.

## 2017-07-07 NOTE — ANESTHESIA CARE TRANSFER NOTE
Patient: Blas COLEMAN Ganeles    Procedure(s):  Right Orbital Decompression with optical tracking - Wound Class: II-Clean Contaminated  Medial Wall decompression - Wound Class: I-Clean    Diagnosis: Meningioma  Diagnosis Additional Information: No value filed.    Anesthesia Type:   General, ETT     Note:  Airway :Face Mask  Patient transferred to:PACU  Comments: To PACU pt. Alert O2 via face mask @ 8 liters. Report given to RN      Vitals: (Last set prior to Anesthesia Care Transfer)    CRNA VITALS  7/7/2017 0856 - 7/7/2017 0926      7/7/2017             Resp Rate (set): 10                Electronically Signed By: LUIS Mosher CRNA  July 7, 2017  9:26 AM

## 2017-07-07 NOTE — BRIEF OP NOTE
[facility]    Brief Operative Note    Pre-operative diagnosis: Meningioma  Post-operative diagnosis * No post-op diagnosis entered *  Procedure: Procedure(s):  Right Orbital Decompression with optical tracking - Wound Class: II-Clean Contaminated  Medial Wall decompression - Wound Class: I-Clean  Surgeon: Surgeon(s) and Role:  Panel 1:     * Blake Medina MD - Primary    Panel 2:     * Jorge Luis Desir MD - Primary  Anesthesia: General   Estimated blood loss: 10 mL  Drains: None  Specimens:   ID Type Source Tests Collected by Time Destination   A : Right sinus contents Tissue Sinus Contents, Maxillary, Right SURGICAL PATHOLOGY EXAM Blake Medina MD 7/7/2017  8:07 AM      Findings:   Right eye proptosis. Normal nasal and sinus anatomy  Complications: None.  Implants: None.

## 2017-07-07 NOTE — ANESTHESIA POSTPROCEDURE EVALUATION
Patient: Blas Perkins    Procedure(s):  Right Orbital Decompression with optical tracking - Wound Class: II-Clean Contaminated  Medial Wall decompression - Wound Class: I-Clean    Diagnosis:Meningioma  Diagnosis Additional Information: No value filed.    Anesthesia Type:  General, ETT    Note:  Anesthesia Post Evaluation    Patient location during evaluation: PACU  Patient participation: Able to fully participate in evaluation  Level of consciousness: awake and alert  Pain management: adequate  Airway patency: patent  Cardiovascular status: hemodynamically stable  Respiratory status: nonlabored ventilation, spontaneous ventilation and room air  Hydration status: euvolemic  PONV: none     Anesthetic complications: None          Last vitals:  Vitals:    07/07/17 1015 07/07/17 1030 07/07/17 1045   BP: 102/59 101/62 101/62   Pulse:      Resp: 10 12 15   Temp: 37.1  C (98.8  F)  37  C (98.6  F)   SpO2: 97% 99% 98%         Electronically Signed By: Anthony aHmpton MD  July 7, 2017  12:23 PM

## 2017-07-07 NOTE — BRIEF OP NOTE
[facility] Brief Operative Note    Pre-operative diagnosis: Meningioma   Post-operative diagnosis Same   Procedure: Procedure(s):  Right Orbital Decompression with optical tracking - Wound Class: II-Clean Contaminated  Medial Wall decompression - Wound Class: I-Clean   Surgeon: Jorge Luis Desir MD   Assistants(s): Talmage Broadbent MD   Estimated blood loss: Less than 10 mL   Specimens: None   Findings: As expected

## 2017-07-11 LAB — COPATH REPORT: NORMAL

## 2017-07-15 ENCOUNTER — OFFICE VISIT (OUTPATIENT)
Dept: URGENT CARE | Facility: URGENT CARE | Age: 37
End: 2017-07-15
Payer: COMMERCIAL

## 2017-07-15 VITALS
SYSTOLIC BLOOD PRESSURE: 98 MMHG | HEART RATE: 79 BPM | TEMPERATURE: 98.3 F | OXYGEN SATURATION: 96 % | WEIGHT: 135 LBS | DIASTOLIC BLOOD PRESSURE: 60 MMHG | BODY MASS INDEX: 21.3 KG/M2

## 2017-07-15 DIAGNOSIS — Z98.890: ICD-10-CM

## 2017-07-15 DIAGNOSIS — R51.9 SEVERE HEADACHE: Primary | ICD-10-CM

## 2017-07-15 PROCEDURE — 99213 OFFICE O/P EST LOW 20 MIN: CPT | Performed by: FAMILY MEDICINE

## 2017-07-15 NOTE — MR AVS SNAPSHOT
After Visit Summary   7/15/2017    Blas Perkins    MRN: [id number]           Patient Information     Date Of Birth          1980        Visit Information        Provider Department      7/15/2017 3:40 PM Jordon Hernandez DO [facility]        Today's Diagnoses     Severe headache    -  1    History of orbit decompression           Follow-ups after your visit        Your next 10 appointments already scheduled     Jul 24, 2017  1:30 PM CDT   (Arrive by 1:15 PM)   Post-Op with Jorge Luis Desir MD   [facility] Ophthalmology ([facility])    [address]  4th Floor  [facility] [id number] [phone]            Oct 25, 2017  3:00 PM CDT   RETURN NEURO with Felix Tidwell MD   Eye Clinic ([facility])    [facility]  [address]  [address] [address]  [facility] [id number] [phone]              Who to contact     If you have questions or need follow up information about today's clinic visit or your schedule please contact [facility] directly at [phone].  Normal or non-critical lab and imaging results will be communicated to you by MyChart, letter or phone within 4 business days after the clinic has received the results. If you do not hear from us within 7 days, please contact the clinic through MyChart or phone. If you have a critical or abnormal lab result, we will notify you by phone as soon as possible.  Submit refill requests through [employer] or call your pharmacy and they will forward the refill request to us. Please allow 3 business days for your refill to be completed.          Additional Information About Your Visit        MyChart Information     [employer] gives you secure access to your electronic health record. If you see a primary care provider, you can also send messages to your care team and make appointments. If you have questions, please call your  primary care clinic.  If you do not have a primary care provider, please call [phone] and they will assist you.        Care EveryWhere ID     This is your Care EveryWhere ID. This could be used by other organizations to access your [place] medical records  [id number]        Your Vitals Were     Pulse Temperature Pulse Oximetry BMI (Body Mass Index)          79 98.3  F (36.8  C) (Oral) 96% 21.3 kg/m2         Blood Pressure from Last 3 Encounters:   07/15/17 98/60   07/07/17 101/62   06/23/17 110/58    Weight from Last 3 Encounters:   07/15/17 135 lb (61.2 kg)   07/07/17 136 lb (61.7 kg)   06/23/17 136 lb (61.7 kg)              Today, you had the following     No orders found for display       Primary Care Provider Office Phone # Fax #    Dong Antonio [phone]       [facility] [address] [facility] [address]        Equal Access to Services     SHIKHA ALLEN : Hadii aad ku hadasho Soomaali, waaxda luqadaha, qaybta kaalmada adeegyada, waxay carla haydevn sanchez villafuerte . So [facility] [phone].    ATENCIÓN: Si habla español, tiene a ortiz disposición servicios gratuitos de asistencia lingüística. Llame al [phone].    We comply with applicable federal civil rights laws and [place] laws. We do not discriminate on the basis of race, color, national origin, age, disability sex, sexual orientation or gender identity.            Thank you!     Thank you for choosing [facility]  for your care. Our goal is always to provide you with excellent care. Hearing back from our patients is one way we can continue to improve our services. Please take a few minutes to complete the written survey that you may receive in the mail after your visit with us. Thank you!             Your Updated Medication List - Protect others around you: Learn how to safely use, store and throw away your medicines at www.disposemymeds.org.          This list is accurate as of:  7/15/17  4:04 PM.  Always use your most recent med list.                   Brand Name Dispense Instructions for use Diagnosis    ARTIFICIAL TEAR OP      Apply to eye At Bedtime        cephALEXin 500 MG capsule    KEFLEX    20 capsule    Take 1 capsule (500 mg) by mouth 2 times daily    Postoperative state       HYDROcodone-acetaminophen 5-325 MG per tablet    NORCO    30 tablet    Take 1-2 tablets by mouth every 4 hours as needed for other (Moderate to Severe Pain)    Post-op pain       methylPREDNISolone 4 MG tablet    MEDROL DOSEPAK    21 tablet    Take 2 tablets (8 mg) by mouth See Admin Instructions follow package directions    Postoperative state       Multi-vitamin Tabs tablet      Take 1 tablet by mouth daily        neomycin-polymyxin-dexamethasone 3.5-29615-1.1 Susp ophthalmic susp    MAXITROL    1 Bottle    Place 1 drop into the right eye 4 times daily    Postoperative state       oxymetazoline 0.05 % spray    AFRIN NASAL SPRAY    1 Bottle    Spray 2-3 sprays into both nostrils 2 times daily as needed for other (nose bleed)    Post-op pain       sodium chloride 0.65 % nasal spray    OCEAN NASAL SPRAY    1 Bottle    Spray 1 spray into both nostrils 3 times daily    Post-op pain

## 2017-07-15 NOTE — NURSING NOTE
"Chief Complaint   Patient presents with     Eye Problem     right eye, really bad headache, nausea,1x days  surgery 8x days        Initial BP 98/60 (BP Location: Left arm, Patient Position: Chair, Cuff Size: Adult Regular)  Pulse 79  Temp 98.3  F (36.8  C) (Oral)  Wt 135 lb (61.2 kg)  SpO2 96%  BMI 21.3 kg/m2 Estimated body mass index is 21.3 kg/(m^2) as calculated from the following:    Height as of 7/7/17: 5' 6.75\" (1.695 m).    Weight as of this encounter: 135 lb (61.2 kg).  Medication Reconciliation: complete    "

## 2017-07-15 NOTE — PROGRESS NOTES
"SUBJECTIVE: Blas Perkins is a 36 year old female presenting with a chief complaint of Orbital decompression sx 8 days ago and now with 1 day severe ha in pt that doesn't usually get ha's.  Onset of symptoms was 1 day(s) ago.  Course of illness is worsening.    Severity moderately severe  Current and Associated symptoms: none  Treatment measures tried include OTC meds.  Predisposing factors include s/p sx same location as ha.    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     Thrombocytopenia (H) 9/30/2014     Thrombocytopenia (H) 9/30/2014     TMJ (temporomandibular joint syndrome) 1/23/2012     Tobacco abuse      Allergies   Allergen Reactions     Chantix [Varenicline]      Increased \"crabbiness\"     Social History   Substance Use Topics     Smoking status: Current Some Day Smoker     Packs/day: 0.01     Years: 10.00     Types: Cigarettes     Start date: 12/20/1997     Smokeless tobacco: Never Used      Comment: 2-3 cigarettes daily     Alcohol use No      Comment: daily x 5 years-at least a bottle of wine   11/19/13 No alcohol use       ROS:  SKIN: no rash  GI: no vomiting    OBJECTIVE:  BP 98/60 (BP Location: Left arm, Patient Position: Chair, Cuff Size: Adult Regular)  Pulse 79  Temp 98.3  F (36.8  C) (Oral)  Wt 135 lb (61.2 kg)  SpO2 96%  BMI 21.3 kg/p1BHOKKHQ APPEARANCE: healthy, alert and no distress  EYES: EOMI,  PERRL, conjunctiva clear  SKIN: slight bruising under rt eye      ICD-10-CM    1. Severe headache R51    2. History of orbit decompression Z98.890        Fluids/Rest, f/u if worse/not any better    "

## 2017-07-24 ENCOUNTER — OFFICE VISIT (OUTPATIENT)
Dept: OPHTHALMOLOGY | Facility: CLINIC | Age: 37
End: 2017-07-24

## 2017-07-24 DIAGNOSIS — D32.9 MENINGIOMA OF RIGHT SPHENOID WING INVOLVING CAVERNOUS SINUS (H): Primary | ICD-10-CM

## 2017-07-24 DIAGNOSIS — H05.20 PROPTOSIS: ICD-10-CM

## 2017-07-24 DIAGNOSIS — H47.11 PAPILLEDEMA ASSOCIATED WITH INCREASED INTRACRANIAL PRESSURE: ICD-10-CM

## 2017-07-24 ASSESSMENT — VISUAL ACUITY
OS_CC+: +2
OD_CC: 20/20
METHOD: SNELLEN - LINEAR
CORRECTION_TYPE: GLASSES
OS_CC: 20/25

## 2017-07-24 ASSESSMENT — TONOMETRY
IOP_METHOD: ICARE
OS_IOP_MMHG: 14
OD_IOP_MMHG: 16

## 2017-07-24 ASSESSMENT — SLIT LAMP EXAM - LIDS
COMMENTS: NORMAL
COMMENTS: MILD EDEMA AND ECCHYMOSIS

## 2017-07-24 NOTE — MR AVS SNAPSHOT
After Visit Summary   7/24/2017    Blas Perkins    MRN: [id number]           Patient Information     Date Of Birth          1980        Visit Information        Provider Department      7/24/2017 1:30 PM Jorge Luis Desir MD 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      Your next 10 appointments already scheduled     Oct 25, 2017  3:00 PM CDT   RETURN NEURO with Felix Tidwell MD   Eye Clinic ([facility])    [address] [address]  [address]  9[address]  [facility] [id number] [phone]              Who to contact     Please call your clinic at [phone] to:    Ask questions about your health    Make or cancel appointments    Discuss your medicines    Learn about your test results    Speak to your doctor   If you have compliments or concerns about an experience at your clinic, or if you wish to file a complaint, please contact [facility] Patient Relations at [phone] or email us at [email or web address]         Additional Information About Your Visit        MyChart Information     [employer] gives you secure access to your electronic health record. If you see a primary care provider, you can also send messages to your care team and make appointments. If you have questions, please call your primary care clinic.  If you do not have a primary care provider, please call [phone] and they will assist you.      [email or web address] is an electronic gateway that provides easy, online access to your medical records. With [email or web address], you can request a clinic appointment, read your test results, renew a prescription or communicate with  your care team.     To access your existing account, please contact your [facility] or call [phone] for assistance.        Care EveryWhere ID     This is your Care EveryWhere ID. This could be used by other organizations to access your [place] medical records  [id number]         Blood Pressure from Last 3 Encounters:   07/15/17 98/60   07/07/17 101/62   06/23/17 110/58    Weight from Last 3 Encounters:   07/15/17 61.2 kg (135 lb)   07/07/17 61.7 kg (136 lb)   06/23/17 61.7 kg (136 lb)              Today, you had the following     No orders found for display       Primary Care Provider Office Phone # Fax #    Dong Antonio [phone]       [facility] [address] [facility] [address]        Equal Access to Services     BERRY [facility] Center[facility] : Hadii aad ku hadasho Soomaali, waaxda luqadaha, qaybta kaalmada adeegyada, waxay idiin hayaan adedorian villafuerte . So [facility] [phone].    ATENCIÓN: Si habla español, tiene a ortiz disposición servicios gratuitos de asistencia lingüística. Llame al [phone].    We comply with applicable federal civil rights laws and [place] laws. We do not discriminate on the basis of race, color, national origin, age, disability sex, sexual orientation or gender identity.            Thank you!     Thank you for choosing [facility] OPHTHALMOLOGY  for your care. Our goal is always to provide you with excellent care. Hearing back from our patients is one way we can continue to improve our services. Please take a few minutes to complete the written survey that you may receive in the mail after your visit with us. Thank you!             Your Updated Medication List - Protect others around you: Learn how to safely use, store and throw away your medicines at www.disposemymeds.org.          This list is accurate as of: 7/24/17  1:35 PM.  Always use your most recent med list.                   Brand Name Dispense Instructions for use  Diagnosis    ARTIFICIAL TEAR OP      Apply to eye At Bedtime        cephALEXin 500 MG capsule    KEFLEX    20 capsule    Take 1 capsule (500 mg) by mouth 2 times daily    Postoperative state       HYDROcodone-acetaminophen 5-325 MG per tablet    NORCO    30 tablet    Take 1-2 tablets by mouth every 4 hours as needed for other (Moderate to Severe Pain)    Post-op pain       methylPREDNISolone 4 MG tablet    MEDROL DOSEPAK    21 tablet    Take 2 tablets (8 mg) by mouth See Admin Instructions follow package directions    Postoperative state       Multi-vitamin Tabs tablet      Take 1 tablet by mouth daily        neomycin-polymyxin-dexamethasone 3.5-78231-0.1 Susp ophthalmic susp    MAXITROL    1 Bottle    Place 1 drop into the right eye 4 times daily    Postoperative state       oxymetazoline 0.05 % spray    AFRIN NASAL SPRAY    1 Bottle    Spray 2-3 sprays into both nostrils 2 times daily as needed for other (nose bleed)    Post-op pain       sodium chloride 0.65 % nasal spray    OCEAN NASAL SPRAY    1 Bottle    Spray 1 spray into both nostrils 3 times daily    Post-op pain

## 2017-07-24 NOTE — PROGRESS NOTES
Chief Complaints and History of Present Illnesses   Patient presents with     Post Op (Ophthalmology) Right Eye     s/p orbital decompression OD      Here for POW2 follow up right orbital decompression.  Doing very well overall. Some mild double vision in far right gaze, otherwise no issues. Has been improving.         Blas Perkins is a 36 year old female with the following diagnoses:   1. 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 Proptosis    3. Papilledema associated with increased intracranial pressure - Right Eye       POW 2 s/p right orbital decompression.  Healing well. Continue drops until completed, then OK to resume contact lens wear.    Return 2 months, sooner manuel Leslie MD  Ophthalmic Plastic and Reconstructive Surgery Fellow    Attending Physician Attestation:  I have seen and examined this patient.  I have confirmed and edited as necessary the chief complaint(s), history of present illness, review of systems, relevant history, and examination findings as documented by others.  I have personally reviewed the relevant tests, images, and reports as documented above.  I have confirmed and edited as necessary the assessment and plan and agree with this note.    - Jorge Luis Desir MD 1:25 PM 7/24/2017

## 2017-09-18 ENCOUNTER — OFFICE VISIT (OUTPATIENT)
Dept: OPHTHALMOLOGY | Facility: CLINIC | Age: 37
End: 2017-09-18

## 2017-09-18 DIAGNOSIS — D32.9 MENINGIOMA OF RIGHT SPHENOID WING INVOLVING CAVERNOUS SINUS (H): ICD-10-CM

## 2017-09-18 DIAGNOSIS — H05.20 PROPTOSIS: ICD-10-CM

## 2017-09-18 DIAGNOSIS — Z98.890 POSTOPERATIVE EYE STATE: Primary | ICD-10-CM

## 2017-09-18 ASSESSMENT — VISUAL ACUITY
OS_SC: 20/20
OD_SC: 20/20
OD_SC+: -1
METHOD: SNELLEN - LINEAR

## 2017-09-18 ASSESSMENT — TONOMETRY
OS_IOP_MMHG: 16
OD_IOP_MMHG: 16
IOP_METHOD: ICARE

## 2017-09-18 ASSESSMENT — EXTERNAL EXAM - RIGHT EYE: OD_EXAM: PERIORBITAL EDEMA

## 2017-09-18 ASSESSMENT — SLIT LAMP EXAM - LIDS
COMMENTS: NORMAL
COMMENTS: NORMAL

## 2017-09-18 ASSESSMENT — EXTERNAL EXAM - LEFT EYE: OS_EXAM: NORMAL

## 2017-09-18 NOTE — MR AVS SNAPSHOT
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      Your next 10 appointments already scheduled     Oct 25, 2017  3:00 PM CDT   RETURN NEURO with Felix Tidwell MD   Eye Clinic ([facility])    [address]  [address] [facility]  [address]  [facility] [address] [phone]              Who to contact     Please call your clinic at [phone] to:    Ask questions about your health    Make or cancel appointments    Discuss your medicines    Learn about your test results    Speak to your doctor   If you have compliments or concerns about an experience at your clinic, or if you wish to file a complaint, please contact [facility] Patient Relations at [phone] or email us at [email or web address]         Additional Information About Your Visit        MyChart Information     [employer] gives you secure access to your electronic health record. If you see a primary care provider, you can also send messages to your care team and make appointments. If you have questions, please call your primary care clinic.  If you do not have a primary care provider, please call [phone] and they will assist you.      [employer] is an electronic gateway that provides easy, online access to your medical records. With [employer], you can request a clinic appointment, read your test results, renew a prescription or communicate with your care team.     To access  your existing account, please contact your [facility] or call [phone] for assistance.        Care EveryWhere ID     This is your Care EveryWhere ID. This could be used by other organizations to access your [place] medical records  [id number]         Blood Pressure from Last 3 Encounters:   07/15/17 98/60   07/07/17 101/62   06/23/17 110/58    Weight from Last 3 Encounters:   07/15/17 61.2 kg (135 lb)   07/07/17 61.7 kg (136 lb)   06/23/17 61.7 kg (136 lb)              Today, you had the following     No orders found for display       Primary Care Provider Office Phone # Fax #    Dong Antonio [phone]       [address] [place] [address]        Equal Access to Services     SHIKHA ALLEN : Hadii aad ku hadasho Sokatelynn, waaxda luqadaha, qaybta kaalmada moustapha, pooja villafuerte . So [facility] [phone].    ATENCIÓN: Si habla español, tiene a ortiz disposición servicios gratuitos de asistencia lingüística. [facility] [phone].    We comply with applicable federal civil rights laws and [place] laws. We do not discriminate on the basis of race, color, national origin, age, disability sex, sexual orientation or gender identity.            Thank you!     Thank you for choosing [facility] OPHTHALMOLOGY  for your care. Our goal is always to provide you with excellent care. Hearing back from our patients is one way we can continue to improve our services. Please take a few minutes to complete the written survey that you may receive in the mail after your visit with us. Thank you!             Your Updated Medication List - Protect others around you: Learn how to safely use, store and throw away your medicines at www.disposemymeds.org.          This list is accurate as of: 9/18/17 12:10 PM.  Always use your most recent med list.                   Brand Name Dispense Instructions for use Diagnosis    ARTIFICIAL TEAR OP      Apply to eye At  Bedtime        cephALEXin 500 MG capsule    KEFLEX    20 capsule    Take 1 capsule (500 mg) by mouth 2 times daily    Postoperative state       HYDROcodone-acetaminophen 5-325 MG per tablet    NORCO    30 tablet    Take 1-2 tablets by mouth every 4 hours as needed for other (Moderate to Severe Pain)    Post-op pain       methylPREDNISolone 4 MG tablet    MEDROL DOSEPAK    21 tablet    Take 2 tablets (8 mg) by mouth See Admin Instructions follow package directions    Postoperative state       Multi-vitamin Tabs tablet      Take 1 tablet by mouth daily        neomycin-polymyxin-dexamethasone 3.5-95681-6.1 Susp ophthalmic susp    MAXITROL    1 Bottle    Place 1 drop into the right eye 4 times daily    Postoperative state       oxymetazoline 0.05 % spray    AFRIN NASAL SPRAY    1 Bottle    Spray 2-3 sprays into both nostrils 2 times daily as needed for other (nose bleed)    Post-op pain       sodium chloride 0.65 % nasal spray    OCEAN NASAL SPRAY    1 Bottle    Spray 1 spray into both nostrils 3 times daily    Post-op pain

## 2017-09-18 NOTE — NURSING NOTE
Chief Complaints and History of Present Illnesses   Patient presents with     Post Op (Ophthalmology) Right Eye     s/p Right orbital decompression     HPI    Affected eye(s):  Right   Symptoms:     No blurred vision   No double vision      Frequency:  Constant       Do you have eye pain now?:  No      Comments:  2 month postop s/p Right orbital decompression on 7/7/2017. Pt no pain, healed with after surgery. No double vision. No drops.    Isrrael Reilly COT 11:19 AM September 18, 2017

## 2017-09-18 NOTE — PROGRESS NOTES
Chief Complaints and History of Present Illnesses   Patient presents with     Post Op (Ophthalmology) Right Eye     s/p Right orbital decompression       Here for 2 month follow-up of right eye orbital decompression.  No eye pain or other eye complaints.  No questions at this time.       Blas Perkins is a 36 year old female with the following diagnoses:   1. Postoperative eye state - Right Eye    2. 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 Proptosis    Doing well following surgery.  Uncomplicated recovery    PLAN:  Follow-up as needed          Gunnar Silva MD  Ophthalmology Resident, PGY-2    Attending Physician Attestation:  I have seen and examined this patient.  I have confirmed and edited as necessary the chief complaint(s), history of present illness, review of systems, relevant history, and examination findings as documented by others.  I have personally reviewed the relevant tests, images, and reports as documented above.  I have confirmed and edited as necessary the assessment and plan and agree with this note.    - Jorge Luis Desir MD 11:50 AM 9/18/2017

## 2017-11-30 ENCOUNTER — PRENATAL OFFICE VISIT (OUTPATIENT)
Dept: OBGYN | Facility: CLINIC | Age: 37
End: 2017-11-30
Payer: COMMERCIAL

## 2017-11-30 VITALS
SYSTOLIC BLOOD PRESSURE: 108 MMHG | BODY MASS INDEX: 20.84 KG/M2 | WEIGHT: 132.8 LBS | DIASTOLIC BLOOD PRESSURE: 62 MMHG | HEIGHT: 67 IN

## 2017-11-30 DIAGNOSIS — O20.0 THREATENED ABORTION: Primary | ICD-10-CM

## 2017-11-30 DIAGNOSIS — Z98.890 HISTORY OF REVERSAL OF TUBAL LIGATION: ICD-10-CM

## 2017-11-30 DIAGNOSIS — F19.20 CHEMICAL DEPENDENCY (H): ICD-10-CM

## 2017-11-30 LAB — B-HCG SERPL-ACNC: ABNORMAL IU/L (ref 0–5)

## 2017-11-30 PROCEDURE — 36415 COLL VENOUS BLD VENIPUNCTURE: CPT | Performed by: OBSTETRICS & GYNECOLOGY

## 2017-11-30 PROCEDURE — 99213 OFFICE O/P EST LOW 20 MIN: CPT | Performed by: OBSTETRICS & GYNECOLOGY

## 2017-11-30 PROCEDURE — 84702 CHORIONIC GONADOTROPIN TEST: CPT | Performed by: OBSTETRICS & GYNECOLOGY

## 2017-11-30 NOTE — PROGRESS NOTES
SUBJECTIVE:  Blas Perkins is a 37 year old,  female,  P2  who presents for early pregnancy, tubal ligation reversal, meningioma, AMA. Periods are regular q 28-30 days.    Family History   Problem Relation Age of Onset     Family History Negative Mother      Substance Abuse Mother      Family History Negative Father      Family History Negative Sister      Family History Negative Brother      DIABETES Maternal Grandfather      Substance Abuse Maternal Grandfather      Substance Abuse Maternal Grandmother      Unknown/Adopted Paternal Grandmother      Unknown/Adopted Paternal Grandfather      Glaucoma No family hx of      Macular Degeneration No family hx of        Past Medical History:   Diagnosis Date     Alcohol abuse 2013     Anemia 2014     Anxiety 2012     History of colposcopy with cervical biopsy 06,06    MORGAN 1     HSIL (high grade squamous intraepithelial lesion) on Pap smear of cervix 2005     Meningioma (H) 2014    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      OPTICAL TRACKING SYSTEM CRANIOTOMY, EXCISE TUMOR, COMBINED Right 2014    Procedure: COMBINED OPTICAL TRACKING SYSTEM CRANIOTOMY, EXCISE TUMOR;  Surgeon: Austin Mccallum MD;  Location:  OR     OPTICAL TRACKING SYSTEM ENDOSCOPIC ENDONASAL SURGERY Right 2017    Procedure: OPTICAL TRACKING SYSTEM ENDOSCOPIC ENDONASAL SURGERY;  Right Orbital Decompression with optical  "tracking;  Surgeon: Blake Medina MD;  Location:  OR     OPTICAL TRACKING SYSTEM ORBITOTOMY Right 7/7/2017    Procedure: OPTICAL TRACKING SYSTEM ORBITOTOMY;  Medial Wall decompression;  Surgeon: Jorge Luis Desir MD;  Location:  OR       Current Outpatient Prescriptions   Medication     HYDROcodone-acetaminophen (NORCO) 5-325 MG per tablet     sodium chloride (OCEAN NASAL SPRAY) 0.65 % nasal spray     oxymetazoline (AFRIN NASAL SPRAY) 0.05 % spray     methylPREDNISolone (MEDROL DOSEPAK) 4 MG tablet     cephALEXin (KEFLEX) 500 MG capsule     neomycin-polymyxin-dexamethasone (MAXITROL) 3.5-63077-9.1 SUSP ophthalmic susp     multivitamin, therapeutic with minerals (MULTI-VITAMIN) TABS tablet     ARTIFICIAL TEAR OP     No current facility-administered medications for this visit.          Allergies   Allergen Reactions     Chantix [Varenicline]      Increased \"crabbiness\"                                                   Social History   Substance Use Topics     Smoking status: Current Some Day Smoker     Packs/day: 0.01     Years: 10.00     Types: Cigarettes     Start date: 12/20/1997     Smokeless tobacco: Never Used      Comment: 2-3 cigarettes daily     Alcohol use No      Comment: daily x 5 years-at least a bottle of wine   11/19/13 No alcohol use                      Review of Systems    CONSTITUTIONAL:NEGATIVE  EYES: NEGATIVE  ENT/MOUTH: NEGATIVE  RESP: NEGATIVE  CV: NEGATIVE  GI: NEGATIVE  : NEGATIVE  MUSCULOSKELATAL: NEGATIVE  INTEGUMENTARY/SKIN: NEGATIVE  BREAST: NEGATIVE  NEURO: NEGATIVE.      OBJECTIVE:  /62 (BP Location: Right arm, Patient Position: Chair, Cuff Size: Adult Regular)  Ht 5' 6.75\" (1.695 m)  Wt 132 lb 12.8 oz (60.2 kg)  LMP 10/13/2017  BMI 20.96 kg/m2  Pelvis: deferred  General appearance: Healthy. No distress  Skin: Normal.  Mental Status: cooperative, normal affect, no gross thought process defects.  Extremities: Normal             ASSESSMENT:  Tubal ligation " reversal, meningioma, AMA, H/O chemical dependency (ETOH) treatment    PLAN:    1) Quant HCG, discussed risks and symptoms of ectopic pregnancy, call and go immediately to hospital.  2) Discussed  genetic testing 1st trimester screen,  maternal serum screen;  CVS or ammniocentisis , progenity.  3) Denies any issues with EtoH since treatment. Asked to contact her if any stress or concerns. Total time spent was 20 minutes. 20 minutes of face to face time spent counseling and or coordination of care regarding Tubal ligation reversal, meningioma, AMA, H/O chemical dependency (ETOH) treatment .

## 2017-11-30 NOTE — NURSING NOTE
"Chief Complaint   Patient presents with     Consult   Discuss +HCG after TL reversal.  Radha Ramos MA      Initial /62 (BP Location: Right arm, Patient Position: Chair, Cuff Size: Adult Regular)  Ht 5' 6.75\" (1.695 m)  Wt 132 lb 12.8 oz (60.2 kg)  LMP 10/13/2017  BMI 20.96 kg/m2 Estimated body mass index is 20.96 kg/(m^2) as calculated from the following:    Height as of this encounter: 5' 6.75\" (1.695 m).    Weight as of this encounter: 132 lb 12.8 oz (60.2 kg).  Medication Reconciliation: complete    "

## 2017-11-30 NOTE — MR AVS SNAPSHOT
After Visit Summary   2017    Blas Perkins    MRN: [id number]           Patient Information     Date Of Birth          1980        Visit Information        Provider Department      2017 3:00 PM Brody Tuttle MD [facility]        Today's Diagnoses     Threatened     -  1    Chemical dependency (H)        History of reversal of tubal ligation           Follow-ups after your visit        Your next 10 appointments already scheduled     Dec 20, 2017  3:00 PM CST   RETURN NEURO with Felix Tidwell MD   Eye Clinic ([facility])    [address]  [address]  [facility] [address]   [phone]            Dec 21, 2017  2:00 PM CST   Nurse Only with RI PRENATAL NURSE   [facility]    [address]  [facility] [address] [phone]            2018  3:00 PM CST   New Prenatal with Brody Tuttle MD   [facility]    [address] [address] [phone]              Who to contact     If you have questions or need follow up information about today's clinic visit or your schedule please contact [facility] directly at [phone].  Normal or non-critical lab and imaging results will be communicated to you by MyChart, letter or phone within 4 business days after the clinic has received the results. If you do not hear from us within 7 days, please contact the clinic through MyChart or phone. If you have a critical or abnormal lab result, we will notify you by phone as soon as possible.  Submit refill requests through [employer] or call your pharmacy and they will forward the refill request to us. Please allow 3 business days for your refill to be completed.          Additional Information About Your Visit        [employer]  "Information     [employer] gives you secure access to your electronic health record. If you see a primary care provider, you can also send messages to your care team and make appointments. If you have questions, please call your primary care clinic.  If you do not have a primary care provider, please call [phone] and they will assist you.        Care EveryWhere ID     This is your Care EveryWhere ID. This could be used by other organizations to access your [place] medical records  [id number]        Your Vitals Were     Height Last Period BMI (Body Mass Index)             5' 6.75\" (1.695 m) 10/13/2017 20.96 kg/m2          Blood Pressure from Last 3 Encounters:   11/30/17 108/62   07/15/17 98/60   07/07/17 101/62    Weight from Last 3 Encounters:   11/30/17 132 lb 12.8 oz (60.2 kg)   07/15/17 135 lb (61.2 kg)   07/07/17 136 lb (61.7 kg)              We Performed the Following     HCG quantitative pregnancy        Primary Care Provider Office Phone # Fax #    Dong Antonio [phone]       [address] [facility] [address]        Equal Access to Services     SHIKHA ALLEN AH: Hadii martell ku hadasho Soomaali, waaxda luqadaha, qaybta kaalmada adeegyada, pooja marcn sanchez kearney labrennan palacio. So [facility] [phone].    ATENCIÓN: Si habla español, tiene a ortiz disposición servicios gratuitos de asistencia lingüística. Llame al [phone].    We comply with applicable federal civil rights laws and [place] laws. We do not discriminate on the basis of race, color, national origin, age, disability, sex, sexual orientation, or gender identity.            Thank you!     Thank you for choosing [facility]  for your care. Our goal is always to provide you with excellent care. Hearing back from our patients is one way we can continue to improve our services. Please take a few minutes to complete the written survey that you may receive in the mail after your visit with us. Thank you!   "           Your Updated Medication List - Protect others around you: Learn how to safely use, store and throw away your medicines at www.disposemymeds.org.          This list is accurate as of: 11/30/17  5:59 PM.  Always use your most recent med list.                   Brand Name Dispense Instructions for use Diagnosis    ARTIFICIAL TEAR OP      Apply to eye At Bedtime        cephALEXin 500 MG capsule    KEFLEX    20 capsule    Take 1 capsule (500 mg) by mouth 2 times daily    Postoperative state       HYDROcodone-acetaminophen 5-325 MG per tablet    NORCO    30 tablet    Take 1-2 tablets by mouth every 4 hours as needed for other (Moderate to Severe Pain)    Post-op pain       methylPREDNISolone 4 MG tablet    MEDROL DOSEPAK    21 tablet    Take 2 tablets (8 mg) by mouth See Admin Instructions follow package directions    Postoperative state       Multi-vitamin Tabs tablet      Take 1 tablet by mouth daily        neomycin-polymyxin-dexamethasone 3.5-37141-4.1 Susp ophthalmic susp    MAXITROL    1 Bottle    Place 1 drop into the right eye 4 times daily    Postoperative state       oxymetazoline 0.05 % spray    AFRIN NASAL SPRAY    1 Bottle    Spray 2-3 sprays into both nostrils 2 times daily as needed for other (nose bleed)    Post-op pain       sodium chloride 0.65 % nasal spray    OCEAN NASAL SPRAY    1 Bottle    Spray 1 spray into both nostrils 3 times daily    Post-op pain

## 2017-12-01 ENCOUNTER — TELEPHONE (OUTPATIENT)
Dept: OBGYN | Facility: CLINIC | Age: 37
End: 2017-12-01

## 2017-12-01 NOTE — TELEPHONE ENCOUNTER
Reason for Call:  Request for results:    Name of test or procedure: labs    Date of test of procedure: 11/30/17    Location of the test or procedure: [facility]    OK to leave the result message on voice mail or with a family member? YES    Phone number Patient can be reached at:  Home number on file [phone] (home)    Additional comments:     Call taken on 12/1/2017 at 3:52 PM by KASHIF CRAFT

## 2017-12-12 DIAGNOSIS — O09.529 SUPERVISION OF HIGH-RISK PREGNANCY OF ELDERLY MULTIGRAVIDA: Primary | ICD-10-CM

## 2017-12-13 ENCOUNTER — TELEPHONE (OUTPATIENT)
Dept: OBGYN | Facility: CLINIC | Age: 37
End: 2017-12-13

## 2017-12-13 DIAGNOSIS — Z34.00 SUPERVISION OF NORMAL FIRST PREGNANCY, ANTEPARTUM: Primary | ICD-10-CM

## 2017-12-17 DIAGNOSIS — H53.10 SUBJECTIVE VISUAL DISTURBANCE: Primary | ICD-10-CM

## 2017-12-20 ENCOUNTER — OFFICE VISIT (OUTPATIENT)
Dept: OPHTHALMOLOGY | Facility: CLINIC | Age: 37
End: 2017-12-20
Attending: OPHTHALMOLOGY
Payer: COMMERCIAL

## 2017-12-20 DIAGNOSIS — D32.9 MENINGIOMA OF RIGHT SPHENOID WING INVOLVING CAVERNOUS SINUS (H): ICD-10-CM

## 2017-12-20 DIAGNOSIS — H53.10 SUBJECTIVE VISUAL DISTURBANCE: ICD-10-CM

## 2017-12-20 DIAGNOSIS — H47.11 PAPILLEDEMA ASSOCIATED WITH INCREASED INTRACRANIAL PRESSURE: Primary | ICD-10-CM

## 2017-12-20 PROCEDURE — 99212 OFFICE O/P EST SF 10 MIN: CPT | Mod: ZF | Performed by: TECHNICIAN/TECHNOLOGIST

## 2017-12-20 PROCEDURE — 92083 EXTENDED VISUAL FIELD XM: CPT | Mod: ZF | Performed by: OPHTHALMOLOGY

## 2017-12-20 PROCEDURE — 92133 CPTRZD OPH DX IMG PST SGM ON: CPT | Mod: ZF | Performed by: OPHTHALMOLOGY

## 2017-12-20 ASSESSMENT — TONOMETRY
OD_IOP_MMHG: 14
OS_IOP_MMHG: 14
IOP_METHOD: ICARE

## 2017-12-20 ASSESSMENT — CONF VISUAL FIELD
OS_NORMAL: 1
METHOD: COUNTING FINGERS
OD_NORMAL: 1

## 2017-12-20 ASSESSMENT — VISUAL ACUITY
OD_SC: 20/20
METHOD: SNELLEN - LINEAR
OS_SC: 20/20

## 2017-12-20 ASSESSMENT — SLIT LAMP EXAM - LIDS
COMMENTS: NORMAL
COMMENTS: NORMAL

## 2017-12-20 ASSESSMENT — EXTERNAL EXAM - RIGHT EYE: OD_EXAM: PROPTOSIS

## 2017-12-20 ASSESSMENT — EXTERNAL EXAM - LEFT EYE: OS_EXAM: NORMAL

## 2017-12-20 NOTE — NURSING NOTE
Chief Complaints and History of Present Illnesses   Patient presents with     Neurologic Problem     s/p right orbital decompression      HPI    Symptoms:              Comments:  Blas is a 37 year old female presenting with a history of:    1. 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    - S/p radiation with Dr. Quintana  2. S/p right orbital decompression with Dr. Desir 7/2017    Patient states vision is well.   Denies double vision.  Sandee Chao CO 12/20/2017 3:08 PM

## 2017-12-20 NOTE — LETTER
2017    RE: Blas Perkins  : 1980  MRN: [id number]    Dear Providers,    I saw our mutual patient, Blas Perkins, in follow-up in my clinic recently.  After a thorough neuro-ophthalmic history and examination, I came to the following conclusions:      1. 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      - status post radiation therapy in 2016 ( cyberknife by Dr. Quintana)  - status post orbital decompression (2 wall) on 2017 with Anastasia Medina and Thang.    -Normal 20/20 visual acuity and full color vision in the right eye today.  No afferent pupillary defect.    - optic nerve head exam today shows mild elevation but no active edema  - octopus automated 30 degree visual field full in the left eye and essentially full in the right eye.    OCT of the optic nerve head revealed normal retinal nerve fiber layer in the left eye and normalization of thickened retinal nerve fiber layer in the right eye compared to Dec 2016 OCT.    [place] exophthalmometry today demonstrates 2 mm improvement in proptosis of the right eye following orbital decompression    Follow-up with me in 1 year or sooner as needed for any change in vision    Will send letter to Dr. Quintana and Dr. Desir.  For further exam details, please feel free to contact our office for additional records.  If you wish to contact me regarding this patient please email me at [email or web address] or give my clinic a call to arrange a phone conversation.    Sincerely,    Felix Tidwell MD  , Neuro-Ophthalmology and Adult Strabismus  Department of Ophthalmology and Visual Neurosciences  [facility]

## 2017-12-20 NOTE — PROGRESS NOTES
1. 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      - status post radiation therapy in September 2016 ( cyberknife by Dr. Quintana)  - status post orbital decompression (2 wall) on 7/7/2017 with Anastasia Medina and Thang.    -Normal 20/20 visual acuity and full color vision in the right eye today.  No afferent pupillary defect.    - optic nerve head exam today shows mild elevation but no active edema  - octopus automated 30 degree visual field full in the left eye and essentially full in the right eye.    OCT of the optic nerve head revealed normal retinal nerve fiber layer in the left eye and normalization of thickened retinal nerve fiber layer in the right eye compared to Dec 2016 OCT.    [facility] exophthalmometry today demonstrates 2 mm improvement in proptosis of the right eye following orbital decompression    Follow-up with me in 1 year or sooner as needed for any change in vision    Will send letter to Dr. Quintana and Dr. Desir.       Complete documentation of historical and exam elements from today's encounter can be found in the full encounter summary report (not reduplicated in this progress note).  I personally obtained the chief complaint(s) and history of present illness.  I confirmed and edited as necessary the review of systems, past medical/surgical history, family history, social history, and examination findings as documented by others; and I examined the patient myself.  I personally reviewed the relevant tests, images, and reports as documented above.  I formulated and edited as necessary the assessment and plan and discussed the findings and management plan with the patient and family     Felix Tidwell MD

## 2017-12-21 ENCOUNTER — PRENATAL OFFICE VISIT (OUTPATIENT)
Dept: NURSING | Facility: CLINIC | Age: 37
End: 2017-12-21
Payer: COMMERCIAL

## 2017-12-21 DIAGNOSIS — O09.529 SUPERVISION OF HIGH-RISK PREGNANCY OF ELDERLY MULTIGRAVIDA: ICD-10-CM

## 2017-12-21 LAB
ABO + RH BLD: NORMAL
ABO + RH BLD: NORMAL
BLD GP AB SCN SERPL QL: NORMAL
BLOOD BANK CMNT PATIENT-IMP: NORMAL
ERYTHROCYTE [DISTWIDTH] IN BLOOD BY AUTOMATED COUNT: 13.1 % (ref 10–15)
HCT VFR BLD AUTO: 36.1 % (ref 35–47)
HGB BLD-MCNC: 11.9 G/DL (ref 11.7–15.7)
MCH RBC QN AUTO: 32 PG (ref 26.5–33)
MCHC RBC AUTO-ENTMCNC: 33 G/DL (ref 31.5–36.5)
MCV RBC AUTO: 97 FL (ref 78–100)
PLATELET # BLD AUTO: 256 10E9/L (ref 150–450)
RBC # BLD AUTO: 3.72 10E12/L (ref 3.8–5.2)
SPECIMEN EXP DATE BLD: NORMAL
WBC # BLD AUTO: 8.5 10E9/L (ref 4–11)

## 2017-12-21 PROCEDURE — 86900 BLOOD TYPING SEROLOGIC ABO: CPT | Performed by: OBSTETRICS & GYNECOLOGY

## 2017-12-21 PROCEDURE — 36415 COLL VENOUS BLD VENIPUNCTURE: CPT | Performed by: OBSTETRICS & GYNECOLOGY

## 2017-12-21 PROCEDURE — 87340 HEPATITIS B SURFACE AG IA: CPT | Performed by: OBSTETRICS & GYNECOLOGY

## 2017-12-21 PROCEDURE — 85027 COMPLETE CBC AUTOMATED: CPT | Performed by: OBSTETRICS & GYNECOLOGY

## 2017-12-21 PROCEDURE — 99207 ZZC NO CHARGE NURSE ONLY: CPT

## 2017-12-21 PROCEDURE — 86762 RUBELLA ANTIBODY: CPT | Performed by: OBSTETRICS & GYNECOLOGY

## 2017-12-21 PROCEDURE — 86850 RBC ANTIBODY SCREEN: CPT | Performed by: OBSTETRICS & GYNECOLOGY

## 2017-12-21 PROCEDURE — 86901 BLOOD TYPING SEROLOGIC RH(D): CPT | Performed by: OBSTETRICS & GYNECOLOGY

## 2017-12-21 PROCEDURE — 86780 TREPONEMA PALLIDUM: CPT | Performed by: OBSTETRICS & GYNECOLOGY

## 2017-12-21 PROCEDURE — 87389 HIV-1 AG W/HIV-1&-2 AB AG IA: CPT | Performed by: OBSTETRICS & GYNECOLOGY

## 2017-12-21 PROCEDURE — 87086 URINE CULTURE/COLONY COUNT: CPT | Performed by: OBSTETRICS & GYNECOLOGY

## 2017-12-21 NOTE — MR AVS SNAPSHOT
After Visit Summary   12/21/2017    Blas Perkins    MRN: [id number]           Patient Information     Date Of Birth          1980        Visit Information        Provider Department      12/21/2017 2:00 PM RI PRENATAL NURSE [facility]        Today's Diagnoses     Supervision of high-risk pregnancy of elderly multigravida           Follow-ups after your visit        Your next 10 appointments already scheduled     Dec 28, 2017  2:00 PM CST   US OB < 14 WEEKS WITH TRANSVAGINAL SINGLE with OXUS1   [facility]    [address] [place] [address] [phone]           Please bring a list of your medicines (including vitamins, minerals and over-the-counter drugs). Also, tell your doctor about any allergies you may have. Wear comfortable clothes and leave your valuables at home.  If you re less than 20 weeks drink four 8-ounce glasses of fluid an hour before your exam. If you need to empty your bladder before your exam, try to release only a little urine. Then, drink another glass of fluid.  You may have up to two family members in the exam room. If you bring a small child, an adult must be there to care for him or her.  Please call the Imaging Department at your exam site with any questions.            Jan 02, 2018  3:00 PM CST   New Prenatal with Brody Tuttle MD   [facility]    [address] [place] [address] [phone]              Who to contact     If you have questions or need follow up information about today's clinic visit or your schedule please contact [facility] directly at [phone].  Normal or non-critical lab and imaging results will be communicated to you by MyChart, letter or phone within 4 business days after the clinic has received the results. If you do not hear from us within 7  days, please contact the clinic through [email or web address] or phone. If you have a critical or abnormal lab result, we will notify you by phone as soon as possible.  Submit refill requests through [email or web address] or call your pharmacy and they will forward the refill request to us. Please allow 3 business days for your refill to be completed.          Additional Information About Your Visit        LinksifyharTripping Information     [email or web address] gives you secure access to your electronic health record. If you see a primary care provider, you can also send messages to your care team and make appointments. If you have questions, please call your primary care clinic.  If you do not have a primary care provider, please call [phone] and they will assist you.        Care EveryWhere ID     This is your Care EveryWhere ID. This could be used by other organizations to access your [place] medical records  [id number]        Your Vitals Were     Last Period                   10/13/2017 (Approximate)            Blood Pressure from Last 3 Encounters:   11/30/17 108/62   07/15/17 98/60   07/07/17 101/62    Weight from Last 3 Encounters:   11/30/17 132 lb 12.8 oz (60.2 kg)   07/15/17 135 lb (61.2 kg)   07/07/17 136 lb (61.7 kg)              We Performed the Following     ABO/Rh type and screen     Anti Treponema     CBC with platelets     Hepatitis B surface antigen     HIV Antigen Antibody Combo     Rubella Antibody IgG Quantitative     Urine Culture Aerobic Bacterial        Primary Care Provider Office Phone # Fax #    Dong Antonio [phone]       [address] [facility] [address]        Equal Access to Services     [facility] AH: Hadii aad ku hadasho Soomaali, waaxda luqadaha, qaybta kaalmada adeegyada, pooja palacio. So [facility] [phone].    ATENCIÓN: Si habla español, tiene a ortiz disposición servicios gratuitos de asistencia lingüística. Llame al [phone].    We comply with applicable federal civil  rights laws and [place] laws. We do not discriminate on the basis of race, color, national origin, age, disability, sex, sexual orientation, or gender identity.            Thank you!     Thank you for choosing [facility]  for your care. Our goal is always to provide you with excellent care. Hearing back from our patients is one way we can continue to improve our services. Please take a few minutes to complete the written survey that you may receive in the mail after your visit with us. Thank you!             Your Updated Medication List - Protect others around you: Learn how to safely use, store and throw away your medicines at www.disposemymeds.org.          This list is accurate as of: 12/21/17 11:59 PM.  Always use your most recent med list.                   Brand Name Dispense Instructions for use Diagnosis    prenatal multivitamin plus iron 27-0.8 MG Tabs per tablet     100 tablet    Take 1 tablet by mouth daily

## 2017-12-22 LAB
BACTERIA SPEC CULT: NO GROWTH
HBV SURFACE AG SERPL QL IA: NONREACTIVE
HIV 1+2 AB+HIV1 P24 AG SERPL QL IA: NONREACTIVE
RUBV IGG SERPL IA-ACNC: 25 IU/ML
SPECIMEN SOURCE: NORMAL
T PALLIDUM IGG+IGM SER QL: NEGATIVE

## 2017-12-22 RX ORDER — PRENATAL VIT/IRON FUM/FOLIC AC 27MG-0.8MG
1 TABLET ORAL DAILY
Qty: 100 TABLET | Refills: 3 | COMMUNITY
Start: 2017-12-22 | End: 2023-04-03

## 2017-12-28 ENCOUNTER — RADIANT APPOINTMENT (OUTPATIENT)
Dept: ULTRASOUND IMAGING | Facility: CLINIC | Age: 37
End: 2017-12-28
Attending: OBSTETRICS & GYNECOLOGY
Payer: COMMERCIAL

## 2017-12-28 DIAGNOSIS — Z34.00 SUPERVISION OF NORMAL FIRST PREGNANCY, ANTEPARTUM: ICD-10-CM

## 2017-12-28 PROCEDURE — 76801 OB US < 14 WKS SINGLE FETUS: CPT | Performed by: OBSTETRICS & GYNECOLOGY

## 2018-01-02 ENCOUNTER — PRENATAL OFFICE VISIT (OUTPATIENT)
Dept: OBGYN | Facility: CLINIC | Age: 38
End: 2018-01-02
Payer: COMMERCIAL

## 2018-01-02 VITALS — SYSTOLIC BLOOD PRESSURE: 100 MMHG | BODY MASS INDEX: 21.57 KG/M2 | DIASTOLIC BLOOD PRESSURE: 60 MMHG | WEIGHT: 136.7 LBS

## 2018-01-02 DIAGNOSIS — Z12.4 SCREENING FOR CERVICAL CANCER: ICD-10-CM

## 2018-01-02 DIAGNOSIS — Z11.51 SCREENING FOR HPV (HUMAN PAPILLOMAVIRUS): ICD-10-CM

## 2018-01-02 DIAGNOSIS — O09.529 SUPERVISION OF HIGH-RISK PREGNANCY OF ELDERLY MULTIGRAVIDA: Primary | ICD-10-CM

## 2018-01-02 PROCEDURE — 87624 HPV HI-RISK TYP POOLED RSLT: CPT | Performed by: ADVANCED PRACTICE MIDWIFE

## 2018-01-02 PROCEDURE — 99207 ZZC FIRST OB VISIT: CPT | Performed by: ADVANCED PRACTICE MIDWIFE

## 2018-01-02 PROCEDURE — 99000 SPECIMEN HANDLING OFFICE-LAB: CPT | Performed by: ADVANCED PRACTICE MIDWIFE

## 2018-01-02 PROCEDURE — 40000791 ZZHCL STATISTIC VERIFI PRENATAL TRISOMY 21,18,13: Mod: 90 | Performed by: ADVANCED PRACTICE MIDWIFE

## 2018-01-02 PROCEDURE — 36415 COLL VENOUS BLD VENIPUNCTURE: CPT | Performed by: ADVANCED PRACTICE MIDWIFE

## 2018-01-02 PROCEDURE — 87491 CHLMYD TRACH DNA AMP PROBE: CPT | Performed by: ADVANCED PRACTICE MIDWIFE

## 2018-01-02 PROCEDURE — 87591 N.GONORRHOEAE DNA AMP PROB: CPT | Performed by: ADVANCED PRACTICE MIDWIFE

## 2018-01-02 PROCEDURE — G0145 SCR C/V CYTO,THINLAYER,RESCR: HCPCS | Performed by: ADVANCED PRACTICE MIDWIFE

## 2018-01-02 NOTE — NURSING NOTE
"Chief Complaint   Patient presents with     Prenatal Care     First NPN visit with the Midwife       Initial /60 (BP Location: Left arm, Patient Position: Chair, Cuff Size: Adult Regular)  Wt 136 lb 11.2 oz (62 kg)  LMP 10/13/2017 (Approximate)  BMI 21.57 kg/m2 Estimated body mass index is 21.57 kg/(m^2) as calculated from the following:    Height as of 17: 5' 6.75\" (1.695 m).    Weight as of this encounter: 136 lb 11.2 oz (62 kg).  BP completed using cuff size: regular        The following HM Due: pap smear  BONNIE/Claudia (-)      The following patient reported/Care Every where data was sent to:  P ABSTRACT QUALITY INITIATIVES [[id number]]        patient has appointment for today              "

## 2018-01-02 NOTE — MR AVS SNAPSHOT
After Visit Summary   1/2/2018    Blas Perkins    MRN: [id number]           Patient Information     Date Of Birth          1980        Visit Information        Provider Department      1/2/2018 10:45 AM Bev Cadena APRN [facility]        Today's Diagnoses     Supervision of high-risk pregnancy of elderly multigravida    -  1    Screening for HPV (human papillomavirus)        Screening for cervical cancer           Follow-ups after your visit        Additional Services     MAT FETAL MED CTR REFERRAL-PREGNANCY       >> Patient may proceed with recommendations for further testing as directed by the Maternal Fetal Medicine Specialist >>    >> If requesting Fetal Echo: MFM will determine appropriate location for exam due to indication.    >> If requesting Lung Maturity Amnio:  If results indicate fetal lung maturity, induction or C/S is recommended within 36 hours.  Please schedule accordingly.     Dear Patient:   Please be aware that coverage of these services is subject to the terms and limitations of your health insurance plan.  Call member services at your health plan with any benefit or coverage questions.      Please bring the following to your appointment:    >>  Any x-rays, CTs or MRIs which have been performed.  Contact the facility where they were done to arrange for  prior to your scheduled appointment.  Any new CT, MRI or other procedures ordered by your specialist must be performed at a [facility] facility or coordinated by your clinic's referral office.  >>  List of current medications   >>  This referral request   >>  Any documents/labs given to you for this referral                  Follow-up notes from your care team     Return in about 4 weeks (around 1/30/2018) for Prenatal Visit.      Future tests that were ordered for you today     Open Future Orders        Priority Expected Expires Ordered    [facility] Genetic Counseling Routine   1/3/2019 1/2/2018    [facility] Comprehensive Single Routine  11/2/2018 1/2/2018            Who to contact     If you have questions or need follow up information about today's clinic visit or your schedule please contact [facility] directly at [phone].  Normal or non-critical lab and imaging results will be communicated to you by MyChart, letter or phone within 4 business days after the clinic has received the results. If you do not hear from us within 7 days, please contact the clinic through Ahometohart or phone. If you have a critical or abnormal lab result, we will notify you by phone as soon as possible.  Submit refill requests through Mention Mobile or call your pharmacy and they will forward the refill request to us. Please allow 3 business days for your refill to be completed.          Additional Information About Your Visit        MyChart Information     Mention Mobile gives you secure access to your electronic health record. If you see a primary care provider, you can also send messages to your care team and make appointments. If you have questions, please call your primary care clinic.  If you do not have a primary care provider, please call [phone] and they will assist you.        Care EveryWhere ID     This is your Care EveryWhere ID. This could be used by other organizations to access your [place] medical records  [id number]        Your Vitals Were     Last Period BMI (Body Mass Index)                10/13/2017 (Approximate) 21.57 kg/m2           Blood Pressure from Last 3 Encounters:   01/02/18 100/60   11/30/17 108/62   07/15/17 98/60    Weight from Last 3 Encounters:   01/02/18 136 lb 11.2 oz (62 kg)   11/30/17 132 lb 12.8 oz (60.2 kg)   07/15/17 135 lb (61.2 kg)              We Performed the Following     CHLAMYDIA TRACHOMATIS PCR     HPV High Risk Types DNA Cervical     MAT FETAL MED CTR REFERRAL-PREGNANCY     NEISSERIA GONORRHOEA PCR     PAP imaged thin layer screen         Primary Care Provider Office Phone # Fax #    Dong Antonio [phone]       [address] [facility] [address]        Equal Access to Services     SHIKHA ALLEN : Guido ramos shayneo Soanamariaali, waaxda luqadaha, qaybta kaalmada moustapha, pooja kearney laMarlenyflorence palcaio. So [facility] [phone].    ATENCIÓN: Si habla español, tiene a ortiz disposición servicios gratuitos de asistencia lingüística. Llame al [phone].    We comply with applicable federal civil rights laws and [place] laws. We do not discriminate on the basis of race, color, national origin, age, disability, sex, sexual orientation, or gender identity.            Thank you!     Thank you for choosing [facility]  for your care. Our goal is always to provide you with excellent care. Hearing back from our patients is one way we can continue to improve our services. Please take a few minutes to complete the written survey that you may receive in the mail after your visit with us. Thank you!             Your Updated Medication List - Protect others around you: Learn how to safely use, store and throw away your medicines at www.disposemymeds.org.          This list is accurate as of: 1/2/18 11:39 AM.  Always use your most recent med list.                   Brand Name Dispense Instructions for use Diagnosis    prenatal multivitamin plus iron 27-0.8 MG Tabs per tablet     100 tablet    Take 1 tablet by mouth daily

## 2018-01-02 NOTE — PROGRESS NOTES
Blas Perkins is a 37 year old  ,  who is a previous CNM patient. She presents for a new OB Visit. This was a planned pregnancy.     FOB  is in good health.  FOB IS actively involved in relationship and this pregnancy.    Blas was scheduled with Dr. Tuttle, but she is seen by me today as Dr. Tuttle is sick. She has previously seen midwives in other pregnancies and will consider her options and let us know next visit if she desires physician or midwifery care. She is a smoker and is AMA. History of multiple craniotomies and tubal ligation reversal. EDC was changed to reflect U/S as patient was unsure of her LMP. All labs and U/S WNL.   She has not had bleeding since her LMP.    She denies abdominal pain since her LMP.  She has not had nausea.  has not had vomiting.  Any personal or family history of blood clots? No, history of thrombocytopenia after her craniotomy only. Normal plts in prenatal lab work.  History of sickle cell anemia or trait? No         Patient's last menstrual period was 10/13/2017 (approximate)..  Estimated Date of Delivery: Jul 3, 2018 Ultrasound consistent with LMP.    MENSTRUAL HISTORY    frequency: every 28 days  Last PAP:    History of abnormal Pap?  Yes: history of HGSIL with colpo, due for pap today.    Health maintenance updated:  yes        Current medications are:    Current Outpatient Prescriptions:      Prenatal Vit-Fe Fumarate-FA (PRENATAL MULTIVITAMIN PLUS IRON) 27-0.8 MG TABS per tablet, Take 1 tablet by mouth daily, Disp: 100 tablet, Rfl: 3     INFECTION HISTORY  HIV: No  Hepatitis B: No  Hepatitis C: No  Tuberculosis: No    Genital Herpes self: no  Herpes partner:  no  Chlamydia:  no  Gonorrhea:  no  HPV: Yes   BV:  No  Syphilis:  No  Chicken Pox:  No      OB HISTORY  Obstetric History       T2      L2     SAB1   TAB0   Ectopic0   Multiple0   Live Births1       # Outcome Date GA Lbr Narayan/2nd Weight Sex Delivery Anes PTL Lv   4 Current             3 SAB 14 5w0d          2 Term 01 38w0d  9 lb (4.082 kg) M   N       Name: Felix   1 Term 99 42w0d  8 lb (3.629 kg) M   N CHAMP      Name: Dayron          History of GDM: No,  PTL : No,  History of HTN in pregnancy: No,  Thrombocytopenia: Yes - only after craniotomy, resolved, no issue in prior pregnancies,  Shoulder dystocia: No,  Vacuum Extraction: No  PPH: No   3rd of 4th degree laceration: No.   Other complications: No    PERSONAL HISTORY  Exercise Habits:  walking irregularly days per week.  Employment: Full time.  Her job involves light activity with no potential for toxic exposure.    Travel plans:  are none planned.   Diet: eats regular meals and takes daily vitamins  Abuse concerns? No  Hgb A1c screen:  BMI > 30: No, 1st degree family DM: No, History of GDM: No, PCOS: No, High risk ethnicity: No    Social History     Social History     Marital status:      Spouse name: Mitchel     Number of children: 2     Years of education: 14     Occupational History     [employer]     Social History Main Topics     Smoking status: Current Some Day Smoker     Packs/day: 0.01     Years: 10.00     Types: Cigarettes     Start date: 1997     Smokeless tobacco: Never Used      Comment: 2-3 cigarettes daily     Alcohol use No      Comment: daily x 5 years-at least a bottle of wine   11 No alcohol use     Drug use: No     Sexual activity: Yes     Partners: Male     Birth control/ protection: None     Other Topics Concern     Parent/Sibling W/ Cabg, Mi Or Angioplasty Before 65f 55m? No     Social History Narrative         She  reports that she has been smoking Cigarettes.  She started smoking about 20 years ago. She has a 0.10 pack-year smoking history. She has never used smokeless tobacco.  Tobacco Cessation Action Plan: Self help information given to patient  STD testing offered?  Accepted  Last PHQ-9 score on record = No flowsheet data found.  Last GAD7 score on  record =   ANNA-7 SCORE 10/17/2013   Total Score 1     Alcohol Score = none  Referral/Meds needed? 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      OPTICAL TRACKING SYSTEM CRANIOTOMY, EXCISE TUMOR, COMBINED Right 9/24/2014    Procedure: COMBINED OPTICAL TRACKING SYSTEM CRANIOTOMY, EXCISE TUMOR;  Surgeon: Austin Mccallum MD;  Location: [facility] OR     OPTICAL TRACKING SYSTEM ENDOSCOPIC ENDONASAL SURGERY Right 7/7/2017    Procedure: OPTICAL TRACKING SYSTEM ENDOSCOPIC ENDONASAL SURGERY;  Right Orbital Decompression with optical tracking;  Surgeon: Blake Medina MD;  Location: [facility] OR     OPTICAL TRACKING SYSTEM ORBITOTOMY Right 7/7/2017    Procedure: OPTICAL TRACKING SYSTEM ORBITOTOMY;  Medial Wall decompression;  Surgeon: Jorge Luis Desir MD;  Location: [facility] OR       FAMILY HISTORY  Family History   Problem Relation Age of Onset     Family History Negative Mother      Substance Abuse Mother      Family History Negative Father      Family History Negative Sister      Family History Negative Brother      DIABETES Maternal Grandfather      Substance Abuse Maternal Grandfather      Substance Abuse  Maternal Grandmother      Unknown/Adopted Paternal Grandmother      Unknown/Adopted Paternal Grandfather      Glaucoma No family hx of      Macular Degeneration No family hx of          ROS:  12 point review of systems negative other than symptoms noted below.      PHYSICAL EXAM  Vitals: /60 (BP Location: Left arm, Patient Position: Chair, Cuff Size: Adult Regular)  Wt 136 lb 11.2 oz (62 kg)  LMP 10/13/2017 (Approximate)  BMI 21.57 kg/m2  BMI= Body mass index is 21.57 kg/(m^2).     GENERAL:  37 year old pleasant pregnant female, alert, cooperative and well groomed.  NECK:  Thyroid without enlargement and nodules.  Lymph nodes not palpable.   LUNGS:  Clear to auscultation.  BREAST:  Symmetrical without lesions or nodes.  Nipples everted.  Areolas symmetric.  No palpable axillary nodes.  HEART:  RRR without murmur.  ABDOMEN: Soft without masses or tenderness.  Well healed scar from tubal reversal.  GENITALIA: BUS without tenderness or inflammation.  Perineum without lesions.    VAGINA:  Pink, normal rugae and discharge.  CERVIX:  Posterior, smooth, without discharge, and firm/ closed   UTERUS: Anteverted, nontender 14 weeks in size.  ADNEXA: Without masses or tenderness  RECTAL:  Normal appearance.  Digital exam deferred.  LOWER EXTREMITIES: No edema. No significant varicosities.    ASSESSMENT/PLAN:    IUP at 14w0d    ICD-10-CM    1. Supervision of high-risk pregnancy of elderly multigravida O09.529 NEISSERIA GONORRHOEA PCR     CHLAMYDIA TRACHOMATIS PCR     MAT FETAL MED CTR REFERRAL-PREGNANCY   2. Screening for HPV (human papillomavirus) Z11.51 HPV High Risk Types DNA Cervical   3. Screening for cervical cancer Z12.4 PAP imaged thin layer screen        consult for US for AMA patients: Yes, order placed  Genetic Testing reviewed and discussed, patient desires Progenity testing today.     COUNSELING    Instructed on use of triage nurse line and contacting the on call CNM after hours in an emergency.      Symptoms of N&V and fatigue usually start to resolve around 12-16 weeks     Reviewed CNM philosophy, call schedule for labor and delivery, and FSH for delivery    1st OB handout given outlining appointment spacing and CNM information    Reviewed exercise and nutrition    Recommend to gain 25 pounds with her pregnancy.    Discussed OTC medications. OB med list given    Encouraged patient to arrange  if needed    Encouraged patient to take PNV's/DHA    Travel precautions discussed, no air travel after 36 weeks and Zika Virus discussed    Will call patient with lab results when available    Does patient desire a RN home visit from the [facility]?  No    If yes, paperwork completed?  No     F/U to be addressed next visit:  Patient to decide if she would like physician or midwifery care.    Will return to the clinic in 4 weeks for her next routine prenatal check.  Will call to be seen sooner if problems arise.    Reviewed pregnancy at this time. Pap and cultures today. Progenity testing today. Reviewed the differences between physician and midwifery care; patient will discuss with  and decide by next prenatal visit. MFM referral ordered for BRENDA.      Bev RAMOSM

## 2018-01-03 LAB
C TRACH DNA SPEC QL NAA+PROBE: NEGATIVE
N GONORRHOEA DNA SPEC QL NAA+PROBE: NEGATIVE
SPECIMEN SOURCE: NORMAL
SPECIMEN SOURCE: NORMAL

## 2018-01-04 LAB
COPATH REPORT: NORMAL
PAP: NORMAL

## 2018-01-05 LAB
FINAL DIAGNOSIS: NORMAL
HPV HR 12 DNA CVX QL NAA+PROBE: NEGATIVE
HPV16 DNA SPEC QL NAA+PROBE: NEGATIVE
HPV18 DNA SPEC QL NAA+PROBE: NEGATIVE
SPECIMEN DESCRIPTION: NORMAL

## 2018-01-10 ENCOUNTER — TELEPHONE (OUTPATIENT)
Dept: OBGYN | Facility: CLINIC | Age: 38
End: 2018-01-10

## 2018-01-10 NOTE — TELEPHONE ENCOUNTER
Progenity result left on your desk in [place].  It is in a basket with a post-it.    Carmel BRAND R.N.  [facility]

## 2018-01-16 ENCOUNTER — TELEPHONE (OUTPATIENT)
Dept: OBGYN | Facility: CLINIC | Age: 38
End: 2018-01-16

## 2018-01-16 NOTE — TELEPHONE ENCOUNTER
Attempted to call patient to review Progenity results; left message. Progenity normal; male. Bev Cadena CNM

## 2018-01-25 LAB — NON INVASIVE PRENATAL TEST CELL FREE DNA: NORMAL

## 2018-01-30 ENCOUNTER — PRE VISIT (OUTPATIENT)
Dept: MATERNAL FETAL MEDICINE | Facility: CLINIC | Age: 38
End: 2018-01-30

## 2018-01-30 ENCOUNTER — PRENATAL OFFICE VISIT (OUTPATIENT)
Dept: OBGYN | Facility: CLINIC | Age: 38
End: 2018-01-30
Payer: COMMERCIAL

## 2018-01-30 VITALS — SYSTOLIC BLOOD PRESSURE: 102 MMHG | BODY MASS INDEX: 22.2 KG/M2 | WEIGHT: 140.7 LBS | DIASTOLIC BLOOD PRESSURE: 58 MMHG

## 2018-01-30 DIAGNOSIS — O09.529 SUPERVISION OF HIGH-RISK PREGNANCY OF ELDERLY MULTIGRAVIDA: Primary | ICD-10-CM

## 2018-01-30 PROCEDURE — 99207 ZZC PRENATAL VISIT: CPT | Performed by: OBSTETRICS & GYNECOLOGY

## 2018-01-30 NOTE — NURSING NOTE
"Chief Complaint   Patient presents with     Prenatal Care   18w0d      Initial /58 (BP Location: Left arm, Patient Position: Chair, Cuff Size: Adult Regular)  Wt 140 lb 11.2 oz (63.8 kg)  LMP 10/13/2017 (Approximate)  BMI 22.2 kg/m2 Estimated body mass index is 22.2 kg/(m^2) as calculated from the following:    Height as of 11/30/17: 5' 6.75\" (1.695 m).    Weight as of this encounter: 140 lb 11.2 oz (63.8 kg).  Medication Reconciliation: complete    "

## 2018-01-30 NOTE — MR AVS SNAPSHOT
After Visit Summary   1/30/2018    Blas Perkins    MRN: [id number]           Patient Information     Date Of Birth          1980        Visit Information        Provider Department      1/30/2018 2:30 PM Brody Tuttle MD [facility]        Today's Diagnoses     Supervision of high-risk pregnancy of elderly multigravida    -  1       Follow-ups after your visit        Your next 10 appointments already scheduled     Feb 06, 2018  9:30 AM CST   Genetic Counseling with  GEN COUNSELOR 2   [facility] Maternal Fetal Medicine - [facility])    [address]  [facility] [address]   [phone]            Feb 06, 2018 10:15 AM CST   (Arrive by 10:00 AM)   MFM US COMP with [id number]   [facility] Maternal Fetal Medicine Ultrasound - [facility])    [address] [facility] [address]  [facility] [address] [phone]           Wear comfortable clothes and leave your valuables at home.            Feb 06, 2018 10:45 AM CST   Radiology MD with  MFM MD   [facility] Maternal Fetal Medicine [facility]    [address]  [facility] [address] [phone]           Please arrive at the time given for your first appointment. This visit is used internally to schedule the physician's time during your ultrasound.              Who to contact     If you have questions or need follow up information about today's clinic visit or your schedule please contact [facility] directly at [phone].  Normal or non-critical lab and imaging results will be communicated to you by MyChart, letter or phone within 4 business days after the clinic has received the results. If you do not hear from us within 7 days, please contact the clinic through MyChart or phone. If you have a critical or abnormal lab result, we will notify you by phone as soon as possible.  Submit  refill requests through [employer] or call your pharmacy and they will forward the refill request to us. Please allow 3 business days for your refill to be completed.          Additional Information About Your Visit        Femasyshart Information     [employer] gives you secure access to your electronic health record. If you see a primary care provider, you can also send messages to your care team and make appointments. If you have questions, please call your primary care clinic.  If you do not have a primary care provider, please call [phone] and they will assist you.        Care EveryWhere ID     This is your Care EveryWhere ID. This could be used by other organizations to access your [place] medical records  [id number]        Your Vitals Were     Last Period BMI (Body Mass Index)                10/13/2017 (Approximate) 22.2 kg/m2           Blood Pressure from Last 3 Encounters:   01/30/18 102/58   01/02/18 100/60   11/30/17 108/62    Weight from Last 3 Encounters:   01/30/18 140 lb 11.2 oz (63.8 kg)   01/02/18 136 lb 11.2 oz (62 kg)   11/30/17 132 lb 12.8 oz (60.2 kg)              Today, you had the following     No orders found for display       Primary Care Provider Office Phone # Fax #    Dong Antonio [phone]       [address] [facility] [address]        Equal Access to Services     SHIKHA ALLEN AH: Hadii aad ku hadasho Soomaali, waaxda luqadaha, qaybta kaalmada adeegyada, pooja palacio. So [facility] [phone].    ATENCIÓN: Si habla español, tiene a ortiz disposición servicios gratuitos de asistencia lingüística. Llame al [phone].    We comply with applicable federal civil rights laws and [place] laws. We do not discriminate on the basis of race, color, national origin, age, disability, sex, sexual orientation, or gender identity.            Thank you!     Thank you for choosing [facility]  for your care. Our goal is always to provide  you with excellent care. Hearing back from our patients is one way we can continue to improve our services. Please take a few minutes to complete the written survey that you may receive in the mail after your visit with us. Thank you!             Your Updated Medication List - Protect others around you: Learn how to safely use, store and throw away your medicines at www.disposemymeds.org.          This list is accurate as of 1/30/18  2:56 PM.  Always use your most recent med list.                   Brand Name Dispense Instructions for use Diagnosis    prenatal multivitamin plus iron 27-0.8 MG Tabs per tablet     100 tablet    Take 1 tablet by mouth daily

## 2018-02-06 ENCOUNTER — HOSPITAL ENCOUNTER (OUTPATIENT)
Dept: ULTRASOUND IMAGING | Facility: CLINIC | Age: 38
Discharge: HOME | End: 2018-02-06
Attending: ADVANCED PRACTICE MIDWIFE | Admitting: ADVANCED PRACTICE MIDWIFE
Payer: COMMERCIAL

## 2018-02-06 ENCOUNTER — TRANSFERRED RECORDS (OUTPATIENT)
Dept: HEALTH INFORMATION MANAGEMENT | Facility: CLINIC | Age: 38
End: 2018-02-06

## 2018-02-06 ENCOUNTER — OFFICE VISIT (OUTPATIENT)
Dept: MATERNAL FETAL MEDICINE | Facility: CLINIC | Age: 38
End: 2018-02-06
Attending: ADVANCED PRACTICE MIDWIFE
Payer: COMMERCIAL

## 2018-02-06 DIAGNOSIS — O09.522 ELDERLY MULTIGRAVIDA IN SECOND TRIMESTER: Primary | ICD-10-CM

## 2018-02-06 DIAGNOSIS — O09.522 SUPERVISION OF ELDERLY MULTIGRAVIDA IN SECOND TRIMESTER: Primary | ICD-10-CM

## 2018-02-06 DIAGNOSIS — O26.90 PREGNANCY RELATED CONDITION, ANTEPARTUM: ICD-10-CM

## 2018-02-06 PROCEDURE — 76811 OB US DETAILED SNGL FETUS: CPT

## 2018-02-06 PROCEDURE — 96040 ZZH GENETIC COUNSELING, EACH 30 MINUTES: CPT | Mod: ZF | Performed by: GENETIC COUNSELOR, MS

## 2018-02-06 NOTE — PROGRESS NOTES
[facility]  Genetic Counseling Consult    Patient:  Blas Perkins YOB: 1980   Date of Service:  18      Blas Perkins was seen at the [facility] for genetic consultation as part of her appointment for comprehensive ultrasound.  The indication for genetic counseling is advanced maternal age.       Impression/Plan:   1. Blas had a cell-free fetal DNA test earlier in pregnancy, which was normal.    2. Blas had a comprehensive (level II) ultrasound today, which was normal.  Please see the ultrasound report for further details.    3. The patient declines genetic amniocentesis and additional maternal serum screening today.    Pregnancy History:   /Parity:    Age at Delivery: 37 year old  CHILANGO: 7/3/2018, by Ultrasound  Gestational Age: 19w0d    No significant complications or exposures were reported in the current pregnancy.    Blas murray pregnancy history is significant for 2 prior full term pregnancies with no reported complications and 1 first trimester miscarriage with no known cause.    Medical History:   Blas murray reported medical history is not expected to impact pregnancy management or risks to fetal development.       Family History:   A three-generation pedigree was obtained, and is scanned under the  Media  tab.   The following significant findings were reported by Blas:    2 sons from a previous relationship, ages 18 and 16, both healthy    1 brother, 36, healthy with 1 healthy daughter    Maternal half sister, 46, healthy, with 3 healthy sons    Parents alive and well    FOB: 38, healthy with no children from any other relationships    FOB: 2 brothers, both healthy, both with no children by choice    FOB: Parents alive and well    The reported family history is negative for multiple miscarriages, stillbirths, birth defects, cognitive impairment, known genetic conditions, and  consanguinity.       Carrier Screening:   The patient reports that she and the father of the pregnancy have  ancestry:      Cystic fibrosis is an autosomal recessive genetic condition that occurs with increased frequency in individuals of  ancestry and carrier screening for this condition is available.  In addition,  screening in the [facility] includes cystic fibrosis.    The patient reports that the father of the pregnancy has Alevism ancestry, unknown if Ashkenazi Alevism:      There is a group of several autosomal recessive genetic conditions that occur with increased frequency in individuals of Ashkenazi Alevism ancestry, such as Ron Sachs disease and Canavan disease.  Carrier screening is available for a variety of these conditions.      Expanded carrier screening for mutations in a large panel of genes associated with autosomal recessive conditions including cystic fibrosis, spinal muscular atrophy, and others, is now available.      The patient has declined the carrier screening options reviewed today.       Risk Assessment for Chromosome Conditions:   We explained that the risk for fetal chromosome abnormalities increases with maternal age. We discussed specific features of common chromosome abnormalities, including Down syndrome, trisomy 13, trisomy 18, and sex chromosome trisomies.      - At age 37 at midtrimester, the risk to have a baby with Down syndrome is 1 in 98.     - At age 37 at midtrimester, the risk to have a baby with any chromosome abnormality is 1 in 51.     - At age 37 at delivery, the risk to have a baby with Down syndrome is 1 in 137.    - At age 37 at delivery, the risk to have a baby with any chromosome abnormality is 1 in 82.       Blas had maternal serum screening earlier in pregnancy.    Non-invasive Prenatal Testing (NIPT)    Maternal plasma cell-free DNA testing    Screens for fetal trisomy 21, trisomy 13, trisomy 18, and sex chromosome  aneuploidy    First trimester ultrasound with nuchal translucency and nasal bone assessment was not performed in this pregnancy, to our knowledge.    Blas had a Innatal NIPT test earlier in pregnancy; we reviewed the results today, which are normal for chromosome 13, chromosome 18 and chromosome 21 (no aneuploidy detected)    Given the accuracy of this test, these results greatly decrease the chance for certain fetal chromosome abnormalities    We discussed the limitations of normal NIPT results    MSAFP (after 15 weeks for open neural tube defect screening) results were not available for our review today.       Testing Options:   We discussed the following options:   Non-invasive Prenatal Testing (NIPT)    Maternal plasma cell-free DNA testing; first trimester ultrasound with nuchal translucency and nasal bone assessment is recommended, when appropriate    Screens for fetal trisomy 21, trisomy 13, trisomy 18, and sex chromosome aneuploidy    Cannot screen for open neural tube defects; maternal serum AFP after 15 weeks is recommended       Genetic Amniocentesis    Invasive procedure typically performed in the second trimester by which amniotic fluid is obtained for the purpose of chromosome analysis and/or other prenatal genetic analysis    Diagnostic results; >99% sensitivity for fetal chromosome abnormalities    AFAFP measurement tests for open neural tube defects       Comprehensive (Level II) ultrasound:     Detailed ultrasound performed between 18-22 weeks gestation    Screen for major birth defects and markers for aneuploidy    We reviewed the benefits and limitations of this testing.  Screening tests provide a risk assessment specific to the pregnancy for certain fetal chromosome abnormalities, but cannot definitively diagnose or exclude a fetal chromosome abnormality.  Follow-up genetic counseling and consideration of diagnostic testing is recommended with any abnormal screening result. Diagnostic tests  carry inherent risks- including risk of miscarriage- that require careful consideration.  These tests can detect fetal chromosome abnormalities with greater than 99% certainty.  Results can be compromised by maternal cell contamination or mosaicism, and are limited by the resolution of cytogenetic G-banding technology.  There is no screening nor diagnostic test that can detect all forms of birth defects or mental disability.    It was a pleasure to be involved with Blas s care. Face-to-face time of the meeting was 20 minutes.      Crow Rsoe MS, [facility]  Licensed Genetic Counselor  Phone: [phone]  Pager: [phone]

## 2018-02-06 NOTE — MR AVS SNAPSHOT
After Visit Summary   2/6/2018    Blas Perkins    MRN: [id number]           Patient Information     Date Of Birth          1980        Visit Information        Provider Department      2/6/2018 9:30 AM [facility]        Today's Diagnoses     Supervision of elderly multigravida in second trimester    -  1    Pregnancy related condition, antepartum           Follow-ups after your visit        Who to contact     If you have questions or need follow up information about today's clinic visit or your schedule please contact [place] [facility] directly at [phone].  Normal or non-critical lab and imaging results will be communicated to you by MyFabhart, letter or phone within 4 business days after the clinic has received the results. If you do not hear from us within 7 days, please contact the clinic through Razorsightt or phone. If you have a critical or abnormal lab result, we will notify you by phone as soon as possible.  Submit refill requests through [employer] or call your pharmacy and they will forward the refill request to us. Please allow 3 business days for your refill to be completed.          Additional Information About Your Visit        MyChart Information     [employer] gives you secure access to your electronic health record. If you see a primary care provider, you can also send messages to your care team and make appointments. If you have questions, please call your primary care clinic.  If you do not have a primary care provider, please call [phone] and they will assist you.        Care EveryWhere ID     This is your Care EveryWhere ID. This could be used by other organizations to access your [place] medical records  [id number]        Your Vitals Were     Last Period                   10/13/2017 (Approximate)            Blood Pressure from Last 3 Encounters:   01/30/18 102/58   01/02/18 100/60   11/30/17  108/62    Weight from Last 3 Encounters:   01/30/18 63.8 kg (140 lb 11.2 oz)   01/02/18 62 kg (136 lb 11.2 oz)   11/30/17 60.2 kg (132 lb 12.8 oz)              We Performed the Following     [facility] Genetic Counseling        Primary Care Provider Office Phone # Fax #    Dong Antonio [phone]       [address] [facility] [address]        Equal Access to Services     SHIKHA ALLEN : Hadii aad ku hadasho Soomaali, waaxda luqadaha, qaybta kaalmada adeegyada, waxay idiin hayaan adedorian palacio. So [facility] [phone].    ATENCIÓN: Si habla español, tiene a ortiz disposición servicios gratuitos de asistencia lingüística. [facility] [phone].    We comply with applicable federal civil rights laws and [place] laws. We do not discriminate on the basis of race, color, national origin, age, disability, sex, sexual orientation, or gender identity.            Thank you!     Thank you for choosing [facility]  for your care. Our goal is always to provide you with excellent care. Hearing back from our patients is one way we can continue to improve our services. Please take a few minutes to complete the written survey that you may receive in the mail after your visit with us. Thank you!             Your Updated Medication List - Protect others around you: Learn how to safely use, store and throw away your medicines at www.disposemymeds.org.          This list is accurate as of 2/6/18 11:18 AM.  Always use your most recent med list.                   Brand Name Dispense Instructions for use Diagnosis    prenatal multivitamin plus iron 27-0.8 MG Tabs per tablet     100 tablet    Take 1 tablet by mouth daily

## 2018-02-06 NOTE — MR AVS SNAPSHOT
After Visit Summary   2/6/2018    Blas Perkins    MRN: [id number]           Patient Information     Date Of Birth          1980        Visit Information        Provider Department      2/6/2018 10:45 AM Kelin Huizar MD [facility] Maternal Fetal Medicine [facility]        Today's Diagnoses     Elderly multigravida in second trimester    -  1       Follow-ups after your visit        Who to contact     If you have questions or need follow up information about today's clinic visit or your schedule please contact [facility] directly at [phone].  Normal or non-critical lab and imaging results will be communicated to you by [employer], letter or phone within 4 business days after the clinic has received the results. If you do not hear from us within 7 days, please contact the clinic through Pne[employer]Caret or phone. If you have a critical or abnormal lab result, we will notify you by phone as soon as possible.  Submit refill requests through [employer] or call your pharmacy and they will forward the refill request to us. Please allow 3 business days for your refill to be completed.          Additional Information About Your Visit        MyChart Information     [employer] gives you secure access to your electronic health record. If you see a primary care provider, you can also send messages to your care team and make appointments. If you have questions, please call your primary care clinic.  If you do not have a primary care provider, please call [phone] and they will assist you.        Care EveryWhere ID     This is your Care EveryWhere ID. This could be used by other organizations to access your [place] medical records  [id number]        Your Vitals Were     Last Period                   10/13/2017 (Approximate)            Blood Pressure from Last 3 Encounters:   01/30/18 102/58   01/02/18 100/60   11/30/17 108/62    Weight from Last 3 Encounters:   01/30/18 63.8 kg  (140 lb 11.2 oz)   01/02/18 62 kg (136 lb 11.2 oz)   11/30/17 60.2 kg (132 lb 12.8 oz)              Today, you had the following     No orders found for display       Primary Care Provider Office Phone # Fax #    Dong Antonio [phone]       [address] [facility] [address]        Equal Access to Services     SHIKHA ALLEN : Hadii aad ku hadasho Soomaali, waaxda luqadaha, qaybta kaalmada adeegyada, waxay idiin hayaan adeeg blainedoretha labrennan . So [facility] [phone].    ATENCIÓN: Si habla esphenrique, tiene a ortiz disposición servicios gratuitos de asistencia lingüística. Llame al [phone].    We comply with applicable federal civil rights laws and [place] laws. We do not discriminate on the basis of race, color, national origin, age, disability, sex, sexual orientation, or gender identity.            Thank you!     Thank you for choosing [facility]  for your care. Our goal is always to provide you with excellent care. Hearing back from our patients is one way we can continue to improve our services. Please take a few minutes to complete the written survey that you may receive in the mail after your visit with us. Thank you!             Your Updated Medication List - Protect others around you: Learn how to safely use, store and throw away your medicines at www.disposemymeds.org.          This list is accurate as of 2/6/18 11:06 AM.  Always use your most recent med list.                   Brand Name Dispense Instructions for use Diagnosis    prenatal multivitamin plus iron 27-0.8 MG Tabs per tablet     100 tablet    Take 1 tablet by mouth daily

## 2018-02-06 NOTE — PROGRESS NOTES
Please see full imaging report from ViewPoint program under imaging tab.    Normal anatomic survey. Low risk NIPT.    Kelin Huizar MD  Maternal Fetal Medicine

## 2018-02-27 ENCOUNTER — PRENATAL OFFICE VISIT (OUTPATIENT)
Dept: OBGYN | Facility: CLINIC | Age: 38
End: 2018-02-27
Payer: COMMERCIAL

## 2018-02-27 VITALS — SYSTOLIC BLOOD PRESSURE: 102 MMHG | DIASTOLIC BLOOD PRESSURE: 62 MMHG | BODY MASS INDEX: 22.61 KG/M2 | WEIGHT: 143.3 LBS

## 2018-02-27 DIAGNOSIS — O09.529 SUPERVISION OF HIGH-RISK PREGNANCY OF ELDERLY MULTIGRAVIDA: Primary | ICD-10-CM

## 2018-02-27 PROCEDURE — 99207 ZZC PRENATAL VISIT: CPT | Performed by: OBSTETRICS & GYNECOLOGY

## 2018-02-27 NOTE — NURSING NOTE
"Chief Complaint   Patient presents with     Prenatal Care   22w0d  No concerns.  Radha Ramos MA      Initial /62 (BP Location: Right arm, Patient Position: Chair, Cuff Size: Adult Regular)  Wt 143 lb 4.8 oz (65 kg)  LMP 10/13/2017 (Approximate)  BMI 22.61 kg/m2 Estimated body mass index is 22.61 kg/(m^2) as calculated from the following:    Height as of 11/30/17: 5' 6.75\" (1.695 m).    Weight as of this encounter: 143 lb 4.8 oz (65 kg).  Medication Reconciliation: complete    "

## 2018-02-27 NOTE — MR AVS SNAPSHOT
After Visit Summary   2/27/2018    Blas Perkins    MRN: [id number]           Patient Information     Date Of Birth          1980        Visit Information        Provider Department      2/27/2018 2:15 PM Brody Tuttle MD [facility]        Today's Diagnoses     Supervision of high-risk pregnancy of elderly multigravida    -  1       Follow-ups after your visit        Who to contact     If you have questions or need follow up information about today's clinic visit or your schedule please contact [facility] directly at [phone].  Normal or non-critical lab and imaging results will be communicated to you by MyChart, letter or phone within 4 business days after the clinic has received the results. If you do not hear from us within 7 days, please contact the clinic through [employer] or phone. If you have a critical or abnormal lab result, we will notify you by phone as soon as possible.  Submit refill requests through [employer] or call your pharmacy and they will forward the refill request to us. Please allow 3 business days for your refill to be completed.          Additional Information About Your Visit        MyChart Information     [employer] gives you secure access to your electronic health record. If you see a primary care provider, you can also send messages to your care team and make appointments. If you have questions, please call your primary care clinic.  If you do not have a primary care provider, please call [phone] and they will assist you.        Care EveryWhere ID     This is your Care EveryWhere ID. This could be used by other organizations to access your [place] medical records  [id number]        Your Vitals Were     Last Period BMI (Body Mass Index)                10/13/2017 (Approximate) 22.61 kg/m2           Blood Pressure from Last 3 Encounters:   02/27/18 102/62   01/30/18 102/58   01/02/18 100/60    Weight from  Last 3 Encounters:   02/27/18 143 lb 4.8 oz (65 kg)   01/30/18 140 lb 11.2 oz (63.8 kg)   01/02/18 136 lb 11.2 oz (62 kg)              Today, you had the following     No orders found for display       Primary Care Provider Office Phone # Fax #    Dong Antoino [phone]       [address] [facility] [address]        Equal Access to Services     SHIKHA ALLEN : Hadii aad ku hadasho Soomaali, waaxda luqadaha, qaybta kaalmada adeegyada, waxay idiin hayaan adeeg kharash la'[place] . So [facility] [phone].    ATENCIÓN: Si habla español, tiene a ortiz disposición servicios gratuitos de asistencia lingüística. [facility] [phone].    We comply with applicable federal civil rights laws and [place] laws. We do not discriminate on the basis of race, color, national origin, age, disability, sex, sexual orientation, or gender identity.            Thank you!     Thank you for choosing [facility]  for your care. Our goal is always to provide you with excellent care. Hearing back from our patients is one way we can continue to improve our services. Please take a few minutes to complete the written survey that you may receive in the mail after your visit with us. Thank you!             Your Updated Medication List - Protect others around you: Learn how to safely use, store and throw away your medicines at www.disposemymeds.org.          This list is accurate as of 2/27/18  2:37 PM.  Always use your most recent med list.                   Brand Name Dispense Instructions for use Diagnosis    prenatal multivitamin plus iron 27-0.8 MG Tabs per tablet     100 tablet    Take 1 tablet by mouth daily

## 2018-03-27 ENCOUNTER — PRENATAL OFFICE VISIT (OUTPATIENT)
Dept: OBGYN | Facility: CLINIC | Age: 38
End: 2018-03-27
Payer: COMMERCIAL

## 2018-03-27 VITALS — SYSTOLIC BLOOD PRESSURE: 92 MMHG | WEIGHT: 148.7 LBS | DIASTOLIC BLOOD PRESSURE: 60 MMHG | BODY MASS INDEX: 23.46 KG/M2

## 2018-03-27 DIAGNOSIS — O09.529 SUPERVISION OF HIGH-RISK PREGNANCY OF ELDERLY MULTIGRAVIDA: Primary | ICD-10-CM

## 2018-03-27 LAB
ERYTHROCYTE [DISTWIDTH] IN BLOOD BY AUTOMATED COUNT: 12.7 % (ref 10–15)
GLUCOSE 1H P 50 G GLC PO SERPL-MCNC: 74 MG/DL (ref 60–129)
HCT VFR BLD AUTO: 33.8 % (ref 35–47)
HGB BLD-MCNC: 11 G/DL (ref 11.7–15.7)
MCH RBC QN AUTO: 32.3 PG (ref 26.5–33)
MCHC RBC AUTO-ENTMCNC: 32.5 G/DL (ref 31.5–36.5)
MCV RBC AUTO: 99 FL (ref 78–100)
PLATELET # BLD AUTO: 249 10E9/L (ref 150–450)
RBC # BLD AUTO: 3.41 10E12/L (ref 3.8–5.2)
WBC # BLD AUTO: 9.7 10E9/L (ref 4–11)

## 2018-03-27 PROCEDURE — 85027 COMPLETE CBC AUTOMATED: CPT | Performed by: OBSTETRICS & GYNECOLOGY

## 2018-03-27 PROCEDURE — 82950 GLUCOSE TEST: CPT | Performed by: OBSTETRICS & GYNECOLOGY

## 2018-03-27 PROCEDURE — 86780 TREPONEMA PALLIDUM: CPT | Performed by: OBSTETRICS & GYNECOLOGY

## 2018-03-27 PROCEDURE — 36415 COLL VENOUS BLD VENIPUNCTURE: CPT | Performed by: OBSTETRICS & GYNECOLOGY

## 2018-03-27 PROCEDURE — 99207 ZZC PRENATAL VISIT: CPT | Performed by: OBSTETRICS & GYNECOLOGY

## 2018-03-27 NOTE — NURSING NOTE
"Chief Complaint   Patient presents with     Prenatal Care     26w0d  Pt having 1 hour glucose, pt is A+.  Radha Ramos MA    Initial BP 92/60 (BP Location: Left arm, Patient Position: Chair, Cuff Size: Adult Regular)  Wt 148 lb 11.2 oz (67.4 kg)  LMP 10/13/2017 (Approximate)  BMI 23.46 kg/m2 Estimated body mass index is 23.46 kg/(m^2) as calculated from the following:    Height as of 11/30/17: 5' 6.75\" (1.695 m).    Weight as of this encounter: 148 lb 11.2 oz (67.4 kg).  Medication Reconciliation: complete    "

## 2018-03-27 NOTE — MR AVS SNAPSHOT
After Visit Summary   3/27/2018    Blas Perkins    MRN: [id number]           Patient Information     Date Of Birth          1980        Visit Information        Provider Department      3/27/2018 1:45 PM Brody Tuttle MD [facility]        Today's Diagnoses     Supervision of high-risk pregnancy of elderly multigravida    -  1       Follow-ups after your visit        Who to contact     If you have questions or need follow up information about today's clinic visit or your schedule please contact [facility] directly at [phone].  Normal or non-critical lab and imaging results will be communicated to you by MyChart, letter or phone within 4 business days after the clinic has received the results. If you do not hear from us within 7 days, please contact the clinic through Bliss[employer] Studio[employer] or phone. If you have a critical or abnormal lab result, we will notify you by phone as soon as possible.  Submit refill requests through Veebow or call your pharmacy and they will forward the refill request to us. Please allow 3 business days for your refill to be completed.          Additional Information About Your Visit        MyChart Information     Veebow gives you secure access to your electronic health record. If you see a primary care provider, you can also send messages to your care team and make appointments. If you have questions, please call your primary care clinic.  If you do not have a primary care provider, please call [phone] and they will assist you.        Care EveryWhere ID     This is your Care EveryWhere ID. This could be used by other organizations to access your [place] medical records  [id number]        Your Vitals Were     Last Period BMI (Body Mass Index)                10/13/2017 (Approximate) 23.46 kg/m2           Blood Pressure from Last 3 Encounters:   03/27/18 92/60   02/27/18 102/62   01/30/18 102/58    Weight from  Last 3 Encounters:   03/27/18 148 lb 11.2 oz (67.4 kg)   02/27/18 143 lb 4.8 oz (65 kg)   01/30/18 140 lb 11.2 oz (63.8 kg)              We Performed the Following     Anti Treponema     CBC with platelets     Glucose tolerance, gest screen, 1 hour        Primary Care Provider Office Phone # Fax #    Dong Antonio [phone]       [address] [facility] [address]        Equal Access to Services     SHIKHA ALLEN : Hadii aad ku hadasho Soomaali, waaxda luqadaha, qaybta kaalmada adeegyada, waxay idiin hayaan adedorian khsnadoval villafuerte . So [facility] [phone].    ATENCIÓN: Si habla español, tiene a ortiz disposición servicios gratuitos de asistencia lingüística. Llame al [phone].    We comply with applicable federal civil rights laws and [place] laws. We do not discriminate on the basis of race, color, national origin, age, disability, sex, sexual orientation, or gender identity.            Thank you!     Thank you for choosing [facility]  for your care. Our goal is always to provide you with excellent care. Hearing back from our patients is one way we can continue to improve our services. Please take a few minutes to complete the written survey that you may receive in the mail after your visit with us. Thank you!             Your Updated Medication List - Protect others around you: Learn how to safely use, store and throw away your medicines at www.disposemymeds.org.          This list is accurate as of 3/27/18  2:20 PM.  Always use your most recent med list.                   Brand Name Dispense Instructions for use Diagnosis    prenatal multivitamin plus iron 27-0.8 MG Tabs per tablet     100 tablet    Take 1 tablet by mouth daily

## 2018-03-28 LAB — T PALLIDUM IGG+IGM SER QL: NEGATIVE

## 2018-04-10 ENCOUNTER — PRENATAL OFFICE VISIT (OUTPATIENT)
Dept: OBGYN | Facility: CLINIC | Age: 38
End: 2018-04-10
Payer: COMMERCIAL

## 2018-04-10 VITALS — DIASTOLIC BLOOD PRESSURE: 62 MMHG | WEIGHT: 149.4 LBS | SYSTOLIC BLOOD PRESSURE: 102 MMHG | BODY MASS INDEX: 23.57 KG/M2

## 2018-04-10 DIAGNOSIS — Z23 NEED FOR TDAP VACCINATION: ICD-10-CM

## 2018-04-10 DIAGNOSIS — Z34.83 PRENATAL CARE, SUBSEQUENT PREGNANCY IN THIRD TRIMESTER: Primary | ICD-10-CM

## 2018-04-10 PROBLEM — Z34.80 PRENATAL CARE, SUBSEQUENT PREGNANCY: Status: ACTIVE | Noted: 2018-04-10

## 2018-04-10 PROCEDURE — 90715 TDAP VACCINE 7 YRS/> IM: CPT | Performed by: OBSTETRICS & GYNECOLOGY

## 2018-04-10 PROCEDURE — 90471 IMMUNIZATION ADMIN: CPT | Performed by: OBSTETRICS & GYNECOLOGY

## 2018-04-10 PROCEDURE — 99207 ZZC PRENATAL VISIT: CPT | Performed by: OBSTETRICS & GYNECOLOGY

## 2018-04-10 NOTE — MR AVS SNAPSHOT
After Visit Summary   4/10/2018    Blas Perkins    MRN: [id number]           Patient Information     Date Of Birth          1980        Visit Information        Provider Department      4/10/2018 3:15 PM Brody Tuttle MD [facility]        Today's Diagnoses     Prenatal care, subsequent pregnancy in third trimester    -  1       Follow-ups after your visit        Your next 10 appointments already scheduled     Apr 24, 2018  2:15 PM CDT   MyChart OB-GYN Established Prenatal with Brody Tuttle MD   [facility] ([facility])    [address] [id number] [phone]              Who to contact     If you have questions or need follow up information about today's clinic visit or your schedule please contact [facility] directly at [phone].  Normal or non-critical lab and imaging results will be communicated to you by MyChart, letter or phone within 4 business days after the clinic has received the results. If you do not hear from us within 7 days, please contact the clinic through [employer] or phone. If you have a critical or abnormal lab result, we will notify you by phone as soon as possible.  Submit refill requests through AirPOS or call your pharmacy and they will forward the refill request to us. Please allow 3 business days for your refill to be completed.          Additional Information About Your Visit        MyChart Information     AirPOS gives you secure access to your electronic health record. If you see a primary care provider, you can also send messages to your care team and make appointments. If you have questions, please call your primary care clinic.  If you do not have a primary care provider, please call [phone] and they will assist you.        Care EveryWhere ID     This is your Care EveryWhere ID. This could be used by other  organizations to access your [place] medical records  [id number]        Your Vitals Were     Last Period BMI (Body Mass Index)                10/13/2017 (Approximate) 23.57 kg/m2           Blood Pressure from Last 3 Encounters:   04/10/18 102/62   03/27/18 92/60   02/27/18 102/62    Weight from Last 3 Encounters:   04/10/18 149 lb 6.4 oz (67.8 kg)   03/27/18 148 lb 11.2 oz (67.4 kg)   02/27/18 143 lb 4.8 oz (65 kg)              Today, you had the following     No orders found for display       Primary Care Provider Office Phone # Fax #    Dong Antonio [phone]       [address] [place] [address]        Equal Access to Services     SHIKHA ALLEN : Guido smith Sokatelynn, waaxda luqadaha, qaybta kaalmada adeegyada, pooja villafuerte . So [facility] [phone].    ATENCIÓN: Si habla español, tiene a ortiz disposición servicios gratuitos de asistencia lingüística. Llame al [phone].    We comply with applicable federal civil rights laws and [place] laws. We do not discriminate on the basis of race, color, national origin, age, disability, sex, sexual orientation, or gender identity.            Thank you!     Thank you for choosing [facility]  for your care. Our goal is always to provide you with excellent care. Hearing back from our patients is one way we can continue to improve our services. Please take a few minutes to complete the written survey that you may receive in the mail after your visit with us. Thank you!             Your Updated Medication List - Protect others around you: Learn how to safely use, store and throw away your medicines at www.disposemymeds.org.          This list is accurate as of 4/10/18  3:50 PM.  Always use your most recent med list.                   Brand Name Dispense Instructions for use Diagnosis    prenatal multivitamin plus iron 27-0.8 MG Tabs per tablet     100 tablet    Take 1 tablet by mouth daily

## 2018-04-10 NOTE — NURSING NOTE
"Chief Complaint   Patient presents with     Prenatal Care   28w0d  Radha Ramos MA      Initial /62 (BP Location: Right arm, Patient Position: Chair, Cuff Size: Adult Regular)  Wt 149 lb 6.4 oz (67.8 kg)  LMP 10/13/2017 (Approximate)  BMI 23.57 kg/m2 Estimated body mass index is 23.57 kg/(m^2) as calculated from the following:    Height as of 11/30/17: 5' 6.75\" (1.695 m).    Weight as of this encounter: 149 lb 6.4 oz (67.8 kg).  Medication Reconciliation: complete    "

## 2018-04-24 ENCOUNTER — RADIANT APPOINTMENT (OUTPATIENT)
Dept: ULTRASOUND IMAGING | Facility: CLINIC | Age: 38
End: 2018-04-24
Attending: OBSTETRICS & GYNECOLOGY
Payer: COMMERCIAL

## 2018-04-24 ENCOUNTER — PRENATAL OFFICE VISIT (OUTPATIENT)
Dept: OBGYN | Facility: CLINIC | Age: 38
End: 2018-04-24
Payer: COMMERCIAL

## 2018-04-24 VITALS
SYSTOLIC BLOOD PRESSURE: 108 MMHG | BODY MASS INDEX: 23.79 KG/M2 | DIASTOLIC BLOOD PRESSURE: 60 MMHG | WEIGHT: 150.74 LBS

## 2018-04-24 DIAGNOSIS — Z34.83 PRENATAL CARE, SUBSEQUENT PREGNANCY IN THIRD TRIMESTER: Primary | ICD-10-CM

## 2018-04-24 PROCEDURE — 76816 OB US FOLLOW-UP PER FETUS: CPT | Performed by: FAMILY MEDICINE

## 2018-04-24 PROCEDURE — 99207 ZZC PRENATAL VISIT: CPT | Performed by: OBSTETRICS & GYNECOLOGY

## 2018-04-24 NOTE — NURSING NOTE
"Chief Complaint   Patient presents with     Prenatal Care   30w0d  Radha Ramos MA      Initial /60 (BP Location: Left arm, Patient Position: Chair, Cuff Size: Adult Regular)  Wt 150 lb 11.8 oz (68.4 kg)  LMP 10/13/2017 (Approximate)  BMI 23.79 kg/m2 Estimated body mass index is 23.79 kg/(m^2) as calculated from the following:    Height as of 11/30/17: 5' 6.75\" (1.695 m).    Weight as of this encounter: 150 lb 11.8 oz (68.4 kg).  Medication Reconciliation: complete    "

## 2018-04-24 NOTE — MR AVS SNAPSHOT
After Visit Summary   4/24/2018    Blas Perkins    MRN: [id number]           Patient Information     Date Of Birth          1980        Visit Information        Provider Department      4/24/2018 2:15 PM Brody Tuttle MD [facility]        Today's Diagnoses     Prenatal care, subsequent pregnancy in third trimester    -  1       Follow-ups after your visit        Your next 10 appointments already scheduled     May 08, 2018  2:15 PM CDT   [facility] OB-GYN Established Prenatal with Brody Tuttle MD   [facility] ([facility])    [address] [place] [id number]   [phone]            May 22, 2018  2:45 PM CDT   [facility] Established Prenatal with Brody Tuttle MD   [facility] ([facility])    [address] [place] [id number] [phone]              Who to contact     If you have questions or need follow up information about today's clinic visit or your schedule please contact [facility] directly at [phone].  Normal or non-critical lab and imaging results will be communicated to you by MyChart, letter or phone within 4 business days after the clinic has received the results. If you do not hear from us within 7 days, please contact the clinic through MyChart or phone. If you have a critical or abnormal lab result, we will notify you by phone as soon as possible.  Submit refill requests through Fit Steps or call your pharmacy and they will forward the refill request to us. Please allow 3 business days for your refill to be completed.          Additional Information About Your Visit        MyChart Information     Fit Steps gives you secure access to your electronic health record. If you see a primary care provider, you can also send messages to your care team and make appointments. If you have  questions, please call your primary care clinic.  If you do not have a primary care provider, please call [phone] and they will assist you.        Care EveryWhere ID     This is your Care EveryWhere ID. This could be used by other organizations to access your [place] medical records  [id number]        Your Vitals Were     Last Period BMI (Body Mass Index)                10/13/2017 (Approximate) 23.79 kg/m2           Blood Pressure from Last 3 Encounters:   04/24/18 108/60   04/10/18 102/62   03/27/18 92/60    Weight from Last 3 Encounters:   04/24/18 150 lb 11.8 oz (68.4 kg)   04/10/18 149 lb 6.4 oz (67.8 kg)   03/27/18 148 lb 11.2 oz (67.4 kg)              Today, you had the following     No orders found for display       Primary Care Provider Office Phone # Fax #    Dong Antonio [phone]       [address] [facility] [address]        Equal Access to Services     SHIKHA ALLEN : Hadii aad ku hadasho Soomaali, waaxda luqadaha, qaybta kaalmada adeegyada, pooja aguirre haydevn sanchez villafuerte . So [facility] [phone].    ATENCIÓN: Si habla español, tiene a ortiz disposición servicios gratuitos de asistencia lingüística. Llame al [phone].    We comply with applicable federal civil rights laws and [place] laws. We do not discriminate on the basis of race, color, national origin, age, disability, sex, sexual orientation, or gender identity.            Thank you!     Thank you for choosing [facility]  for your care. Our goal is always to provide you with excellent care. Hearing back from our patients is one way we can continue to improve our services. Please take a few minutes to complete the written survey that you may receive in the mail after your visit with us. Thank you!             Your Updated Medication List - Protect others around you: Learn how to safely use, store and throw away your medicines at www.disposemymeds.org.          This list is accurate as  of 4/24/18  2:38 PM.  Always use your most recent med list.                   Brand Name Dispense Instructions for use Diagnosis    prenatal multivitamin plus iron 27-0.8 MG Tabs per tablet     100 tablet    Take 1 tablet by mouth daily

## 2018-05-08 ENCOUNTER — PRENATAL OFFICE VISIT (OUTPATIENT)
Dept: OBGYN | Facility: CLINIC | Age: 38
End: 2018-05-08
Payer: COMMERCIAL

## 2018-05-08 VITALS — SYSTOLIC BLOOD PRESSURE: 114 MMHG | WEIGHT: 152.1 LBS | BODY MASS INDEX: 24 KG/M2 | DIASTOLIC BLOOD PRESSURE: 60 MMHG

## 2018-05-08 DIAGNOSIS — Z34.83 PRENATAL CARE, SUBSEQUENT PREGNANCY IN THIRD TRIMESTER: Primary | ICD-10-CM

## 2018-05-08 PROCEDURE — 99207 ZZC PRENATAL VISIT: CPT | Performed by: OBSTETRICS & GYNECOLOGY

## 2018-05-08 NOTE — MR AVS SNAPSHOT
After Visit Summary   5/8/2018    Blas Perkins    MRN: [id number]           Patient Information     Date Of Birth          1980        Visit Information        Provider Department      5/8/2018 2:15 PM Brody Tuttle MD [facility]        Today's Diagnoses     Prenatal care, subsequent pregnancy in third trimester    -  1       Follow-ups after your visit        Your next 10 appointments already scheduled     May 22, 2018  2:45 PM CDT   ESTABLISHED PRENATAL with Brody Tuttle MD   [facility] ([facility])    [address] [id number] [phone]              Who to contact     If you have questions or need follow up information about today's clinic visit or your schedule please contact [facility] directly at [phone].  Normal or non-critical lab and imaging results will be communicated to you by MyChart, letter or phone within 4 business days after the clinic has received the results. If you do not hear from us within 7 days, please contact the clinic through MyChart or phone. If you have a critical or abnormal lab result, we will notify you by phone as soon as possible.  Submit refill requests through CITYBIZLIST or call your pharmacy and they will forward the refill request to us. Please allow 3 business days for your refill to be completed.          Additional Information About Your Visit        MyChart Information     CITYBIZLIST gives you secure access to your electronic health record. If you see a primary care provider, you can also send messages to your care team and make appointments. If you have questions, please call your primary care clinic.  If you do not have a primary care provider, please call [phone] and they will assist you.        Care EveryWhere ID     This is your Care EveryWhere ID. This could be used by other organizations to access your  [place] medical records  [id number]        Your Vitals Were     Last Period BMI (Body Mass Index)                10/13/2017 (Approximate) 24 kg/m2           Blood Pressure from Last 3 Encounters:   05/08/18 114/60   04/24/18 108/60   04/10/18 102/62    Weight from Last 3 Encounters:   05/08/18 152 lb 1.6 oz (69 kg)   04/24/18 150 lb 11.8 oz (68.4 kg)   04/10/18 149 lb 6.4 oz (67.8 kg)              Today, you had the following     No orders found for display       Primary Care Provider Office Phone # Fax #    Dong Antonio [phone]       [address] [facility] [address]        Equal Access to Services     SHIKHA ALLEN : Guido bellao Sokatelynn, waaxda luqadaha, qaybta kaalmada adeegyada, pooja villafuerte . So [facility] [phone].    ATENCIÓN: Si habla español, tiene a ortiz disposición servicios gratuitos de asistencia lingüística. Llame al [phone].    We comply with applicable federal civil rights laws and [place] laws. We do not discriminate on the basis of race, color, national origin, age, disability, sex, sexual orientation, or gender identity.            Thank you!     Thank you for choosing [facility]  for your care. Our goal is always to provide you with excellent care. Hearing back from our patients is one way we can continue to improve our services. Please take a few minutes to complete the written survey that you may receive in the mail after your visit with us. Thank you!             Your Updated Medication List - Protect others around you: Learn how to safely use, store and throw away your medicines at www.disposemymeds.org.          This list is accurate as of 5/8/18  2:44 PM.  Always use your most recent med list.                   Brand Name Dispense Instructions for use Diagnosis    prenatal multivitamin plus iron 27-0.8 MG Tabs per tablet     100 tablet    Take 1 tablet by mouth daily

## 2018-05-08 NOTE — NURSING NOTE
"Chief Complaint   Patient presents with     Prenatal Care   32w0d  Radha Ramos MA      Initial /60 (BP Location: Left arm, Patient Position: Chair, Cuff Size: Adult Regular)  Wt 152 lb 1.6 oz (69 kg)  LMP 10/13/2017 (Approximate)  BMI 24 kg/m2 Estimated body mass index is 24 kg/(m^2) as calculated from the following:    Height as of 17: 5' 6.75\" (1.695 m).    Weight as of this encounter: 152 lb 1.6 oz (69 kg).  BP completed using cuff size: regular        The following HM Due: NONE      The following patient reported/Care Every where data was sent to:  P ABSTRACT QUALITY INITIATIVES [[id number]]        n/a              "

## 2018-05-22 ENCOUNTER — PRENATAL OFFICE VISIT (OUTPATIENT)
Dept: OBGYN | Facility: CLINIC | Age: 38
End: 2018-05-22
Payer: COMMERCIAL

## 2018-05-22 VITALS — DIASTOLIC BLOOD PRESSURE: 60 MMHG | SYSTOLIC BLOOD PRESSURE: 108 MMHG | WEIGHT: 153.3 LBS | BODY MASS INDEX: 24.19 KG/M2

## 2018-05-22 DIAGNOSIS — O09.529 SUPERVISION OF HIGH-RISK PREGNANCY OF ELDERLY MULTIGRAVIDA: Primary | ICD-10-CM

## 2018-05-22 PROCEDURE — 99207 ZZC PRENATAL VISIT: CPT | Performed by: OBSTETRICS & GYNECOLOGY

## 2018-05-22 NOTE — NURSING NOTE
"Chief Complaint   Patient presents with     Prenatal Care   34w0d  Radha Ramos MA      Initial /60 (BP Location: Left arm, Patient Position: Chair, Cuff Size: Adult Regular)  Wt 153 lb 4.8 oz (69.5 kg)  LMP 10/13/2017 (Approximate)  BMI 24.19 kg/m2 Estimated body mass index is 24.19 kg/(m^2) as calculated from the following:    Height as of 17: 5' 6.75\" (1.695 m).    Weight as of this encounter: 153 lb 4.8 oz (69.5 kg).  BP completed using cuff size: regular        The following HM Due: NONE      The following patient reported/Care Every where data was sent to:  P ABSTRACT QUALITY INITIATIVES [[id number]]        n/a              "

## 2018-05-22 NOTE — MR AVS SNAPSHOT
After Visit Summary   5/22/2018    Blas Perkins    MRN: [id number]           Patient Information     Date Of Birth          1980        Visit Information        Provider Department      5/22/2018 2:45 PM Brody Tuttle MD [facility]        Today's Diagnoses     Supervision of high-risk pregnancy of elderly multigravida    -  1       Follow-ups after your visit        Your next 10 appointments already scheduled     Jun 05, 2018  2:15 PM CDT   [facility] OB-GYN Established Prenatal with Brody Tuttle MD   [facility]    [address] [place] [id number] [phone]            Jun 19, 2018  2:15 PM CDT   [facility] OB-GYN Established Prenatal with Brody Tuttle MD   [facility] ([facility])    [address] [place] [phone]              Who to contact     If you have questions or need follow up information about today's clinic visit or your schedule please contact [facility] directly at [phone].  Normal or non-critical lab and imaging results will be communicated to you by MyChart, letter or phone within 4 business days after the clinic has received the results. If you do not hear from us within 7 days, please contact the clinic through connex.iohart or phone. If you have a critical or abnormal lab result, we will notify you by phone as soon as possible.  Submit refill requests through [employer] or call your pharmacy and they will forward the refill request to us. Please allow 3 business days for your refill to be completed.          Additional Information About Your Visit        MyChart Information     Jp[employer]ale gives you secure access to your electronic health record. If you see a primary care provider, you can also send messages to your care team and make appointments. If you have  questions, please call your primary care clinic.  If you do not have a primary care provider, please call [phone] and they will assist you.        Care EveryWhere ID     This is your Care EveryWhere ID. This could be used by other organizations to access your [place] medical records  [id number]        Your Vitals Were     Last Period BMI (Body Mass Index)                10/13/2017 (Approximate) 24.19 kg/m2           Blood Pressure from Last 3 Encounters:   05/22/18 108/60   05/08/18 114/60   04/24/18 108/60    Weight from Last 3 Encounters:   05/22/18 153 lb 4.8 oz (69.5 kg)   05/08/18 152 lb 1.6 oz (69 kg)   04/24/18 150 lb 11.8 oz (68.4 kg)              Today, you had the following     No orders found for display       Primary Care Provider Office Phone # Fax #    Dong Antonio [phone]       [address] [facility] [address]        Equal Access to Services     SHIKHA ALLEN : Hadii aad ku hadasho Soomaali, waaxda luqadaha, qaybta kaalmada adeegyada, waxay janicein haydevn sanchez villafuerte . So [facility] [phone].    ATENCIÓN: Si habla español, tiene a ortiz disposición servicios gratuitos de asistencia lingüística. Llame al [phone].    We comply with applicable federal civil rights laws and [place] laws. We do not discriminate on the basis of race, color, national origin, age, disability, sex, sexual orientation, or gender identity.            Thank you!     Thank you for choosing [facility]  for your care. Our goal is always to provide you with excellent care. Hearing back from our patients is one way we can continue to improve our services. Please take a few minutes to complete the written survey that you may receive in the mail after your visit with us. Thank you!             Your Updated Medication List - Protect others around you: Learn how to safely use, store and throw away your medicines at www.disposemymeds.org.          This list is accurate as of  5/22/18  3:29 PM.  Always use your most recent med list.                   Brand Name Dispense Instructions for use Diagnosis    prenatal multivitamin plus iron 27-0.8 MG Tabs per tablet     100 tablet    Take 1 tablet by mouth daily

## 2018-06-05 ENCOUNTER — HOSPITAL ENCOUNTER (OUTPATIENT)
Facility: CLINIC | Age: 38
Discharge: HOME | End: 2018-06-05
Attending: OBSTETRICS & GYNECOLOGY | Admitting: OBSTETRICS & GYNECOLOGY
Payer: COMMERCIAL

## 2018-06-05 ENCOUNTER — PRENATAL OFFICE VISIT (OUTPATIENT)
Dept: OBGYN | Facility: CLINIC | Age: 38
End: 2018-06-05
Payer: COMMERCIAL

## 2018-06-05 VITALS — WEIGHT: 153.1 LBS | SYSTOLIC BLOOD PRESSURE: 108 MMHG | BODY MASS INDEX: 24.16 KG/M2 | DIASTOLIC BLOOD PRESSURE: 60 MMHG

## 2018-06-05 VITALS
RESPIRATION RATE: 16 BRPM | BODY MASS INDEX: 24.01 KG/M2 | SYSTOLIC BLOOD PRESSURE: 116 MMHG | DIASTOLIC BLOOD PRESSURE: 66 MMHG | TEMPERATURE: 98.5 F | WEIGHT: 153 LBS | HEIGHT: 67 IN

## 2018-06-05 DIAGNOSIS — O09.529 SUPERVISION OF HIGH-RISK PREGNANCY OF ELDERLY MULTIGRAVIDA: ICD-10-CM

## 2018-06-05 PROBLEM — Z36.89 ENCOUNTER FOR TRIAGE IN PREGNANT PATIENT: Status: ACTIVE | Noted: 2018-06-05

## 2018-06-05 PROCEDURE — 59025 FETAL NON-STRESS TEST: CPT

## 2018-06-05 PROCEDURE — G0463 HOSPITAL OUTPT CLINIC VISIT: HCPCS | Mod: 25

## 2018-06-05 PROCEDURE — 59025 FETAL NON-STRESS TEST: CPT | Mod: 26 | Performed by: OBSTETRICS & GYNECOLOGY

## 2018-06-05 PROCEDURE — 99207 ZZC PRENATAL VISIT: CPT | Performed by: ADVANCED PRACTICE MIDWIFE

## 2018-06-05 PROCEDURE — 87653 STREP B DNA AMP PROBE: CPT | Performed by: ADVANCED PRACTICE MIDWIFE

## 2018-06-05 RX ORDER — ONDANSETRON 2 MG/ML
4 INJECTION INTRAMUSCULAR; INTRAVENOUS EVERY 6 HOURS PRN
Status: DISCONTINUED | OUTPATIENT
Start: 2018-06-05 | End: 2018-06-05 | Stop reason: HOSPADM

## 2018-06-05 NOTE — PLAN OF CARE
Data: Patient assessed in the [facility] for NST.  Cervical exam not examined.  Membranes intact. Patient not feeling contractions.  Action:  Presumed adequate fetal oxygenation documented (see flow record). Discharge instructions reviewed.  Patient instructed to report change in fetal movement, vaginal leaking of fluid or bleeding, abdominal pain, or any concerns related to the pregnancy to her nurse/physician.    Response: Orders to discharge home per Zari Khan.  Patient verbalized understanding of education and verbalized agreement with plan. Discharged to home at 1600.

## 2018-06-05 NOTE — IP AVS SNAPSHOT
MRN:[id number]                      After Visit Summary   6/5/2018    Blas Perkins    MRN: [id number]           Thank you!     Thank you for choosing [facility] for your care. Our goal is always to provide you with excellent care. Hearing back from our patients is one way we can continue to improve our services. Please take a few minutes to complete the written survey that you may receive in the mail after you visit. If you would like to speak to someone directly about your visit please contact Patient Relations at [phone]. Thank you!          Patient Information     Date Of Birth          1980        About your hospital stay     You were admitted on:  June 5, 2018 You last received care in the:  [facility]    You were discharged on:  June 5, 2018       Who to Call     For medical emergencies, please call 911.  For non-urgent questions about your medical care, please call your primary care provider or clinic, [phone]          Attending Provider     Provider Specialty    Zari Khan MD OB/Gyn       Primary Care Provider Office Phone # Fax #    Dong Antonio [phone]      Your next 10 appointments already scheduled     Jun 19, 2018  2:15 PM CDT   MyChart OB-GYN Established Prenatal with Brody Tuttle MD   [facility] ([facility]    [address] [place] [address] [phone]              Further instructions from your care team       Discharge Instruction for Undelivered Patients      You were seen for: Fetal heart rate monitoring  We Consulted: Dr. Khan  You had (Test or Medicine): fetal and uterine monitoring     Diet:   Drink 8 to 12 glasses of liquids (milk, juice, water) every day.  You may eat meals and snacks.     Activity:  Continue normal activities    Call your provider if you notice:  Swelling in your face or increased swelling in  "your hands or legs.  Headaches that are not relieved by Tylenol (acetaminophen).  Changes in your vision (blurring: seeing spots or stars.)  Nausea (sick to your stomach) and vomiting (throwing up).   Weight gain of 5 pounds or more per week.  Heartburn that doesn't go away.  Signs of bladder infection: pain when you urinate (use the toilet), need to go more often and more urgently.  The bag of vergara (rupture of membranes) breaks, or you notice leaking in your underwear.  Bright red blood in your underwear.  Abdominal (lower belly) or stomach pain.  Second (plus) baby: Contractions (tightening) less than 10 minutes apart and getting stronger.  Increase or change in vaginal discharge (note the color and amount)    Follow-up:  As scheduled in the clinic          Pending Results     Date and Time Order Name Status Description    6/5/2018 1504 GROUP B STREP PCR In process             Admission Information     Date & Time Provider Department Dept. Phone    6/5/2018 Zari Khan MD [facility] Labor and Delivery [phone]      Your Vitals Were     Blood Pressure Temperature Respirations Height Weight Last Period    116/66 98.5  F (36.9  C) (Oral) 16 1.695 m (5' 6.75\") 69.4 kg (153 lb) 10/13/2017 (Approximate)    BMI (Body Mass Index)                   24.14 kg/m2           MyChart Information     [employer] gives you secure access to your electronic health record. If you see a primary care provider, you can also send messages to your care team and make appointments. If you have questions, please call your primary care clinic.  If you do not have a primary care provider, please call [phone] and they will assist you.        Care EveryWhere ID     This is your Care EveryWhere ID. This could be used by other organizations to access your [place] medical records  [id number]        Equal Access to Services     SHIKHA CAMARILLO: gunnar De Leon, pooja thapa " aspen kabasandoval laMarlenyflorence ah. So [facility] [phone].    ATENCIÓN: Si habla sauravañol, tiene a ortiz disposición servicios gratuitos de asistencia lingüística. Roslyn al [phone].    We comply with applicable federal civil rights laws and [place] laws. We do not discriminate on the basis of race, color, national origin, age, disability, sex, sexual orientation, or gender identity.               Review of your medicines      UNREVIEWED medicines. Ask your doctor about these medicines        Dose / Directions    prenatal multivitamin plus iron 27-0.8 MG Tabs per tablet        Dose:  1 tablet   Take 1 tablet by mouth daily   Quantity:  100 tablet   Refills:  3       ZANTAC PO        Dose:  150 mg   Take 150 mg by mouth   Refills:  0                Protect others around you: Learn how to safely use, store and throw away your medicines at www.disposemymeds.org.             Medication List: This is a list of all your medications and when to take them. Check marks below indicate your daily home schedule. Keep this list as a reference.      Medications           Morning Afternoon Evening Bedtime As Needed    prenatal multivitamin plus iron 27-0.8 MG Tabs per tablet   Take 1 tablet by mouth daily                                ZANTAC PO   Take 150 mg by mouth

## 2018-06-05 NOTE — PROVIDER NOTIFICATION
06/05/18 1556   Provider Notification   Provider Name/Title Dr. Khan   Method of Notification Phone   Request Evaluate - Remote   Notification Reason Patient Arrived;Status Update   MD updated on patient arrival (see previous note), FHT baseline 115, accels present, one audible 10 second decel to 90's, and ros but not feeling contractions. Order received to discharge patient and follow up as scheduled in clinic.

## 2018-06-05 NOTE — DISCHARGE INSTRUCTIONS
Discharge Instruction for Undelivered Patients      You were seen for: Fetal heart rate monitoring  We Consulted: Dr. Khan  You had (Test or Medicine): fetal and uterine monitoring     Diet:   Drink 8 to 12 glasses of liquids (milk, juice, water) every day.  You may eat meals and snacks.     Activity:  Continue normal activities    Call your provider if you notice:  Swelling in your face or increased swelling in your hands or legs.  Headaches that are not relieved by Tylenol (acetaminophen).  Changes in your vision (blurring: seeing spots or stars.)  Nausea (sick to your stomach) and vomiting (throwing up).   Weight gain of 5 pounds or more per week.  Heartburn that doesn't go away.  Signs of bladder infection: pain when you urinate (use the toilet), need to go more often and more urgently.  The bag of vergara (rupture of membranes) breaks, or you notice leaking in your underwear.  Bright red blood in your underwear.  Abdominal (lower belly) or stomach pain.  Second (plus) baby: Contractions (tightening) less than 10 minutes apart and getting stronger.  Increase or change in vaginal discharge (note the color and amount)    Follow-up:  As scheduled in the clinic

## 2018-06-05 NOTE — PLAN OF CARE
Data: Patient presented to [facility]: 2018  3:15 PM.  Reason for maternal/fetal assessment is NST-low baseline in clinic. Patient reports seeing a midwife in clinic today where FHT baseline was low, usually sees Dr. Tuttle. Patient is a .  Prenatal record reviewed. Pregnancy  has been complicated by tobacco use.  Gestational Age 36w0d. VSS. Fetal movement present. Patient denies uterine contractions, leaking of vaginal fluid/rupture of membranes, vaginal bleeding, abdominal pain, pelvic pressure, nausea, vomiting, headache, visual disturbances, epigastric or URQ pain, significant edema. Support person is not present.   Action: Verbal consent for EFM. Triage assessment completed. Bill of rights reviewed.  Response: Patient verbalized agreement with plan. Will contact Dr Zari Khan with update and further orders.

## 2018-06-05 NOTE — NURSING NOTE
"Chief Complaint   Patient presents with     Prenatal Care       Initial /60 (BP Location: Left arm, Patient Position: Chair, Cuff Size: Adult Regular)  Wt 153 lb 1.6 oz (69.4 kg)  LMP 10/13/2017 (Approximate)  BMI 24.16 kg/m2 Estimated body mass index is 24.16 kg/(m^2) as calculated from the following:    Height as of 17: 5' 6.75\" (1.695 m).    Weight as of this encounter: 153 lb 1.6 oz (69.4 kg).  BP completed using cuff size: regular      36w0d         "

## 2018-06-05 NOTE — MR AVS SNAPSHOT
After Visit Summary   6/5/2018    Blas Perkins    MRN: [id number]           Patient Information     Date Of Birth          1980        Visit Information        Provider Department      6/5/2018 2:15 PM Lien Juarez CNM [facility]        Today's Diagnoses     Supervision of high-risk pregnancy of elderly multigravida    -  1       Follow-ups after your visit        Follow-up notes from your care team     Return in about 1 week (around 6/12/2018) for Prenatal Visit.      Your next 10 appointments already scheduled     Jun 19, 2018  2:15 PM CDT   [facility] OB-GYN Established Prenatal with Brody Tuttle MD   [facility] ([facility])    [address] [address] [phone]              Who to contact     If you have questions or need follow up information about today's clinic visit or your schedule please contact [facility] directly at [phone].  Normal or non-critical lab and imaging results will be communicated to you by [employer], letter or phone within 4 business days after the clinic has received the results. If you do not hear from us within 7 days, please contact the clinic through [employer] or phone. If you have a critical or abnormal lab result, we will notify you by phone as soon as possible.  Submit refill requests through [employer] or call your pharmacy and they will forward the refill request to us. Please allow 3 business days for your refill to be completed.          Additional Information About Your Visit        MyChart Information     [employer] gives you secure access to your electronic health record. If you see a primary care provider, you can also send messages to your care team and make appointments. If you have questions, please call your primary care clinic.  If you do not have a primary care provider, please call [phone] and they  will assist you.        Care EveryWhere ID     This is your Care EveryWhere ID. This could be used by other organizations to access your [place] medical records  [id number]        Your Vitals Were     Last Period BMI (Body Mass Index)                10/13/2017 (Approximate) 24.16 kg/m2           Blood Pressure from Last 3 Encounters:   06/05/18 108/60   05/22/18 108/60   05/08/18 114/60    Weight from Last 3 Encounters:   06/05/18 153 lb 1.6 oz (69.4 kg)   05/22/18 153 lb 4.8 oz (69.5 kg)   05/08/18 152 lb 1.6 oz (69 kg)              Today, you had the following     No orders found for display       Primary Care Provider Office Phone # Fax #    Dong Antonio [phone]       [address] [facility] [address]        Equal Access to Services     [facility]: Hadii aad ku hadasho Soomaali, waaxda luqadaha, qaybta kaalmada adeegyada, waxay janicein hayaan sanchez villafuerte . So [facility] [phone].    ATENCIÓN: Si habla español, tiene a ortiz disposición servicios gratuitos de asistencia lingüística. Llame al [phone].    We comply with applicable federal civil rights laws and [place] laws. We do not discriminate on the basis of race, color, national origin, age, disability, sex, sexual orientation, or gender identity.            Thank you!     Thank you for choosing [facility]  for your care. Our goal is always to provide you with excellent care. Hearing back from our patients is one way we can continue to improve our services. Please take a few minutes to complete the written survey that you may receive in the mail after your visit with us. Thank you!             Your Updated Medication List - Protect others around you: Learn how to safely use, store and throw away your medicines at www.disposemymeds.org.          This list is accurate as of 6/5/18  2:59 PM.  Always use your most recent med list.                   Brand Name Dispense Instructions for use Diagnosis     prenatal multivitamin plus iron 27-0.8 MG Tabs per tablet     100 tablet    Take 1 tablet by mouth daily

## 2018-06-05 NOTE — PROGRESS NOTES
S: Feels well,  Baby active.  Denies uterine cramping, vaginal bleeding or leaking of fluid. No headache, increase in edema, no epigastric pain.   O: Vitals: /60 (BP Location: Left arm, Patient Position: Chair, Cuff Size: Adult Regular)  Wt 153 lb 1.6 oz (69.4 kg)  LMP 10/13/2017 (Approximate)  BMI 24.16 kg/m2  BMI= Body mass index is 24.16 kg/(m^2).  Exam:  Constitutional: healthy, alert and no distress  Respiratory: respirations even and unlabored  Gastrointestinal: Abdomen soft, non-tender. Fundus measures appropriate for gestational age. Fetal heart tones hear without difficulty and within normal limits  : Normal external genitalia without lesions. GBS collected  Psychiatric: mentation appears normal and affect normal/bright  A:     ICD-10-CM    1. Supervision of high-risk pregnancy of elderly multigravida O09.529      P: FHT's 100-110 over 1 minute with Doppler. Normal fetal movement Sent to [facility] to L&D for NST. Notified on call OB and [facility] nurse.   GBS screen completed.   Return to clinic in 1 week if all is normal with NST    Lien Juarez CNM, ORLY-BC

## 2018-06-05 NOTE — IP AVS SNAPSHOT
[facility] Labor and Delivery    [address]    [facility] [address]    Phone:  [phone]    Fax:  [phone]                                       After Visit Summary   6/5/2018    Blas Perkins    MRN: [id number]           After Visit Summary Signature Page     I have received my discharge instructions, and my questions have been answered. I have discussed any challenges I see with this plan with the nurse or doctor.    ..........................................................................................................................................  Patient/Patient Representative Signature      ..........................................................................................................................................  Patient Representative Print Name and Relationship to Patient    ..................................................               ................................................  Date                                            Time    ..........................................................................................................................................  Reviewed by Signature/Title    ...................................................              ..............................................  Date                                                            Time

## 2018-06-06 LAB
GP B STREP DNA SPEC QL NAA+PROBE: NEGATIVE
SPECIMEN SOURCE: NORMAL

## 2018-06-11 ENCOUNTER — PRENATAL OFFICE VISIT (OUTPATIENT)
Dept: OBGYN | Facility: CLINIC | Age: 38
End: 2018-06-11
Payer: COMMERCIAL

## 2018-06-11 VITALS — WEIGHT: 154.1 LBS | DIASTOLIC BLOOD PRESSURE: 76 MMHG | SYSTOLIC BLOOD PRESSURE: 108 MMHG | BODY MASS INDEX: 24.32 KG/M2

## 2018-06-11 DIAGNOSIS — Z34.83 PRENATAL CARE, SUBSEQUENT PREGNANCY IN THIRD TRIMESTER: Primary | ICD-10-CM

## 2018-06-11 PROCEDURE — 99207 ZZC PRENATAL VISIT: CPT | Performed by: OBSTETRICS & GYNECOLOGY

## 2018-06-11 NOTE — NURSING NOTE
"Chief Complaint   Patient presents with     Prenatal Care   36w6d  Radha Ramos MA      Initial /76 (BP Location: Right arm, Patient Position: Chair, Cuff Size: Adult Regular)  Wt 154 lb 1.6 oz (69.9 kg)  LMP 10/13/2017 (Approximate)  BMI 24.32 kg/m2 Estimated body mass index is 24.32 kg/(m^2) as calculated from the following:    Height as of 18: 5' 6.75\" (1.695 m).    Weight as of this encounter: 154 lb 1.6 oz (69.9 kg).  BP completed using cuff size: regular        The following HM Due: NONE      The following patient reported/Care Every where data was sent to:  P ABSTRACT QUALITY INITIATIVES [01796]                     "

## 2018-06-11 NOTE — MR AVS SNAPSHOT
After Visit Summary   6/11/2018    Blas Perkins    MRN: [id number]           Patient Information     Date Of Birth          1980        Visit Information        Provider Department      6/11/2018 6:00 PM Brody Tuttle MD [facility]        Today's Diagnoses     Prenatal care, subsequent pregnancy in third trimester    -  1    SGA (small for gestational age)           Follow-ups after your visit        Your next 10 appointments already scheduled     Jun 27, 2018 10:30 AM CDT   US OB SINGLE FOLLOW UP REPEAT with RIUS1   [facility] ([facility])    [address]  [address]  [facility] [id number] [phone]           Please bring a list of your medicines (including vitamins, minerals and over-the-counter drugs). Also, tell your doctor about any allergies you may have. Wear comfortable clothes and leave your valuables at home.  If you re less than 20 weeks drink four 8-ounce glasses of fluid an hour before your exam. If you need to empty your bladder before your exam, try to release only a little urine. Then, drink another glass of fluid.  You may have up to two family members in the exam room. If you bring a small child, an adult must be there to care for him or her.  Please call the Imaging Department at your exam site with any questions.              Who to contact     If you have questions or need follow up information about today's clinic visit or your schedule please contact [facility] directly at [phone].  Normal or non-critical lab and imaging results will be communicated to you by MyChart, letter or phone within 4 business days after the clinic has received the results. If you do not hear from us within 7 days, please contact the clinic through MyChart or phone. If you have a critical or abnormal lab result, we will notify you by phone as soon as possible.  Submit refill requests through  [employer] or call your pharmacy and they will forward the refill request to us. Please allow 3 business days for your refill to be completed.          Additional Information About Your Visit        MyChart Information     [employer] gives you secure access to your electronic health record. If you see a primary care provider, you can also send messages to your care team and make appointments. If you have questions, please call your primary care clinic.  If you do not have a primary care provider, please call [phone] and they will assist you.        Care EveryWhere ID     This is your Care EveryWhere ID. This could be used by other organizations to access your [place] medical records  [id number]        Your Vitals Were     Last Period BMI (Body Mass Index)                10/13/2017 (Approximate) 24.32 kg/m2           Blood Pressure from Last 3 Encounters:   06/19/18 98/62   06/11/18 108/76   06/05/18 116/66    Weight from Last 3 Encounters:   06/19/18 156 lb 11.2 oz (71.1 kg)   06/11/18 154 lb 1.6 oz (69.9 kg)   06/05/18 153 lb (69.4 kg)               Primary Care Provider Office Phone # Fax #    Dong Antonio [phone]       [address] [place] [id number]        Equal Access to Services     SHIKHA ALLEN AH: Hadii aad ku hadasho Soomaali, waaxda luqadaha, qaybta kaalmada adeegyada, waxay idiin haydevn sanchez palacio. So [facility] [phone].    ATENCIÓN: Si habla español, tiene a ortiz disposición servicios gratuitos de asistencia lingüística. ame al [phone].    We comply with applicable federal civil rights laws and [place] laws. We do not discriminate on the basis of race, color, national origin, age, disability, sex, sexual orientation, or gender identity.            Thank you!     Thank you for choosing [facility]  for your care. Our goal is always to provide you with excellent care. Hearing back from our patients is one way we can continue to improve our  services. Please take a few minutes to complete the written survey that you may receive in the mail after your visit with us. Thank you!             Your Updated Medication List - Protect others around you: Learn how to safely use, store and throw away your medicines at www.disposemymeds.org.          This list is accurate as of 6/11/18 11:59 PM.  Always use your most recent med list.                   Brand Name Dispense Instructions for use Diagnosis    prenatal multivitamin plus iron 27-0.8 MG Tabs per tablet     100 tablet    Take 1 tablet by mouth daily        ZANTAC PO      Take 150 mg by mouth

## 2018-06-19 ENCOUNTER — PRENATAL OFFICE VISIT (OUTPATIENT)
Dept: OBGYN | Facility: CLINIC | Age: 38
End: 2018-06-19
Payer: COMMERCIAL

## 2018-06-19 VITALS
HEART RATE: 68 BPM | DIASTOLIC BLOOD PRESSURE: 62 MMHG | SYSTOLIC BLOOD PRESSURE: 98 MMHG | WEIGHT: 156.7 LBS | BODY MASS INDEX: 24.73 KG/M2

## 2018-06-19 DIAGNOSIS — Z34.83 PRENATAL CARE, SUBSEQUENT PREGNANCY IN THIRD TRIMESTER: Primary | ICD-10-CM

## 2018-06-19 PROCEDURE — 99207 ZZC PRENATAL VISIT: CPT | Performed by: OBSTETRICS & GYNECOLOGY

## 2018-06-19 NOTE — NURSING NOTE
"  Chief Complaint   Patient presents with     Prenatal Care     38 weeks 0 days, no questions or concerns       Initial BP 98/62 (BP Location: Left arm, Patient Position: Chair, Cuff Size: Adult Regular)  Pulse 68  Wt 156 lb 11.2 oz (71.1 kg)  LMP 10/13/2017 (Approximate)  BMI 24.73 kg/m2 Estimated body mass index is 24.73 kg/(m^2) as calculated from the following:    Height as of 6/5/18: 5' 6.75\" (1.695 m).    Weight as of this encounter: 156 lb 11.2 oz (71.1 kg).  Medication Reconciliation: complete    Ngozi Villalta CMA      "

## 2018-06-19 NOTE — MR AVS SNAPSHOT
After Visit Summary   6/19/2018    Blas Perkins    MRN: [id number]           Patient Information     Date Of Birth          1980        Visit Information        Provider Department      6/19/2018 2:15 PM Brody Tuttle MD [facility]        Today's Diagnoses     Prenatal care, subsequent pregnancy in third trimester    -  1       Follow-ups after your visit        Your next 10 appointments already scheduled     Jun 27, 2018 10:30 AM CDT   US OB SINGLE FOLLOW UP REPEAT with RIUS1   [facility] ([facility]    [address]  [address]  [facility] [address] [phone]           Please bring a list of your medicines (including vitamins, minerals and over-the-counter drugs). Also, tell your doctor about any allergies you may have. Wear comfortable clothes and leave your valuables at home.  If you re less than 20 weeks drink four 8-ounce glasses of fluid an hour before your exam. If you need to empty your bladder before your exam, try to release only a little urine. Then, drink another glass of fluid.  You may have up to two family members in the exam room. If you bring a small child, an adult must be there to care for him or her.  Please call the Imaging Department at your exam site with any questions.              Who to contact     If you have questions or need follow up information about today's clinic visit or your schedule please contact [facility] directly at [phone].  Normal or non-critical lab and imaging results will be communicated to you by MyChart, letter or phone within 4 business days after the clinic has received the results. If you do not hear from us within 7 days, please contact the clinic through MyChart or phone. If you have a critical or abnormal lab result, we will notify you by phone as soon as possible.  Submit refill requests through [employer]hart or call your  pharmacy and they will forward the refill request to us. Please allow 3 business days for your refill to be completed.          Additional Information About Your Visit        MyChart Information     [employer] gives you secure access to your electronic health record. If you see a primary care provider, you can also send messages to your care team and make appointments. If you have questions, please call your primary care clinic.  If you do not have a primary care provider, please call [phone] and they will assist you.        Care EveryWhere ID     This is your Care EveryWhere ID. This could be used by other organizations to access your [place] medical records  [id number]        Your Vitals Were     Pulse Last Period BMI (Body Mass Index)             68 10/13/2017 (Approximate) 24.73 kg/m2          Blood Pressure from Last 3 Encounters:   06/19/18 98/62   06/11/18 108/76   06/05/18 116/66    Weight from Last 3 Encounters:   06/19/18 156 lb 11.2 oz (71.1 kg)   06/11/18 154 lb 1.6 oz (69.9 kg)   06/05/18 153 lb (69.4 kg)              Today, you had the following     No orders found for display       Primary Care Provider Office Phone # Fax #    Dong Antonio [phone]       [address] [facility] [address]        Equal Access to Services     SHIKHA ALLEN : Hadii aad ku hadasho Soomaali, waaxda luqadaha, qaybta kaalmada adeegyada, waxwicho aguirre hayflorence villafuerte . So [facility] [phone].    ATENCIÓN: Si habla español, tiene a ortiz disposición servicios gratuitos de asistencia lingüística. Llame al [phone].    We comply with applicable federal civil rights laws and [place] laws. We do not discriminate on the basis of race, color, national origin, age, disability, sex, sexual orientation, or gender identity.            Thank you!     Thank you for choosing [facility]  for your care. Our goal is always to provide you with excellent care. Hearing back from our  patients is one way we can continue to improve our services. Please take a few minutes to complete the written survey that you may receive in the mail after your visit with us. Thank you!             Your Updated Medication List - Protect others around you: Learn how to safely use, store and throw away your medicines at www.disposemymeds.org.          This list is accurate as of 6/19/18  2:41 PM.  Always use your most recent med list.                   Brand Name Dispense Instructions for use Diagnosis    prenatal multivitamin plus iron 27-0.8 MG Tabs per tablet     100 tablet    Take 1 tablet by mouth daily        ZANTAC PO      Take 150 mg by mouth

## 2018-06-27 ENCOUNTER — RADIANT APPOINTMENT (OUTPATIENT)
Dept: ULTRASOUND IMAGING | Facility: CLINIC | Age: 38
End: 2018-06-27
Attending: OBSTETRICS & GYNECOLOGY
Payer: COMMERCIAL

## 2018-06-27 ENCOUNTER — PRENATAL OFFICE VISIT (OUTPATIENT)
Dept: OBGYN | Facility: CLINIC | Age: 38
End: 2018-06-27
Payer: COMMERCIAL

## 2018-06-27 VITALS — DIASTOLIC BLOOD PRESSURE: 72 MMHG | BODY MASS INDEX: 24.38 KG/M2 | SYSTOLIC BLOOD PRESSURE: 112 MMHG | WEIGHT: 154.5 LBS

## 2018-06-27 DIAGNOSIS — Z34.83 PRENATAL CARE, SUBSEQUENT PREGNANCY IN THIRD TRIMESTER: Primary | ICD-10-CM

## 2018-06-27 PROCEDURE — 59426 ANTEPARTUM CARE ONLY: CPT | Performed by: OBSTETRICS & GYNECOLOGY

## 2018-06-27 PROCEDURE — 76816 OB US FOLLOW-UP PER FETUS: CPT | Performed by: OBSTETRICS & GYNECOLOGY

## 2018-06-27 PROCEDURE — 99207 ZZC PRENATAL VISIT: CPT | Performed by: OBSTETRICS & GYNECOLOGY

## 2018-06-27 NOTE — MR AVS SNAPSHOT
After Visit Summary   6/27/2018    Blas Perkins    MRN: [id number]           Patient Information     Date Of Birth          1980        Visit Information        Provider Department      6/27/2018 11:45 AM Brody Tuttle MD [facility]        Today's Diagnoses     Prenatal care, subsequent pregnancy in third trimester    -  1       Follow-ups after your visit        Your next 10 appointments already scheduled     Jun 27, 2018 11:45 AM CDT   ESTABLISHED PRENATAL with Brody Tuttle MD   [facility]    [address]  [facility] [id number] [phone]              Who to contact     If you have questions or need follow up information about today's clinic visit or your schedule please contact [facility] directly at [phone].  Normal or non-critical lab and imaging results will be communicated to you by MyChart, letter or phone within 4 business days after the clinic has received the results. If you do not hear from us within 7 days, please contact the clinic through MyChart or phone. If you have a critical or abnormal lab result, we will notify you by phone as soon as possible.  Submit refill requests through [employer] or call your pharmacy and they will forward the refill request to us. Please allow 3 business days for your refill to be completed.          Additional Information About Your Visit        MyChart Information     [employer] gives you secure access to your electronic health record. If you see a primary care provider, you can also send messages to your care team and make appointments. If you have questions, please call your primary care clinic.  If you do not have a primary care provider, please call [phone] and they will assist you.        Care EveryWhere ID     This is your Care EveryWhere ID. This could be used by other organizations to access your [facility]  records  [id number]        Your Vitals Were     Last Period BMI (Body Mass Index)                10/13/2017 (Approximate) 24.38 kg/m2           Blood Pressure from Last 3 Encounters:   06/27/18 112/72   06/19/18 98/62   06/11/18 108/76    Weight from Last 3 Encounters:   06/27/18 154 lb 8 oz (70.1 kg)   06/19/18 156 lb 11.2 oz (71.1 kg)   06/11/18 154 lb 1.6 oz (69.9 kg)              Today, you had the following     No orders found for display       Primary Care Provider Office Phone # Fax #    Dong Antonio [phone]       [address] [facility] [address]        Equal Access to Services     SHIKHA ALLEN : Guido smith Sokatelynn, waaxda luqadaha, qaybta kaalmada adeegyada, pooja villafuerte . So [facility] [phone].    ATENCIÓN: Si habla español, tiene a ortiz disposición servicios gratuitos de asistencia lingüística. Llame al [phone].    We comply with applicable federal civil rights laws and [place] laws. We do not discriminate on the basis of race, color, national origin, age, disability, sex, sexual orientation, or gender identity.            Thank you!     Thank you for choosing [facility]  for your care. Our goal is always to provide you with excellent care. Hearing back from our patients is one way we can continue to improve our services. Please take a few minutes to complete the written survey that you may receive in the mail after your visit with us. Thank you!             Your Updated Medication List - Protect others around you: Learn how to safely use, store and throw away your medicines at www.disposemymeds.org.          This list is accurate as of 6/27/18 11:24 AM.  Always use your most recent med list.                   Brand Name Dispense Instructions for use Diagnosis    prenatal multivitamin plus iron 27-0.8 MG Tabs per tablet     100 tablet    Take 1 tablet by mouth daily        ZANTAC PO      Take 150 mg by mouth

## 2018-06-27 NOTE — NURSING NOTE
"Chief Complaint   Patient presents with     Prenatal Care   39w1d  Radha Ramos MA      Initial /72 (BP Location: Right arm, Patient Position: Chair, Cuff Size: Adult Regular)  Wt 154 lb 8 oz (70.1 kg)  LMP 10/13/2017 (Approximate)  BMI 24.38 kg/m2 Estimated body mass index is 24.38 kg/(m^2) as calculated from the following:    Height as of 18: 5' 6.75\" (1.695 m).    Weight as of this encounter: 154 lb 8 oz (70.1 kg).  BP completed using cuff size: regular        The following HM Due: NONE      The following patient reported/Care Every where data was sent to:  P ABSTRACT QUALITY INITIATIVES [19115]                      "

## 2018-07-03 ENCOUNTER — PRENATAL OFFICE VISIT (OUTPATIENT)
Dept: OBGYN | Facility: CLINIC | Age: 38
End: 2018-07-03
Payer: COMMERCIAL

## 2018-07-03 VITALS
DIASTOLIC BLOOD PRESSURE: 70 MMHG | BODY MASS INDEX: 24.01 KG/M2 | HEIGHT: 67 IN | SYSTOLIC BLOOD PRESSURE: 96 MMHG | WEIGHT: 153 LBS

## 2018-07-03 DIAGNOSIS — Z34.83 PRENATAL CARE, SUBSEQUENT PREGNANCY IN THIRD TRIMESTER: Primary | ICD-10-CM

## 2018-07-03 PROCEDURE — 99212 OFFICE O/P EST SF 10 MIN: CPT | Performed by: ADVANCED PRACTICE MIDWIFE

## 2018-07-03 NOTE — NURSING NOTE
"Chief Complaint   Patient presents with     Prenatal Care       Initial BP 96/70  Ht 5' 7\" (1.702 m)  Wt 153 lb (69.4 kg)  LMP 10/13/2017 (Approximate)  BMI 23.96 kg/m2 Estimated body mass index is 23.96 kg/(m^2) as calculated from the following:    Height as of this encounter: 5' 7\" (1.702 m).    Weight as of this encounter: 153 lb (69.4 kg).  BP completed using cuff size: regular      40w0d    Claudine Patel CMA                "

## 2018-07-03 NOTE — MR AVS SNAPSHOT
After Visit Summary   7/3/2018    Blas Perkins    MRN: [id number]           Patient Information     Date Of Birth          1980        Visit Information        Provider Department      7/3/2018 10:15 AM Lien Juarez CNM [facility]        Today's Diagnoses     Prenatal care, subsequent pregnancy in third trimester    -  1       Follow-ups after your visit        Follow-up notes from your care team     Return in about 1 week (around 7/10/2018) for Prenatal Visit.      Your next 10 appointments already scheduled     Jul 09, 2018  2:00 PM CDT   [facility] Established Prenatal with [facility]uttle [facility]   [facility]    [address]  [facility] [address] [phone]              Who to contact     If you have questions or need follow up information about today's clinic visit or your schedule please contact [facility] directly at [phone].  Normal or non-critical lab and imaging results will be communicated to you by [employer], letter or phone within 4 business days after the clinic has received the results. If you do not hear from us within 7 days, please contact the clinic through ZeroMailt or phone. If you have a critical or abnormal lab result, we will notify you by phone as soon as possible.  Submit refill requests through [employer]g or call your pharmacy and they will forward the refill request to us. Please allow 3 business days for your refill to be completed.          Additional Information About Your Visit        [employer] Information     Social[employer] gives you secure access to your electronic health record. If you see a primary care provider, you can also send messages to your care team and make appointments. If you have questions, please call your primary care clinic.  If you do not have a primary care provider, please call [phone] and they will assist you.       "  Care EveryWhere ID     This is your Care EveryWhere ID. This could be used by other organizations to access your [place] medical records  [id number]        Your Vitals Were     Height Last Period BMI (Body Mass Index)             5' 7\" (1.702 m) 10/13/2017 (Approximate) 23.96 kg/m2          Blood Pressure from Last 3 Encounters:   07/03/18 96/70   06/27/18 112/72   06/19/18 98/62    Weight from Last 3 Encounters:   07/03/18 153 lb (69.4 kg)   06/27/18 154 lb 8 oz (70.1 kg)   06/19/18 156 lb 11.2 oz (71.1 kg)              Today, you had the following     No orders found for display       Primary Care Provider Office Phone # Fax #    Dong Antonio [phone]       [address] [facility] [address]        Equal Access to Services     [facility] : Hadii aad ku hadasho Soomaali, waaxda luqadaha, qaybta kaalmada adeegyada, waxay janicein haydevn sanchez villafuerte . So [facility] [phone].    ATENCIÓN: Si habla español, tiene a ortiz disposición servicios gratuitos de asistencia lingüística. Llame al [phone].    We comply with applicable federal civil rights laws and [place] laws. We do not discriminate on the basis of race, color, national origin, age, disability, sex, sexual orientation, or gender identity.            Thank you!     Thank you for choosing [facility]  for your care. Our goal is always to provide you with excellent care. Hearing back from our patients is one way we can continue to improve our services. Please take a few minutes to complete the written survey that you may receive in the mail after your visit with us. Thank you!             Your Updated Medication List - Protect others around you: Learn how to safely use, store and throw away your medicines at www.disposemymeds.org.          This list is accurate as of 7/3/18 10:31 AM.  Always use your most recent med list.                   Brand Name Dispense Instructions for use Diagnosis    prenatal " multivitamin plus iron 27-0.8 MG Tabs per tablet     100 tablet    Take 1 tablet by mouth daily        ZANTAC PO      Take 150 mg by mouth

## 2018-07-03 NOTE — PROGRESS NOTES
"S: Feels well,  Baby active.  Denies uterine cramping, vaginal bleeding or leaking of fluid. No headache, increase in edema, no epigastric pain.   O: Vitals: BP 96/70  Ht 5' 7\" (1.702 m)  Wt 153 lb (69.4 kg)  LMP 10/13/2017 (Approximate)  BMI 23.96 kg/m2  BMI= Body mass index is 23.96 kg/(m^2).  Exam:  Constitutional: healthy, alert and no distress  Respiratory: respirations even and unlabored  Gastrointestinal: Abdomen soft, non-tender. Fundus measures appropriate for gestational age. Fetal heart tones hear without difficulty and within normal limits  : Deferred  Psychiatric: mentation appears normal and affect normal/bright  A:     ICD-10-CM    1. Prenatal care, subsequent pregnancy in third trimester Z34.83      P: Labor signs and symptoms discussed, aware of numbers to call.  Discussed warning signs of PIH/preeclampsia and patient will monitor.  Plans induction at 41 weeks. Appointment with Dr. Tuttle scheduled for Monday at 40+6 weeks to plan induction      Lien Juarez CNM, ORLY-BC            "

## 2018-07-09 ENCOUNTER — PRENATAL OFFICE VISIT (OUTPATIENT)
Dept: OBGYN | Facility: CLINIC | Age: 38
End: 2018-07-09
Payer: COMMERCIAL

## 2018-07-09 VITALS — DIASTOLIC BLOOD PRESSURE: 66 MMHG | BODY MASS INDEX: 23.96 KG/M2 | SYSTOLIC BLOOD PRESSURE: 108 MMHG | WEIGHT: 153 LBS

## 2018-07-09 DIAGNOSIS — Z34.83 PRENATAL CARE, SUBSEQUENT PREGNANCY IN THIRD TRIMESTER: Primary | ICD-10-CM

## 2018-07-09 PROCEDURE — 99212 OFFICE O/P EST SF 10 MIN: CPT | Performed by: OBSTETRICS & GYNECOLOGY

## 2018-07-09 NOTE — MR AVS SNAPSHOT
After Visit Summary   7/9/2018    Blas Perkins    MRN: [id number]           Patient Information     Date Of Birth          1980        Visit Information        Provider Department      7/9/2018 2:00 PM Brody Tuttle MD [facility]        Today's Diagnoses     Prenatal care, subsequent pregnancy in third trimester    -  1       Follow-ups after your visit        Who to contact     If you have questions or need follow up information about today's clinic visit or your schedule please contact [facility] directly at [phone].  Normal or non-critical lab and imaging results will be communicated to you by Gliphohart, letter or phone within 4 business days after the clinic has received the results. If you do not hear from us within 7 days, please contact the clinic through Acc[employer] or phone. If you have a critical or abnormal lab result, we will notify you by phone as soon as possible.  Submit refill requests through [employer] or call your pharmacy and they will forward the refill request to us. Please allow 3 business days for your refill to be completed.          Additional Information About Your Visit        MyChart Information     The Society gives you secure access to your electronic health record. If you see a primary care provider, you can also send messages to your care team and make appointments. If you have questions, please call your primary care clinic.  If you do not have a primary care provider, please call [phone] and they will assist you.        Care EveryWhere ID     This is your Care EveryWhere ID. This could be used by other organizations to access your [place] medical records  [id number]        Your Vitals Were     Last Period Breastfeeding? BMI (Body Mass Index)             10/13/2017 (Approximate) No 23.96 kg/m2          Blood Pressure from Last 3 Encounters:   07/09/18 108/66   07/03/18 96/70   06/27/18 112/72    Weight from Last 3  Encounters:   07/09/18 153 lb (69.4 kg)   07/03/18 153 lb (69.4 kg)   06/27/18 154 lb 8 oz (70.1 kg)              Today, you had the following     No orders found for display       Primary Care Provider Office Phone # Fax #    Dong Antonio [phone]       [address] [facility] [address]        Equal Access to Services     SHIKHA ALLEN : Hadii aad ku hadasho Soomaali, waaxda luqadaha, qaybta kaalmada adeegyada, waxay idiin hayaan adedorian kabajmdoretha labrennan . So [facility] [phone].    ATENCIÓN: Si habla español, tiene a ortiz disposición servicios gratuitos de asistencia lingüística. Llame al [phone].    We comply with applicable federal civil rights laws and [place] laws. We do not discriminate on the basis of race, color, national origin, age, disability, sex, sexual orientation, or gender identity.            Thank you!     Thank you for choosing [facility]  for your care. Our goal is always to provide you with excellent care. Hearing back from our patients is one way we can continue to improve our services. Please take a few minutes to complete the written survey that you may receive in the mail after your visit with us. Thank you!             Your Updated Medication List - Protect others around you: Learn how to safely use, store and throw away your medicines at www.disposemymeds.org.          This list is accurate as of 7/9/18  7:22 PM.  Always use your most recent med list.                   Brand Name Dispense Instructions for use Diagnosis    prenatal multivitamin plus iron 27-0.8 MG Tabs per tablet     100 tablet    Take 1 tablet by mouth daily        ZANTAC PO      Take 150 mg by mouth

## 2018-07-09 NOTE — NURSING NOTE
"Chief Complaint   Patient presents with     Prenatal Care       Initial /66  Wt 153 lb (69.4 kg)  LMP 10/13/2017 (Approximate)  Breastfeeding? No  BMI 23.96 kg/m2 Estimated body mass index is 23.96 kg/(m^2) as calculated from the following:    Height as of 7/3/18: 5' 7\" (1.702 m).    Weight as of this encounter: 153 lb (69.4 kg).  BP completed using cuff size: regular          40w6d  + FM daily  - contractions  - bleeding or leaking of fuid  + heartburn  Claudine Borges LPN               "

## 2018-07-11 ENCOUNTER — HOSPITAL ENCOUNTER (INPATIENT)
Facility: CLINIC | Age: 38
LOS: 3 days | Discharge: HOME | End: 2018-07-14
Attending: FAMILY MEDICINE | Admitting: OBSTETRICS & GYNECOLOGY
Payer: COMMERCIAL

## 2018-07-11 LAB
ABO + RH BLD: NORMAL
ABO + RH BLD: NORMAL
AMPHETAMINES UR QL SCN: NEGATIVE
CANNABINOIDS UR QL: NEGATIVE
COCAINE UR QL: NEGATIVE
HGB BLD-MCNC: 11.8 G/DL (ref 11.7–15.7)
OPIATES UR QL SCN: NEGATIVE
PCP UR QL SCN: NEGATIVE
SPECIMEN EXP DATE BLD: NORMAL

## 2018-07-11 PROCEDURE — 85018 HEMOGLOBIN: CPT | Performed by: FAMILY MEDICINE

## 2018-07-11 PROCEDURE — 25000132 ZZH RX MED GY IP 250 OP 250 PS 637: Performed by: FAMILY MEDICINE

## 2018-07-11 PROCEDURE — 80307 DRUG TEST PRSMV CHEM ANLYZR: CPT | Performed by: FAMILY MEDICINE

## 2018-07-11 PROCEDURE — 86780 TREPONEMA PALLIDUM: CPT | Performed by: FAMILY MEDICINE

## 2018-07-11 PROCEDURE — 86901 BLOOD TYPING SEROLOGIC RH(D): CPT | Performed by: FAMILY MEDICINE

## 2018-07-11 PROCEDURE — 86900 BLOOD TYPING SEROLOGIC ABO: CPT | Performed by: FAMILY MEDICINE

## 2018-07-11 PROCEDURE — 12000031 ZZH R&B OB CRITICAL

## 2018-07-11 RX ORDER — NALOXONE HYDROCHLORIDE 0.4 MG/ML
.1-.4 INJECTION, SOLUTION INTRAMUSCULAR; INTRAVENOUS; SUBCUTANEOUS
Status: DISCONTINUED | OUTPATIENT
Start: 2018-07-11 | End: 2018-07-12

## 2018-07-11 RX ORDER — MISOPROSTOL 100 UG/1
25 TABLET ORAL
Status: DISCONTINUED | OUTPATIENT
Start: 2018-07-11 | End: 2018-07-13 | Stop reason: CLARIF

## 2018-07-11 RX ORDER — FENTANYL CITRATE 50 UG/ML
50-100 INJECTION, SOLUTION INTRAMUSCULAR; INTRAVENOUS
Status: DISCONTINUED | OUTPATIENT
Start: 2018-07-11 | End: 2018-07-13 | Stop reason: CLARIF

## 2018-07-11 RX ORDER — CALCIUM CARBONATE 500 MG/1
1000 TABLET, CHEWABLE ORAL
Status: DISCONTINUED | OUTPATIENT
Start: 2018-07-11 | End: 2018-07-14 | Stop reason: HOSPADM

## 2018-07-11 RX ORDER — ZOLPIDEM TARTRATE 5 MG/1
5 TABLET ORAL
Status: COMPLETED | OUTPATIENT
Start: 2018-07-11 | End: 2018-07-11

## 2018-07-11 RX ADMIN — CALCIUM CARBONATE (ANTACID) CHEW TAB 500 MG 1000 MG: 500 CHEW TAB at 23:17

## 2018-07-11 RX ADMIN — Medication 25 MCG: at 23:09

## 2018-07-11 RX ADMIN — ZOLPIDEM TARTRATE 5 MG: 5 TABLET, COATED ORAL at 23:18

## 2018-07-11 RX ADMIN — Medication 25 MCG: at 20:54

## 2018-07-11 NOTE — IP AVS SNAPSHOT
MRN:[id number]                      After Visit Summary   7/11/2018    Blas Perkins    MRN: [id number]           Thank you!     Thank you for choosing [facility] for your care. Our goal is always to provide you with excellent care. Hearing back from our patients is one way we can continue to improve our services. Please take a few minutes to complete the written survey that you may receive in the mail after you visit. If you would like to speak to someone directly about your visit please contact Patient Relations at [phone]. Thank you!          Patient Information     Date Of Birth          1980        About your hospital stay     You were admitted on:  July 11, 2018 You last received care in the:  [facility]    You were discharged on:  July 14, 2018        Reason for your hospital stay       IUP at 41.2 weeks induction of labor  S/p vag del                  Who to Call     For medical emergencies, please call 911.  For non-urgent questions about your medical care, please call your primary care provider or clinic, [phone]          Attending Provider     Provider Specialty    Radha Kimbrough DO OB/Gyn    Zari Khan MD OB/Gyn       Primary Care Provider Office Phone # Fax #    Dong Antonio [phone]      After Care Instructions     Activity       Your activity upon discharge: activity as tolerated, no sex for 6 weeks and no driving while on analgesics            Diet       Follow this diet upon discharge: Orders Placed This Encounter      Room Service      Regular Diet Adult                  Follow-up Appointments     Follow-up and recommended labs and tests        Follow up with primary care provider, Dr Khan, within 6 wks, for hospital follow- up. No follow up labs or test are needed.                  Further instructions from your care team       Postpartum Vaginal Delivery Instructions  Lactation  [phone]    Activity       Ask family and friends for help when you need it.    Do not place anything in your vagina for 6 weeks.    You are not restricted on other activities, but take it easy for a few weeks to allow your body to recover from delivery.  You are able to do any activities you feel up to that point.    No driving until you have stopped taking your pain medications (usually two weeks after delivery).     Call your health care provider if you have any of these symptoms:       Increased pain, swelling, redness, or fluid around your stiches from an episiotomy or perineal tear.    A fever above 100.4 F (38 C) with or without chills when placing a thermometer under your tongue.    You soak a sanitary pad with blood within 1 hour, or you see blood clots larger than a golf ball.    Bleeding that lasts more than 6 weeks.    Vaginal discharge that smells bad.    Severe pain, cramping or tenderness in your lower belly area.    A need to urinate more frequently (use the toilet more often), more urgently (use the toilet very quickly), or it burns when you urinate.    Nausea and vomiting.    Redness, swelling or pain around a vein in your leg.    Problems breastfeeding or a red or painful area on your breast.    Chest pain and cough or are gasping for air.    Problems coping with sadness, anxiety, or depression.  If you have any concerns about hurting yourself or the baby, call your provider immediately.     You have questions or concerns after you return home.     Keep your hands clean:  Always wash your hands before touching your perineal area and stitches.  This helps reduce your risk of infection.  If your hands aren't dirty, you may use an alcohol hand-rub to clean your hands. Keep your nails clean and short.        Pending Results     No orders found from 7/9/2018 to 7/12/2018.            Statement of Approval     Ordered          07/14/18 1038  I have reviewed and agree with all the recommendations and  "orders detailed in this document.  EFFECTIVE NOW     Approved and electronically signed by:  Jacky Ron MD             Admission Information     Date & Time Provider Department Dept. Phone    7/11/2018 Zari Khan MD [facility] [phone]      Your Vitals Were     Blood Pressure Pulse Temperature Respirations Height Weight    115/64 64 98.1  F (36.7  C) (Oral) 16 1.702 m (5' 7\") 68.9 kg (152 lb)    Last Period BMI (Body Mass Index)                10/13/2017 (Approximate) 23.81 kg/m2          MyChart Information     [employer] gives you secure access to your electronic health record. If you see a primary care provider, you can also send messages to your care team and make appointments. If you have questions, please call your primary care clinic.  If you do not have a primary care provider, please call [phone] and they will assist you.        Care EveryWhere ID     This is your Care EveryWhere ID. This could be used by other organizations to access your [place] medical records  [id number]        Equal Access to Services     [facility] : Hadii martell Banks, waaxda luqadaha, qaybta kaalmajerry gerard, pooja villafuerte . So [facility] [phone].    ATENCIÓN: Si habla español, tiene a ortiz disposición servicios gratuitos de asistencia lingüística. Llame al [phone].    We comply with applicable federal civil rights laws and [place] laws. We do not discriminate on the basis of race, color, national origin, age, disability, sex, sexual orientation, or gender identity.               Review of your medicines      START taking        Dose / Directions    acetaminophen 325 MG tablet   Commonly known as:  TYLENOL        Dose:  650 mg   Take 2 tablets (650 mg) by mouth every 4 hours as needed for mild pain or fever (greater than or equal to 38? C /100.4? F (oral) or 38.5? C/ 101.4? F (core).)   Quantity:  100 tablet   Refills:  0       ibuprofen 600 MG tablet "   Commonly known as:  ADVIL/MOTRIN        Dose:  600 mg   Take 1 tablet (600 mg) by mouth every 6 hours as needed for other (cramping)   Quantity:  120 tablet   Refills:  0         CONTINUE these medicines which have NOT CHANGED        Dose / Directions    prenatal multivitamin plus iron 27-0.8 MG Tabs per tablet        Dose:  1 tablet   Take 1 tablet by mouth daily   Quantity:  100 tablet   Refills:  3       ZANTAC PO        Dose:  150 mg   Take 150 mg by mouth   Refills:  0            Where to get your medicines      Some of these will need a paper prescription and others can be bought over the counter. Ask your nurse if you have questions.     You don't need a prescription for these medications     acetaminophen 325 MG tablet    ibuprofen 600 MG tablet                Protect others around you: Learn how to safely use, store and throw away your medicines at www.disposemymeds.org.             Medication List: This is a list of all your medications and when to take them. Check marks below indicate your daily home schedule. Keep this list as a reference.      Medications           Morning Afternoon Evening Bedtime As Needed    acetaminophen 325 MG tablet   Commonly known as:  TYLENOL   Take 2 tablets (650 mg) by mouth every 4 hours as needed for mild pain or fever (greater than or equal to 38? C /100.4? F (oral) or 38.5? C/ 101.4? F (core).)                                ibuprofen 600 MG tablet   Commonly known as:  ADVIL/MOTRIN   Take 1 tablet (600 mg) by mouth every 6 hours as needed for other (cramping)   Last time this was given:  600 mg on 7/14/2018  7:32 AM                                prenatal multivitamin plus iron 27-0.8 MG Tabs per tablet   Take 1 tablet by mouth daily                                ZANTAC PO   Take 150 mg by mouth

## 2018-07-11 NOTE — IP AVS SNAPSHOT
[facility]    [address]    [facility] [address]    Phone:  [phone]    Fax:  [phone]                                       After Visit Summary   7/11/2018    Blas Perkins    MRN: [id number]           After Visit Summary Signature Page     I have received my discharge instructions, and my questions have been answered. I have discussed any challenges I see with this plan with the nurse or doctor.    ..........................................................................................................................................  Patient/Patient Representative Signature      ..........................................................................................................................................  Patient Representative Print Name and Relationship to Patient    ..................................................               ................................................  Date                                            Time    ..........................................................................................................................................  Reviewed by Signature/Title    ...................................................              ..............................................  Date                                                            Time

## 2018-07-12 ENCOUNTER — ANESTHESIA (OUTPATIENT)
Dept: OBGYN | Facility: CLINIC | Age: 38
End: 2018-07-12
Payer: COMMERCIAL

## 2018-07-12 ENCOUNTER — ANESTHESIA EVENT (OUTPATIENT)
Dept: OBGYN | Facility: CLINIC | Age: 38
End: 2018-07-12
Payer: COMMERCIAL

## 2018-07-12 LAB
ERYTHROCYTE [DISTWIDTH] IN BLOOD BY AUTOMATED COUNT: 13.1 % (ref 10–15)
HCT VFR BLD AUTO: 36 % (ref 35–47)
HGB BLD-MCNC: 12.2 G/DL (ref 11.7–15.7)
MCH RBC QN AUTO: 32.4 PG (ref 26.5–33)
MCHC RBC AUTO-ENTMCNC: 33.9 G/DL (ref 31.5–36.5)
MCV RBC AUTO: 96 FL (ref 78–100)
PLATELET # BLD AUTO: 227 10E9/L (ref 150–450)
RBC # BLD AUTO: 3.76 10E12/L (ref 3.8–5.2)
WBC # BLD AUTO: 10.3 10E9/L (ref 4–11)

## 2018-07-12 PROCEDURE — 25000128 H RX IP 250 OP 636: Performed by: FAMILY MEDICINE

## 2018-07-12 PROCEDURE — 85027 COMPLETE CBC AUTOMATED: CPT | Performed by: OBSTETRICS & GYNECOLOGY

## 2018-07-12 PROCEDURE — 25000132 ZZH RX MED GY IP 250 OP 250 PS 637: Performed by: OBSTETRICS & GYNECOLOGY

## 2018-07-12 PROCEDURE — 25000132 ZZH RX MED GY IP 250 OP 250 PS 637: Performed by: FAMILY MEDICINE

## 2018-07-12 PROCEDURE — 40000671 ZZH STATISTIC ANESTHESIA CASE

## 2018-07-12 PROCEDURE — 3E0R3BZ INTRODUCTION OF ANESTHETIC AGENT INTO SPINAL CANAL, PERCUTANEOUS APPROACH: ICD-10-PCS | Performed by: ANESTHESIOLOGY

## 2018-07-12 PROCEDURE — 00HU33Z INSERTION OF INFUSION DEVICE INTO SPINAL CANAL, PERCUTANEOUS APPROACH: ICD-10-PCS | Performed by: ANESTHESIOLOGY

## 2018-07-12 PROCEDURE — 59410 OBSTETRICAL CARE: CPT | Performed by: OBSTETRICS & GYNECOLOGY

## 2018-07-12 PROCEDURE — 0HQ9XZZ REPAIR PERINEUM SKIN, EXTERNAL APPROACH: ICD-10-PCS | Performed by: OBSTETRICS & GYNECOLOGY

## 2018-07-12 PROCEDURE — 25000125 ZZHC RX 250: Performed by: OBSTETRICS & GYNECOLOGY

## 2018-07-12 PROCEDURE — 25000128 H RX IP 250 OP 636: Performed by: OBSTETRICS & GYNECOLOGY

## 2018-07-12 PROCEDURE — 72200001 ZZH LABOR CARE VAGINAL DELIVERY SINGLE

## 2018-07-12 PROCEDURE — 37000011 ZZH ANESTHESIA WARD SERVICE

## 2018-07-12 PROCEDURE — 12000029 ZZH R&B OB INTERMEDIATE

## 2018-07-12 PROCEDURE — 36415 COLL VENOUS BLD VENIPUNCTURE: CPT | Performed by: OBSTETRICS & GYNECOLOGY

## 2018-07-12 RX ORDER — OXYTOCIN/0.9 % SODIUM CHLORIDE 30/500 ML
1-24 PLASTIC BAG, INJECTION (ML) INTRAVENOUS CONTINUOUS
Status: DISCONTINUED | OUTPATIENT
Start: 2018-07-12 | End: 2018-07-12

## 2018-07-12 RX ORDER — MISOPROSTOL 200 UG/1
800 TABLET ORAL
Status: DISCONTINUED | OUTPATIENT
Start: 2018-07-12 | End: 2018-07-14 | Stop reason: HOSPADM

## 2018-07-12 RX ORDER — NALOXONE HYDROCHLORIDE 0.4 MG/ML
.1-.4 INJECTION, SOLUTION INTRAMUSCULAR; INTRAVENOUS; SUBCUTANEOUS
Status: DISCONTINUED | OUTPATIENT
Start: 2018-07-12 | End: 2018-07-12 | Stop reason: HOSPADM

## 2018-07-12 RX ORDER — NALOXONE HYDROCHLORIDE 0.4 MG/ML
.1-.4 INJECTION, SOLUTION INTRAMUSCULAR; INTRAVENOUS; SUBCUTANEOUS
Status: DISCONTINUED | OUTPATIENT
Start: 2018-07-12 | End: 2018-07-12

## 2018-07-12 RX ORDER — ACETAMINOPHEN 325 MG/1
650 TABLET ORAL EVERY 4 HOURS PRN
Status: DISCONTINUED | OUTPATIENT
Start: 2018-07-12 | End: 2018-07-12

## 2018-07-12 RX ORDER — HYDROCORTISONE 2.5 %
CREAM (GRAM) TOPICAL 3 TIMES DAILY PRN
Status: DISCONTINUED | OUTPATIENT
Start: 2018-07-12 | End: 2018-07-14 | Stop reason: HOSPADM

## 2018-07-12 RX ORDER — BISACODYL 10 MG
10 SUPPOSITORY, RECTAL RECTAL DAILY PRN
Status: DISCONTINUED | OUTPATIENT
Start: 2018-07-14 | End: 2018-07-14 | Stop reason: HOSPADM

## 2018-07-12 RX ORDER — CARBOPROST TROMETHAMINE 250 UG/ML
250 INJECTION, SOLUTION INTRAMUSCULAR
Status: DISCONTINUED | OUTPATIENT
Start: 2018-07-12 | End: 2018-07-13 | Stop reason: CLARIF

## 2018-07-12 RX ORDER — ONDANSETRON 2 MG/ML
4 INJECTION INTRAMUSCULAR; INTRAVENOUS EVERY 6 HOURS PRN
Status: DISCONTINUED | OUTPATIENT
Start: 2018-07-12 | End: 2018-07-13 | Stop reason: CLARIF

## 2018-07-12 RX ORDER — AMOXICILLIN 250 MG
1 CAPSULE ORAL 2 TIMES DAILY
Status: DISCONTINUED | OUTPATIENT
Start: 2018-07-12 | End: 2018-07-14 | Stop reason: HOSPADM

## 2018-07-12 RX ORDER — NALOXONE HYDROCHLORIDE 0.4 MG/ML
.1-.4 INJECTION, SOLUTION INTRAMUSCULAR; INTRAVENOUS; SUBCUTANEOUS
Status: DISCONTINUED | OUTPATIENT
Start: 2018-07-12 | End: 2018-07-14 | Stop reason: HOSPADM

## 2018-07-12 RX ORDER — IBUPROFEN 800 MG/1
800 TABLET, FILM COATED ORAL
Status: COMPLETED | OUTPATIENT
Start: 2018-07-12 | End: 2018-07-12

## 2018-07-12 RX ORDER — OXYCODONE AND ACETAMINOPHEN 5; 325 MG/1; MG/1
1 TABLET ORAL
Status: DISCONTINUED | OUTPATIENT
Start: 2018-07-12 | End: 2018-07-13 | Stop reason: CLARIF

## 2018-07-12 RX ORDER — OXYTOCIN/0.9 % SODIUM CHLORIDE 30/500 ML
100-340 PLASTIC BAG, INJECTION (ML) INTRAVENOUS CONTINUOUS PRN
Status: COMPLETED | OUTPATIENT
Start: 2018-07-12 | End: 2018-07-12

## 2018-07-12 RX ORDER — NALBUPHINE HYDROCHLORIDE 10 MG/ML
2.5-5 INJECTION, SOLUTION INTRAMUSCULAR; INTRAVENOUS; SUBCUTANEOUS EVERY 6 HOURS PRN
Status: DISCONTINUED | OUTPATIENT
Start: 2018-07-12 | End: 2018-07-12 | Stop reason: HOSPADM

## 2018-07-12 RX ORDER — IBUPROFEN 600 MG/1
600 TABLET, FILM COATED ORAL EVERY 6 HOURS PRN
Status: DISCONTINUED | OUTPATIENT
Start: 2018-07-12 | End: 2018-07-14 | Stop reason: HOSPADM

## 2018-07-12 RX ORDER — METHYLERGONOVINE MALEATE 0.2 MG/ML
200 INJECTION INTRAVENOUS
Status: DISCONTINUED | OUTPATIENT
Start: 2018-07-12 | End: 2018-07-13 | Stop reason: CLARIF

## 2018-07-12 RX ORDER — OXYTOCIN 10 [USP'U]/ML
10 INJECTION, SOLUTION INTRAMUSCULAR; INTRAVENOUS
Status: DISCONTINUED | OUTPATIENT
Start: 2018-07-12 | End: 2018-07-14 | Stop reason: HOSPADM

## 2018-07-12 RX ORDER — OXYTOCIN/0.9 % SODIUM CHLORIDE 30/500 ML
100 PLASTIC BAG, INJECTION (ML) INTRAVENOUS CONTINUOUS
Status: DISCONTINUED | OUTPATIENT
Start: 2018-07-12 | End: 2018-07-14 | Stop reason: HOSPADM

## 2018-07-12 RX ORDER — OXYTOCIN 10 [USP'U]/ML
10 INJECTION, SOLUTION INTRAMUSCULAR; INTRAVENOUS
Status: DISCONTINUED | OUTPATIENT
Start: 2018-07-12 | End: 2018-07-13 | Stop reason: CLARIF

## 2018-07-12 RX ORDER — LANOLIN 100 %
OINTMENT (GRAM) TOPICAL
Status: DISCONTINUED | OUTPATIENT
Start: 2018-07-12 | End: 2018-07-14 | Stop reason: HOSPADM

## 2018-07-12 RX ORDER — SODIUM CHLORIDE, SODIUM LACTATE, POTASSIUM CHLORIDE, CALCIUM CHLORIDE 600; 310; 30; 20 MG/100ML; MG/100ML; MG/100ML; MG/100ML
INJECTION, SOLUTION INTRAVENOUS CONTINUOUS
Status: DISCONTINUED | OUTPATIENT
Start: 2018-07-12 | End: 2018-07-13 | Stop reason: CLARIF

## 2018-07-12 RX ORDER — AMOXICILLIN 250 MG
2 CAPSULE ORAL 2 TIMES DAILY
Status: DISCONTINUED | OUTPATIENT
Start: 2018-07-12 | End: 2018-07-14 | Stop reason: HOSPADM

## 2018-07-12 RX ORDER — ACETAMINOPHEN 325 MG/1
650 TABLET ORAL EVERY 4 HOURS PRN
Status: DISCONTINUED | OUTPATIENT
Start: 2018-07-12 | End: 2018-07-14 | Stop reason: HOSPADM

## 2018-07-12 RX ORDER — EPHEDRINE SULFATE 50 MG/ML
5 INJECTION, SOLUTION INTRAMUSCULAR; INTRAVENOUS; SUBCUTANEOUS
Status: DISCONTINUED | OUTPATIENT
Start: 2018-07-12 | End: 2018-07-12 | Stop reason: HOSPADM

## 2018-07-12 RX ORDER — OXYTOCIN/0.9 % SODIUM CHLORIDE 30/500 ML
340 PLASTIC BAG, INJECTION (ML) INTRAVENOUS CONTINUOUS PRN
Status: DISCONTINUED | OUTPATIENT
Start: 2018-07-12 | End: 2018-07-14 | Stop reason: HOSPADM

## 2018-07-12 RX ADMIN — IBUPROFEN 800 MG: 800 TABLET ORAL at 15:01

## 2018-07-12 RX ADMIN — FENTANYL CITRATE 100 MCG: 50 INJECTION, SOLUTION INTRAMUSCULAR; INTRAVENOUS at 12:29

## 2018-07-12 RX ADMIN — Medication 25 MCG: at 01:07

## 2018-07-12 RX ADMIN — OXYTOCIN-SODIUM CHLORIDE 0.9% IV SOLN 30 UNIT/500ML 340 ML/HR: 30-0.9/5 SOLUTION at 14:03

## 2018-07-12 RX ADMIN — SENNOSIDES AND DOCUSATE SODIUM 1 TABLET: 8.6; 5 TABLET ORAL at 21:24

## 2018-07-12 RX ADMIN — SODIUM CHLORIDE, POTASSIUM CHLORIDE, SODIUM LACTATE AND CALCIUM CHLORIDE: 600; 310; 30; 20 INJECTION, SOLUTION INTRAVENOUS at 11:20

## 2018-07-12 RX ADMIN — Medication 25 MCG: at 03:42

## 2018-07-12 RX ADMIN — SODIUM CHLORIDE, POTASSIUM CHLORIDE, SODIUM LACTATE AND CALCIUM CHLORIDE 500 ML: 600; 310; 30; 20 INJECTION, SOLUTION INTRAVENOUS at 06:05

## 2018-07-12 RX ADMIN — OXYTOCIN-SODIUM CHLORIDE 0.9% IV SOLN 30 UNIT/500ML 2 MILLI-UNITS/MIN: 30-0.9/5 SOLUTION at 09:37

## 2018-07-12 RX ADMIN — IBUPROFEN 600 MG: 600 TABLET ORAL at 21:25

## 2018-07-12 ASSESSMENT — ENCOUNTER SYMPTOMS: SEIZURES: 0

## 2018-07-12 NOTE — PROVIDER NOTIFICATION
07/12/18 1355   Provider Notification   Provider Name/Title Dr. Khan   Method of Notification At Bedside   Notification Reason SVE     Dr. Khan at bedside, updated of SVE, pt involuntarily pushing, Dr stayed to attend delivery.

## 2018-07-12 NOTE — ANESTHESIA PREPROCEDURE EVALUATION
PAC NOTE:       ANESTHESIA PRE EVALUATION:  Anesthesia Evaluation       history and physical reviewed .      No history of anesthetic complications          ROS/MED HX    ENT/Pulmonary:  - neg pulmonary ROS     Neurologic: Comment: S/P meningioma resection     (-) seizures   Cardiovascular:  - neg cardiovascular ROS       METS/Exercise Tolerance:     Hematologic:         Musculoskeletal:         GI/Hepatic:     (+) GERD       Renal/Genitourinary:         Endo:         Psychiatric:         Infectious Disease:         Malignancy:         Other:                     Physical Exam  Normal systems: cardiovascular and pulmonary    Airway   Mallampati: II  TM distance: > 3 FB  Neck ROM: full  Mouth opening: > 3 cm    Dental     Cardiovascular       Pulmonary     Other findings: Lab Test        07/12/18 07/11/18 03/27/18 12/21/17      --          09/24/14                       0922          2053          1444          1429           --           0559          WBC          10.3          --          9.7          8.5            < >         --           HGB          12.2         11.8         11.0*        11.9           < >         --           MCV          96            --          99           97             < >         --           PLT          227           --          249          256            < >         --           INR           --           --           --           --           --          1.00           < > = values in this interval not displayed.                  Lab Test        08/30/16     12/01/15     09/26/14     09/25/14     09/25/14                       1147          1216          0415          1257          0256          NA            --          140          140           --          145*          POTASSIUM     --          3.9          3.8          3.7          3.5           CHLORIDE      --          106          110*          --          112*          CO2           --          24 24             --          25            BUN           --          11           4*            --          9             CR           0.68         0.60         0.53          --          0.54          ANIONGAP      --          10           6             --          8             STEFAN           --          8.9          7.5*          --          7.3*          GLC           --          88           101*          --          120*            neg OB ROS            Anesthesia Plan      History & Physical Review      ASA Status:  .  OB Epidural Asa: 2            Postoperative Care      Consents  Anesthetic plan, risks, benefits and alternatives discussed with:  Patient..                            .

## 2018-07-12 NOTE — PLAN OF CARE
Data: Blas Perkins transferred to 446 via wheelchair at 1630. Baby transferred via parent's arms.  Action: Receiving unit notified of transfer: Yes. Patient and family notified of room change. Report given to Roz SAMPSON RN at 1645. Belongings sent to receiving unit. Accompanied by Registered Nurse. Oriented patient to surroundings. Call light within reach. ID bands double-checked with receiving RN.  Response: Patient tolerated transfer and is stable.

## 2018-07-12 NOTE — ANESTHESIA PROCEDURE NOTES
Peripheral nerve/Neuraxial procedure note : epidural catheter  Pre-Procedure  Performed by PREMA CELAYA  Referred by JENN  Location: OB      Pre-Anesthestic Checklist: patient identified, IV checked, site marked, risks and benefits discussed, informed consent, monitors and equipment checked, pre-op evaluation, at physician/surgeon's request and post-op pain management    Timeout  Correct Patient: Yes   Correct Procedure: Yes   Correct Site: Yes   Correct Laterality: Yes   Correct Position: Yes   Site Marked: Yes   .   Procedure Documentation    .    Procedure:    Epidural catheter.     .  .       Assessment/Narrative  .  .  . Comments:  Patient desires Labor Epidural for labor analgesia. Vaginal delivery anticipated.    Chart reviewed. Patient examined. No changes to pre procedure chart review. Risks including but not limited to bleeding, infection, nerve injury, PDPH, intrathecal injection, high block, incomplete block, one-sided block, back pain, and low blood pressure discussed in detail. Questions answered. Consent signed.    Pause for the Cause completed. NIBP and pulse ox functioning. L&D nurse present.    Procedure: Sitting. Betadine prep x 3. Sterile drape applied.  Lidocaine 1% x 2 cc local infiltration at L 3-4.  17 G. Tu needle ML JERICHO 1 attempt.  No CSF, paresthesia or blood. 20 g. Epidural catheter inserted w/o resistance 5 cm.  Negative aspiration for CSF and blood. Filter in line.  Test dose Lidocaine 1.5% w/ 1:200,000 epi x 3 cc injected. Negative for neuro change or symptoms of intravascular injection.  Bolus dose: Marcaine 0.25% 5cc x 2 doses (10 cc total) + Fentanyl 100 ugm.  Infusion orders written.    I or my partner am immediately available. I or my partner will monitor the patient and supervise nursing care at necessary intervals.    Marilu

## 2018-07-12 NOTE — PROVIDER NOTIFICATION
07/12/18 1306   Provider Notification   Provider Name/Title Dr. Khan   Method of Notification In Department   Notification Reason Status Update;SVE     Updated of pt feeling pressure and more uncomfortable, SVE 4/30/-1, pt is deciding if wanting to go forward with epidural. Pt did receive one dose of fentanyl.  Intermittent variables noted, along with intermittent subtle early decel's.

## 2018-07-12 NOTE — PROVIDER NOTIFICATION
07/11/18 2015   Provider Notification   Provider Name/Title Dr. Kimbrough   Method of Notification Phone   Request Evaluate - Remote   Notification Reason Patient Arrived;Uterine Activity;SVE;Other (Comment)   Notified MD of pt arrival for scheduled cervical ripening in preparation for AM induction of labor d/t postdates, SVE, Nielsen, contractions q 6-7 but pt unaware, plan for PO cytotec.  Orders received to proceed with PO cytotec, ambien PRN for sleep, fentanyl PRN for pain.

## 2018-07-12 NOTE — L&D DELIVERY NOTE
VAGINAL DELIVERY PROCEDURE NOTE    PREOPERATIVE DIAGNOSIS:  1. Intrauterine pregnancy at 41w2d  2. Active labor    POSTOPERATIVE DIAGNOSIS:   1. Status post vaginal delivery  2. First degree perineal laceration with right labial laceration    PROCEDURE DATE: 2018    PROCEDURE:   1.   2. Laceration repair      STAFF SURGEON: Zari Khan MD    ANESTHESIA: epidural    COMPLICATIONS: none    QBL: 100 cc.     SPECIMENS: cord blood    FINDINGS:  Male infant  Apgar scores of 8 and 9.  Delivered in OA presentation. Placenta with 3 vessel cord. Meconium: none.      INDICATIONS:  This is a 37 year old  with intrauterine pregnancy at 41w2d who presented to Labor and Delivery for IOL for postdates. Her pregnancy has been complicated by AMA. She has a history of meningioma followed by Dr. Quintana at [facility] in [place].     PROCEDURE:  The patient was complete and pushing. Infant's head was delivered atraumatically over perineum in the OA position. Nuchal cord times one, reduced on the perineum. Anterior shoulder and posterior shoulders were easily delivered without problems followed by remainder of infant. Infant vigorous and crying. Drying and resuscitative measures performed. Delayed cord clamping was performed prior to cutting. Cord gases were not obtained. Cord blood was collected. Pitocin in IV fluid was administered per protocol. The placenta delivered spontaneously intact with 3 vessel cord and revealed normal anatomy. Uterine massage was performed and the fundus was found to be firm. Vagina, cervix, and perineum were inspected for lacerations. First laceration was noted and repaired with 3.0 vicryl rapide, and the labial laceration with 3.0 vicryl. All counts were correct. Mom and baby stable in room.    Primary OB: Dr. Parag Khan MD  5:21 PM  2018

## 2018-07-12 NOTE — PLAN OF CARE
"Pt has had support person, significant other/fob, present this shift, fob has made comments towards pt such as \"how long is this going to take, I could have stayed home last night and slept.\"  Pt and fob have made sarcastic demeaning comments towards each other through out shift. At delivery fob made statement \"i don't even know why I am here, I don't know why she has me here, i'm not a good person\" pt stated \"he's right he is not a good person.\"  Writer encouraged both the pt and fob that they were a good team during labor. Positive bonding behaviors observed between fob/pt and , fob was actively supportive during breastfeeding while instructed by writer how to be supportive.    "

## 2018-07-12 NOTE — PROVIDER NOTIFICATION
07/12/18 0935   Provider Notification   Provider Name/Title Dr. Khan   Method of Notification At Bedside     Updated of cbc w/plat results, brown blood when wiping after using br.  Dr. Khan discussed poc, pt agrees with poc.

## 2018-07-12 NOTE — PROVIDER NOTIFICATION
07/12/18 0857   Provider Notification   Provider Name/Title Dr. Khan   Method of Notification Phone     Dr. Khan updated of pt hx, last sve 1.5/70/-2, contraction frequency, rider score given by prior clinician of 6 @0600.  Received order for pitocin and CBC.  Dr. Khan in facility. Pt agrees with POC.

## 2018-07-12 NOTE — H&P
[facility]    History and Physical  Obstetrics and Gynecology     Date of Admission:  2018    Assessment & Plan   Blas Perkins is a 37 year old female who presents for IOL for postdates at 41w2d     ASSESSMENT:   IUP @ 41w2d for induction of labor.  Indication postdates.  Meningioma, s/p resection with residual tumor followed yearly.  History ETOH abuse, sober for 6 years  History macrosomia with normal growth scan this pregnancy at 39 weeks (21%tile)  AMA: normal noninvasive prenatal testing and level II  History of thrombocytopenia: related just to surgery, platelets normal otherwise.    PLAN:   -Admit - see IP orders  -Cervical ripening was accomplished with cytotec overnight last night. Nielsen score was 6 this morning, will start pitocin infusion now.  -Discussed options for pain relief, and she is most interested in IV meds only, potentially nitrous, but is open to all options.  -Checked CBC: platelets normal.  -Notified Yulissa Lerner CNP who works with Dr. Quintana of the patient's admission. She has not seen neuro/neurosurgery in this pregnancy, so I'd like their opinion on any restrictions for pushing or epidural/spinal.    Zari Khan MD    History of Present Illness   Blas Perkins is a 37 year old female  41w2d  Estimated Date of Delivery: 7/3/18 is calculated from Patient's last menstrual period was 10/13/2017 (approximate). [facility] of [facility].  Indication postdates.    PRENATAL COURSE  Prenatal course was essentially uncomplicated other than the medical and surgical history as noted above.    Recent Labs   Lab Test  18   2053  17   1428   ABO  A  A   RH  Pos  Pos   AS   --   Neg     Rhogam not indicated   Recent Labs   Lab Test  18   1440  17   1429   HEPBANG   --   Nonreactive   HIAGAB   --   Nonreactive   GBS  Negative   --    RUQIGG   --   25       Past Medical History    I have reviewed this  patient's medical history and updated it with pertinent information if needed.   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 s/p cyerknife times 2 2016 for residual, follows regularly with Dr. Quintana at [facility].     Thrombocytopenia (H) 9/30/2014     Thrombocytopenia (H) 9/30/2014     TMJ (temporomandibular joint syndrome) 1/23/2012     Tobacco abuse        Past Surgical History   I have reviewed this patient's surgical history and updated it with pertinent information if needed.  Past Surgical History:   Procedure Laterality Date     BIOPSY  unsure    lump removed from leg     C LIGATE FALLOPIAN TUBE  2000    reversal done     C NONSPECIFIC PROCEDURE  1979    tubes in ears     C NONSPECIFIC PROCEDURE  10/08    Wide excision of left thigh melanoma and left inguinal sentinel node biopsy.      OPTICAL TRACKING SYSTEM CRANIOTOMY, EXCISE TUMOR, COMBINED Right 9/24/2014    Procedure: COMBINED OPTICAL TRACKING SYSTEM CRANIOTOMY, EXCISE TUMOR;  Surgeon: Austin Mccallum MD;  Location:  OR     OPTICAL TRACKING SYSTEM ENDOSCOPIC ENDONASAL SURGERY Right 7/7/2017    Procedure: OPTICAL TRACKING SYSTEM ENDOSCOPIC ENDONASAL SURGERY;  Right Orbital Decompression with optical tracking;  Surgeon: Blake Medina MD;  Location:  OR     OPTICAL TRACKING SYSTEM ORBITOTOMY Right 7/7/2017    Procedure: OPTICAL TRACKING SYSTEM ORBITOTOMY;  Medial Wall decompression;  Surgeon: Jorge Luis Desir MD;  Location:  OR       Prior to Admission Medications   Prior to Admission Medications   Prescriptions Last Dose Informant Patient Reported? Taking?   Prenatal Vit-Fe  "Fumarate-FA (PRENATAL MULTIVITAMIN PLUS IRON) 27-0.8 MG TABS per tablet 7/11/2018 at Unknown time  Yes Yes   Sig: Take 1 tablet by mouth daily   RaNITidine HCl (ZANTAC PO) 7/10/2018 at Unknown time  Yes Yes   Sig: Take 150 mg by mouth      Facility-Administered Medications: None     Allergies   Allergies   Allergen Reactions     Chantix [Varenicline]      Increased \"crabbiness\"       Social History   I have reviewed this patient's social history and updated it with pertinent information if needed. Blas Perkins  reports that she has been smoking Cigarettes.  She started smoking about 20 years ago. She has a 2.50 pack-year smoking history. She has never used smokeless tobacco. She reports that she does not drink alcohol or use illicit drugs.    Family History   I have reviewed this patient's family history and updated it with pertinent information if needed.   Family History   Problem Relation Age of Onset     Family History Negative Mother      Substance Abuse Mother      Family History Negative Father      Family History Negative Sister      Family History Negative Brother      Diabetes Maternal Grandfather      Substance Abuse Maternal Grandfather      Substance Abuse Maternal Grandmother      Unknown/Adopted Paternal Grandmother      Unknown/Adopted Paternal Grandfather      Glaucoma No family hx of      Macular Degeneration No family hx of        Immunization History   Immunizations are up to date    Physical Exam   Temp: 98.5  F (36.9  C) Temp src: Oral BP: 109/62   Heart Rate: 70 Resp: 16        Vital Signs with Ranges  Temp:  [98.5  F (36.9  C)-98.9  F (37.2  C)] 98.5  F (36.9  C)  Heart Rate:  [70] 70  Resp:  [16] 16  BP: (106-120)/(57-70) 109/62    Abdomen: gravid, single vertex fetus, non-tender, EFW 7 lbs 8  Cervical Exam: 1.5/ 70/ Mid/ soft/ -2 per RN--I did not recheck.    Fetal Heart Tones: normal baseline, moderate variablility, + accels, no decels and Category I  TOCO:   frequency q 5-7 " minutes    Constitutional: healthy, alert and active   Respiratory: no increased work of breathing  Cardiovascular: no edema  Skin/Extremites: no bruising or bleeding and normal skin color, texture, turgor  Neurologic: Awake, alert, oriented to name, place and time.  Cranial nerves II-XII are grossly intact.  Motor is 5 out of 5 bilaterally.  Cerebellar finger to nose, heel to shin intact.  Sensory is intact.  Babinski down going, Romberg negative, and gait is normal.  Neuropsychiatric: General: normal, calm and normal eye contact  Level of consciousness: alert / normal  Affect: normal and pleasant

## 2018-07-13 LAB — T PALLIDUM AB SER QL: NONREACTIVE

## 2018-07-13 PROCEDURE — 25000132 ZZH RX MED GY IP 250 OP 250 PS 637: Performed by: OBSTETRICS & GYNECOLOGY

## 2018-07-13 PROCEDURE — 12000029 ZZH R&B OB INTERMEDIATE

## 2018-07-13 RX ADMIN — SENNOSIDES AND DOCUSATE SODIUM 2 TABLET: 8.6; 5 TABLET ORAL at 22:22

## 2018-07-13 RX ADMIN — IBUPROFEN 600 MG: 600 TABLET ORAL at 09:55

## 2018-07-13 RX ADMIN — SENNOSIDES AND DOCUSATE SODIUM 1 TABLET: 8.6; 5 TABLET ORAL at 09:56

## 2018-07-13 RX ADMIN — IBUPROFEN 600 MG: 600 TABLET ORAL at 22:22

## 2018-07-13 RX ADMIN — IBUPROFEN 600 MG: 600 TABLET ORAL at 16:05

## 2018-07-13 NOTE — PLAN OF CARE
Problem: Patient Care Overview  Goal: Plan of Care/Patient Progress Review  Outcome: Improving  Pt meeting expected outcomes. Breastfeeding well. Using Ibuprofen as needed with relief. Walking outside every few hours to smoke with . Working on d/c paperwork.

## 2018-07-13 NOTE — PLAN OF CARE
Problem: Patient Care Overview  Goal: Plan of Care/Patient Progress Review  Outcome: Improving  Patient meeting expected outcomes. Denies pain, no pain medications taken this shift. Breastfeeding fair, needs staff assistance at times. Encouraged to call for assistance PRN. Independent with self cares.

## 2018-07-13 NOTE — PROGRESS NOTES
"Postpartum Progress Note  Blas Perkins  [id number]    Subjective:   Patiet doing well. Pain well controlled with POs. Denies nausea and vomiting. Ambulating without difficulty. Adequate PO intake. Breast feeding going well. Perineal pain with increased activity.    # Pain Assessment:  Current Pain Score 2018   Patient currently in pain? denies   Pain score (0-10) -   Pain location -   Pain descriptors -   Some encounter information is confidential and restricted. Go to Review Flowsheets activity to see all data.   - Blas is experiencing pain due to . Pain management was discussed and the plan was created in a collaborative fashion.  Blas's response to the current recommendations: engaged  - Please see the plan for pain management as documented below       Objective:  /49  Pulse 58  Temp 98.5  F (36.9  C) (Oral)  Resp 16  Ht 1.702 m (5' 7\")  Wt 68.9 kg (152 lb)  LMP 10/13/2017 (Approximate)  Breastfeeding? Unknown  BMI 23.81 kg/m2  General: resting comfortably, in NAD  Heart: regular rate, well perfused  Lungs: non-labored breathing, no cough  Abdomen: soft, non distended, appropriately tender to palpation, FF at U -2  Extremities: scant edema, non-tender to palpation    Labs:  Hemoglobin   Date Value Ref Range Status   2018 12.2 11.7 - 15.7 g/dL Final   2018 11.8 11.7 - 15.7 g/dL Final       Assessment/Plan:  37 year old  who is PPD#1 s/p uncmplicated  following post-dates IOL.  Currently stable and doing well  - Routine post-partum cares  - Heme: stable  - Pain: continue tylenol, ibuprofen, ice packs, hot packs as needed  - Baby: in room doing well  - Dispo: d/c to home PPD#2    Patricia Mensah MD  OB/GYN             "

## 2018-07-13 NOTE — ADDENDUM NOTE
Addendum  created 07/13/18 1402 by Alejandro Estrada MD    Anesthesia Intra Blocks edited, Sign clinical note

## 2018-07-13 NOTE — PROGRESS NOTES
Patient meeting expected goals this shift. Able to do all self cares and cares for . Voiding without difficulty. Ambulating well, no assistance needed. Using ibuprofen for pain. Appropriate conversations between  and patient. Patient states she didn't want all the visitors coming, referring to in laws. Patient seems irritated with in laws. After family left patient was very pleasant. Education taught to patient and patient verbalized understanding.

## 2018-07-13 NOTE — ANESTHESIA POSTPROCEDURE EVALUATION
Patient: Blas Perkins      S/P epidural for labor.   I or my partner was immediately available for management of this patient during epidural analgesia infusion.  VSS.  Doing well. Block resolved.  Neuro at baseline. Denies positional headache. Minimal side effects easily managed w/ PRN meds. No apparent anesthetic complications. No follow-up required.    Jarek Tena MD    Note:  Anesthesia Post Evaluation    Last vitals:  Vitals:    07/12/18 1800 07/12/18 2125 07/13/18 0104   BP: 130/60 119/67 104/49   Pulse: 88 83 58   Resp: 16 16 16   Temp: 98.6  F (37  C)  98.5  F (36.9  C)         Electronically Signed By: Jarek Tena MD  July 13, 2018  8:05 AM

## 2018-07-13 NOTE — PLAN OF CARE
Problem: Patient Care Overview  Goal: Plan of Care/Patient Progress Review  Outcome: Improving  Patient vital signs stable. Pain managed with prn meds. Breastfeeding fair without discomfort. Patient independently caring for self and baby without difficulty.  at bedside.

## 2018-07-14 VITALS
BODY MASS INDEX: 23.86 KG/M2 | HEIGHT: 67 IN | RESPIRATION RATE: 16 BRPM | WEIGHT: 152 LBS | TEMPERATURE: 98.1 F | SYSTOLIC BLOOD PRESSURE: 115 MMHG | DIASTOLIC BLOOD PRESSURE: 64 MMHG | HEART RATE: 64 BPM

## 2018-07-14 PROCEDURE — 25000132 ZZH RX MED GY IP 250 OP 250 PS 637: Performed by: OBSTETRICS & GYNECOLOGY

## 2018-07-14 RX ORDER — ACETAMINOPHEN 325 MG/1
650 TABLET ORAL EVERY 4 HOURS PRN
Qty: 100 TABLET | COMMUNITY
Start: 2018-07-14 | End: 2021-01-06

## 2018-07-14 RX ORDER — IBUPROFEN 600 MG/1
600 TABLET, FILM COATED ORAL EVERY 6 HOURS PRN
Qty: 120 TABLET | COMMUNITY
Start: 2018-07-14 | End: 2021-01-06

## 2018-07-14 RX ADMIN — IBUPROFEN 600 MG: 600 TABLET ORAL at 07:32

## 2018-07-14 RX ADMIN — SENNOSIDES AND DOCUSATE SODIUM 1 TABLET: 8.6; 5 TABLET ORAL at 07:32

## 2018-07-14 NOTE — DISCHARGE INSTRUCTIONS
Postpartum Vaginal Delivery Instructions  Lactation [phone]    Activity       Ask family and friends for help when you need it.    Do not place anything in your vagina for 6 weeks.    You are not restricted on other activities, but take it easy for a few weeks to allow your body to recover from delivery.  You are able to do any activities you feel up to that point.    No driving until you have stopped taking your pain medications (usually two weeks after delivery).     Call your health care provider if you have any of these symptoms:       Increased pain, swelling, redness, or fluid around your stiches from an episiotomy or perineal tear.    A fever above 100.4 F (38 C) with or without chills when placing a thermometer under your tongue.    You soak a sanitary pad with blood within 1 hour, or you see blood clots larger than a golf ball.    Bleeding that lasts more than 6 weeks.    Vaginal discharge that smells bad.    Severe pain, cramping or tenderness in your lower belly area.    A need to urinate more frequently (use the toilet more often), more urgently (use the toilet very quickly), or it burns when you urinate.    Nausea and vomiting.    Redness, swelling or pain around a vein in your leg.    Problems breastfeeding or a red or painful area on your breast.    Chest pain and cough or are gasping for air.    Problems coping with sadness, anxiety, or depression.  If you have any concerns about hurting yourself or the baby, call your provider immediately.     You have questions or concerns after you return home.     Keep your hands clean:  Always wash your hands before touching your perineal area and stitches.  This helps reduce your risk of infection.  If your hands aren't dirty, you may use an alcohol hand-rub to clean your hands. Keep your nails clean and short.

## 2018-07-14 NOTE — PLAN OF CARE
Problem: Patient Care Overview  Goal: Plan of Care/Patient Progress Review  Vitals stable. Pt ambulatory and independent with self cares and infant care. Pain managed well with ibuprofen. Breastfeeding infant well with no assistance from staff. Spouse home for the night. Patient meeting expected goals, anticipate discharge home 07/14/2018.

## 2018-07-14 NOTE — PROGRESS NOTES
[facility] Obstetrics Post-Partum Progress Note          Assessment and Plan:    Assessment:   Post-partum day #2  Induced vaginal delivery secondary to post-dates  L&D complications: None      Doing well.  Clean wound without signs of infection.  Normal healing wound.  No immediate surgical complications identified.  No excessive bleeding  Pain well-controlled.      Plan:   Ambulation encouraged  Breast feeding strategies discussed  Monitor wound for signs of infection  Pain control measures as needed  Reportable signs and symptoms dicussed with the patient  Discharge later today    Take your tempature twice daily for 3 days and call if > 100.4  Nothing in vagina for 6 wks  Continue taking prenatal MVI daily for 1 month             Interval History:   Doing well.  Pain is well-controlled.  No fevers.  No history of foul-smelling vaginal discharge.  Good appetite.  Denies chest pain, shortness of breath, nausea or vomiting.  Vaginal bleeding is similar to a heavy menstrual flow.  Ambulatory.  Breastfeeding well.          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 s/p cyerknife times 2 2016 for residual, follows regularly with Dr. Quintana at [facility].     Thrombocytopenia (H) 9/30/2014     Thrombocytopenia (H) 9/30/2014     TMJ (temporomandibular joint syndrome) 1/23/2012     Tobacco abuse              Review of Systems:    The patient denies any chest pain, shortness of breath, excessive pain, fever, chills, purulent drainage from the wound, nausea or vomiting.          Medications:     All medications related to the patient's surgery  have been reviewed  Current Facility-Administered Medications   Medication     acetaminophen (TYLENOL) tablet 650 mg     bisacodyl (DULCOLAX) Suppository 10 mg     calcium carbonate (TUMS) chewable tablet 1,000 mg     hydrocortisone 2.5 % cream     ibuprofen (ADVIL/MOTRIN) tablet 600 mg     lactated ringers BOLUS 1,000 mL     lanolin ointment     magnesium hydroxide (MILK OF MAGNESIA) suspension 30 mL     misoprostol (CYTOTEC) tablet 800 mcg     naloxone (NARCAN) injection 0.1-0.4 mg     No MMR Needed - Assessment: Patient does not need MMR vaccine     NO Rho (D) immune globulin (RhoGam) needed - mother Rh POSITIVE     oxytocin (PITOCIN) 30 units in 500 mL 0.9% NaCl infusion     oxytocin (PITOCIN) 30 units in 500 mL 0.9% NaCl infusion     oxytocin (PITOCIN) injection 10 Units     senna-docusate (SENOKOT-S;PERICOLACE) 8.6-50 MG per tablet 1 tablet    Or     senna-docusate (SENOKOT-S;PERICOLACE) 8.6-50 MG per tablet 2 tablet             Physical Exam:   Vitals were reviewed  All vitals stable  Temp: 98.1  F (36.7  C) Temp src: Oral BP: 115/64 Pulse: 64   Resp: 16        Uterine fundus is firm, non-tender and at the level of the umbilicus          Data:     All laboratory data related to this surgery reviewed  Hemoglobin   Date Value Ref Range Status   07/12/2018 12.2 11.7 - 15.7 g/dL Final   07/11/2018 11.8 11.7 - 15.7 g/dL Final   03/27/2018 11.0 (L) 11.7 - 15.7 g/dL Final   12/21/2017 11.9 11.7 - 15.7 g/dL Final   06/23/2017 12.9 11.7 - 15.7 g/dL Final     No imaging studies have been ordered    Jacky Ron MD

## 2018-07-14 NOTE — PLAN OF CARE
Problem: Patient Care Overview  Goal: Plan of Care/Patient Progress Review  Outcome: Improving  Pt up ad cara and caring for self and baby independently. Using Ibuprofen as needed with good relief. Breastfeeding very frequently for most of the night.     Discharge instructions reviewed and all questions answered. Pt has breast pump at home and will continue with OTC Tylenol and Ibuprofen. Discharged home with baby at 1200.

## 2018-07-14 NOTE — DISCHARGE SUMMARY
[facility] Obstetrics Post-Partum Progress Note          Assessment and Plan:    Assessment:   Post-partum day #2  Induced vaginal delivery secondary to post-dates  L&D complications: None      Doing well.  Clean wound without signs of infection.  Normal healing wound.  No immediate surgical complications identified.  No excessive bleeding  Pain well-controlled.      Plan:   Ambulation encouraged  Breast feeding strategies discussed  Monitor wound for signs of infection  Pain control measures as needed  Reportable signs and symptoms dicussed with the patient  Discharge later today    Take your tempature twice daily for 3 days and call if > 100.4  Nothing in vagina for 6 wks  Continue taking prenatal MVI daily for 1 month             Interval History:   Doing well.  Pain is well-controlled.  No fevers.  No history of foul-smelling vaginal discharge.  Good appetite.  Denies chest pain, shortness of breath, nausea or vomiting.  Vaginal bleeding is similar to a heavy menstrual flow.  Ambulatory.  Breastfeeding well.          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 s/p cyerknife times 2 2016 for residual, follows regularly with Dr. Quintana at [facility].     Thrombocytopenia (H) 9/30/2014     Thrombocytopenia (H) 9/30/2014     TMJ (temporomandibular joint syndrome) 1/23/2012     Tobacco abuse              Review of Systems:    The patient denies any chest pain, shortness of breath, excessive pain, fever, chills, purulent drainage from the wound, nausea or vomiting.          Medications:     All medications related to the patient's surgery  have been reviewed  Current Facility-Administered Medications   Medication     acetaminophen (TYLENOL) tablet 650 mg     bisacodyl (DULCOLAX) Suppository 10 mg     calcium carbonate (TUMS) chewable tablet 1,000 mg     hydrocortisone 2.5 % cream     ibuprofen (ADVIL/MOTRIN) tablet 600 mg     lactated ringers BOLUS 1,000 mL     lanolin ointment     magnesium hydroxide (MILK OF MAGNESIA) suspension 30 mL     misoprostol (CYTOTEC) tablet 800 mcg     naloxone (NARCAN) injection 0.1-0.4 mg     No MMR Needed - Assessment: Patient does not need MMR vaccine     NO Rho (D) immune globulin (RhoGam) needed - mother Rh POSITIVE     oxytocin (PITOCIN) 30 units in 500 mL 0.9% NaCl infusion     oxytocin (PITOCIN) 30 units in 500 mL 0.9% NaCl infusion     oxytocin (PITOCIN) injection 10 Units     senna-docusate (SENOKOT-S;PERICOLACE) 8.6-50 MG per tablet 1 tablet    Or     senna-docusate (SENOKOT-S;PERICOLACE) 8.6-50 MG per tablet 2 tablet             Physical Exam:   Vitals were reviewed  All vitals stable  Temp: 98.1  F (36.7  C) Temp src: Oral BP: 115/64 Pulse: 64   Resp: 16        Uterine fundus is firm, non-tender and at the level of the umbilicus          Data:     All laboratory data related to this surgery reviewed  Hemoglobin   Date Value Ref Range Status   07/12/2018 12.2 11.7 - 15.7 g/dL Final   07/11/2018 11.8 11.7 - 15.7 g/dL Final   03/27/2018 11.0 (L) 11.7 - 15.7 g/dL Final   12/21/2017 11.9 11.7 - 15.7 g/dL Final   06/23/2017 12.9 11.7 - 15.7 g/dL Final     No imaging studies have been ordered    Jacky Ron MD

## 2018-08-20 ENCOUNTER — PRENATAL OFFICE VISIT (OUTPATIENT)
Dept: OBGYN | Facility: CLINIC | Age: 38
End: 2018-08-20

## 2018-08-20 VITALS — BODY MASS INDEX: 22.26 KG/M2 | SYSTOLIC BLOOD PRESSURE: 120 MMHG | WEIGHT: 142.1 LBS | DIASTOLIC BLOOD PRESSURE: 64 MMHG

## 2018-08-20 PROCEDURE — 99207 ZZC POST PARTUM EXAM: CPT | Performed by: OBSTETRICS & GYNECOLOGY

## 2018-08-20 NOTE — PROGRESS NOTES
SUBJECTIVE: Blas is here for a 6-week postpartum checkup.    Delivery date was 2018. She had a  of a viable boy, weight 6 pounds 1 oz., with no complications.  Since delivery, she has been breast feeding.  She has no signs of infection, bleeding or other complications.  She is not pregnant.  We discussed contraceptions and she has chosen none.  She  has not had intercourse since delivery and complains of No discomfort. Patient screened for postpartum depression and complaints are NEGATIVE. Screening has also been completed for intimate partner violence.  Radha Ramos MA    EXAM:  Today's Depression Rating was   PHQ-9 SCORE 2018   Total Score 0       Pelvic : normal external genitalia, normal groin lymphatics, normal urethral meatus, normal vaginal mucosa, normal cervix, normal adnexa, no masses or tenderness, uterus normal size and shape and uterus antiverted.     ASSESSMENT:   Normal postpartum exam after .    PLAN:  Return as needed or at time of next expected pap, pelvic, or breast exam. Discussed the risks of infection ,and resulting infertility with IUD use. Also discussed perforation of the uterus, need for surgery to retrieve a lost IUD. Also discussed bleeding and cramping associated with Paragard IUD use.

## 2018-08-20 NOTE — NURSING NOTE
"Chief Complaint   Patient presents with     Post Partum Exam   Radha Ramos MA      Initial /64 (BP Location: Right arm, Patient Position: Chair, Cuff Size: Adult Regular)  Wt 142 lb 1.6 oz (64.5 kg)  LMP 10/13/2017 (Approximate)  BMI 22.26 kg/m2 Estimated body mass index is 22.26 kg/(m^2) as calculated from the following:    Height as of 18: 5' 7\" (1.702 m).    Weight as of this encounter: 142 lb 1.6 oz (64.5 kg).  BP completed using cuff size: regular        The following HM Due: NONE      The following patient reported/Care Every where data was sent to:  P ABSTRACT QUALITY INITIATIVES [[id number]]                      "

## 2018-08-20 NOTE — MR AVS SNAPSHOT
After Visit Summary   8/20/2018    Blas Perkins    MRN: [id number]           Patient Information     Date Of Birth          1980        Visit Information        Provider Department      8/20/2018 3:30 PM Brody Tuttle MD [facility]        Today's Diagnoses     Routine postpartum follow-up    -  1       Follow-ups after your visit        Who to contact     If you have questions or need follow up information about today's clinic visit or your schedule please contact [facility] directly at [phone].  Normal or non-critical lab and imaging results will be communicated to you by Petpacehart, letter or phone within 4 business days after the clinic has received the results. If you do not hear from us within 7 days, please contact the clinic through [facility] [facility] or phone. If you have a critical or abnormal lab result, we will notify you by phone as soon as possible.  Submit refill requests through DailyBooth or call your pharmacy and they will forward the refill request to us. Please allow 3 business days for your refill to be completed.          Additional Information About Your Visit        MyChart Information     DailyBooth gives you secure access to your electronic health record. If you see a primary care provider, you can also send messages to your care team and make appointments. If you have questions, please call your primary care clinic.  If you do not have a primary care provider, please call [phone] and they will assist you.        Care EveryWhere ID     This is your Care EveryWhere ID. This could be used by other organizations to access your [facility] medical records  [id number]        Your Vitals Were     Last Period BMI (Body Mass Index)                10/13/2017 (Approximate) 22.26 kg/m2           Blood Pressure from Last 3 Encounters:   08/20/18 120/64   07/14/18 115/64   07/09/18 108/66    Weight from Last 3 Encounters:   08/20/18 142 lb 1.6 oz  (64.5 kg)   07/11/18 152 lb (68.9 kg)   07/09/18 153 lb (69.4 kg)              Today, you had the following     No orders found for display       Primary Care Provider Office Phone # Fax #    Dong Antonio [phone]       [address] [facility] [address]        Equal Access to Services     [facility] : Hadii aad ku hadasho Soomaali, waaxda luqadaha, qaybta kaalmada adeegyada, waxay idiin hayaan adeeg christel lacorinnan ah. So [facility] [phone].    ATENCIÓN: Si habla esphenrique, tiene a ortiz disposición servicios gratuitos de asistencia lingüística. Llame al [phone].    We comply with applicable federal civil rights laws and [place] laws. We do not discriminate on the basis of race, color, national origin, age, disability, sex, sexual orientation, or gender identity.            Thank you!     Thank you for choosing [facility]  for your care. Our goal is always to provide you with excellent care. Hearing back from our patients is one way we can continue to improve our services. Please take a few minutes to complete the written survey that you may receive in the mail after your visit with us. Thank you!             Your Updated Medication List - Protect others around you: Learn how to safely use, store and throw away your medicines at www.disposemymeds.org.          This list is accurate as of 8/20/18  3:59 PM.  Always use your most recent med list.                   Brand Name Dispense Instructions for use Diagnosis    acetaminophen 325 MG tablet    TYLENOL    100 tablet    Take 2 tablets (650 mg) by mouth every 4 hours as needed for mild pain or fever (greater than or equal to 38? C /100.4? F (oral) or 38.5? C/ 101.4? F (core).)    Vaginal delivery       ibuprofen 600 MG tablet    ADVIL/MOTRIN    120 tablet    Take 1 tablet (600 mg) by mouth every 6 hours as needed for other (cramping)    Vaginal delivery       prenatal multivitamin plus iron 27-0.8 MG Tabs per tablet     100  tablet    Take 1 tablet by mouth daily        ZANTAC PO      Take 150 mg by mouth

## 2018-08-21 ASSESSMENT — PATIENT HEALTH QUESTIONNAIRE - PHQ9: SUM OF ALL RESPONSES TO PHQ QUESTIONS 1-9: 0

## 2019-01-10 ENCOUNTER — ALLIED HEALTH/NURSE VISIT (OUTPATIENT)
Dept: NURSING | Facility: CLINIC | Age: 39
End: 2019-01-10
Payer: COMMERCIAL

## 2019-01-10 DIAGNOSIS — Z23 NEED FOR PROPHYLACTIC VACCINATION AND INOCULATION AGAINST INFLUENZA: ICD-10-CM

## 2019-01-10 DIAGNOSIS — Z23 ENCOUNTER FOR IMMUNIZATION: Primary | ICD-10-CM

## 2019-01-10 PROCEDURE — 90686 IIV4 VACC NO PRSV 0.5 ML IM: CPT

## 2019-01-10 PROCEDURE — 90471 IMMUNIZATION ADMIN: CPT

## 2019-01-10 NOTE — PROGRESS NOTES

## 2019-06-26 NOTE — NURSING NOTE
NPN nurse visit. 1st dr visit scheduled for 1/2/18 with Brody Tuttle M.D. Pap due. Last pap unsure.  10w0d    FROY Huerta RN    
right normal/left normal

## 2019-09-30 ENCOUNTER — HEALTH MAINTENANCE LETTER (OUTPATIENT)
Age: 39
End: 2019-09-30

## 2020-07-02 ENCOUNTER — OFFICE VISIT (OUTPATIENT)
Dept: URGENT CARE | Facility: URGENT CARE | Age: 40
End: 2020-07-02
Payer: COMMERCIAL

## 2020-07-02 VITALS — OXYGEN SATURATION: 100 % | TEMPERATURE: 97.7 F | BODY MASS INDEX: 23.31 KG/M2 | HEART RATE: 64 BPM | WEIGHT: 148.8 LBS

## 2020-07-02 DIAGNOSIS — M62.830 BACK MUSCLE SPASM: ICD-10-CM

## 2020-07-02 DIAGNOSIS — M54.50 ACUTE MIDLINE LOW BACK PAIN WITHOUT SCIATICA: Primary | ICD-10-CM

## 2020-07-02 PROCEDURE — 99204 OFFICE O/P NEW MOD 45 MIN: CPT | Performed by: PHYSICIAN ASSISTANT

## 2020-07-02 RX ORDER — METHOCARBAMOL 750 MG/1
750 TABLET, FILM COATED ORAL 4 TIMES DAILY PRN
Qty: 21 TABLET | Refills: 0 | Status: SHIPPED | OUTPATIENT
Start: 2020-07-02 | End: 2021-01-06

## 2020-07-02 RX ORDER — METHYLPREDNISOLONE 4 MG
TABLET, DOSE PACK ORAL
Qty: 21 TABLET | Refills: 0 | Status: SHIPPED | OUTPATIENT
Start: 2020-07-02 | End: 2021-01-06

## 2020-07-02 RX ORDER — HYDROCODONE BITARTRATE AND ACETAMINOPHEN 5; 325 MG/1; MG/1
1 TABLET ORAL EVERY 6 HOURS PRN
Qty: 10 TABLET | Refills: 0 | Status: SHIPPED | OUTPATIENT
Start: 2020-07-02 | End: 2020-07-05

## 2020-07-02 NOTE — PROGRESS NOTES
"SUBJECTIVE  HPI: Blas Perkins is a 39 year old female who presents for evaluation of back pain  Symptoms began 1 day(s) ago, have been onset gradual and are worse.  Pain is located in the low back bilateral region, with radiation to does not radiate, and are at worst a 5 on a scale of 1-10.  Recent injury:fall/near fall  Personal hx of back pain is recurrent self limited episodes of low back pain in the past.  Pain is exacerbated by: movements and bending.  Pain is relieved by: nothing   Associated sx include: spasms.  Red flag symptoms: negative, fever, chills.    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 s/p cyerknife times 2 2016 for residual, follows regularly with Dr. Quintana at [facility].     Thrombocytopenia (H) 9/30/2014     Thrombocytopenia (H) 9/30/2014     TMJ (temporomandibular joint syndrome) 1/23/2012     Tobacco abuse      Allergies   Allergen Reactions     Chantix [Varenicline]      Increased \"crabbiness\"     Social History     Tobacco Use     Smoking status: Current Some Day Smoker     Packs/day: 0.25     Years: 10.00     Pack years: 2.50     Types: Cigarettes     Start date: 12/20/1997     Smokeless tobacco: Never Used   Substance Use Topics     Alcohol use: No     Comment: daily x 5 years-at least a bottle of wine   11/19/13 No alcohol use     Family History   Problem Relation Age of Onset     Family History Negative Mother      Substance Abuse Mother      Family History Negative Father      Family History Negative Sister      Family History Negative Brother      Diabetes Maternal Grandfather      Substance Abuse Maternal Grandfather      " Substance Abuse Maternal Grandmother      Unknown/Adopted Paternal Grandmother      Unknown/Adopted Paternal Grandfather      Glaucoma No family hx of      Macular Degeneration No family hx of        ROS:  CONSTITUTIONAL:NEGATIVE for fever, chills, change in weight  INTEGUMENTARY/SKIN: NEGATIVE for worrisome rashes, moles or lesions  ENT/MOUTH: NEGATIVE for ear, mouth and throat problems  RESP:NEGATIVE for significant cough or SOB  CV: NEGATIVE for chest pain, palpitations or peripheral edema  GI: NEGATIVE for nausea, abdominal pain, heartburn, or change in bowel habits  : negative for, dysuria and frequency   MUSCULOSKELETAL: POSITIVE  for lower back pain, tenderness, spasms  VASC: NEGATIVE for cool extremities  NEURO: NEGATIVE for weakness, dizziness or paresthesias    OBJECTIVE:  Pulse 64   Temp 97.7  F (36.5  C) (Oral)   Wt 67.5 kg (148 lb 12.8 oz)   SpO2 100%   BMI 23.31 kg/m    Back examination: Back symmetric, no curvature. ROM normal. No CVA tenderness., positive findings: paraspinal muscle spasm, tenderness to palpation mid back  STRAIGHT leg raise test: negative  GENERAL APPEARANCE: healthy, alert and no distress  RESP: lungs clear to auscultation - no rales, rhonchi or wheezes  CV: regular rates and rhythm, normal S1 S2, no murmur noted  ABDOMEN:  soft, nontender, no HSM or masses and bowel sounds normal  NEURO: Normal strength and tone with no weakness or sensory deficit noted, reflexes normal   Extremities: no peripheral edema or tenderness, peripheral pulses normal  MS:  tender to palpation mid lower back tenderness, pain  SKIN: no suspicious lesions or rashes    ASSESSMENT/IMPRESSION/PLAN      ICD-10-CM    1. Acute midline low back pain without sciatica  M54.5 methylPREDNISolone (MEDROL DOSEPAK) 4 MG tablet therapy pack     HYDROcodone-acetaminophen (NORCO) 5-325 MG tablet   2. Back muscle spasm  M62.830 methocarbamol (ROBAXIN) 750 MG tablet     EDUCATION      1.  Continue stretching and  strengthening exercises.       2.  Continue prn heat or ice application.    Orders Placed This Encounter     methylPREDNISolone (MEDROL DOSEPAK) 4 MG tablet therapy pack     methocarbamol (ROBAXIN) 750 MG tablet     HYDROcodone-acetaminophen (NORCO) 5-325 MG tablet     Alternate ice and warm moist compresses  ROM exercises  Follow up with PCP as needed

## 2021-01-06 ENCOUNTER — MYC MEDICAL ADVICE (OUTPATIENT)
Dept: OBGYN | Facility: CLINIC | Age: 41
End: 2021-01-06

## 2021-01-06 ENCOUNTER — PRENATAL OFFICE VISIT (OUTPATIENT)
Dept: NURSING | Facility: CLINIC | Age: 41
End: 2021-01-06
Payer: COMMERCIAL

## 2021-01-06 DIAGNOSIS — Z34.80 PRENATAL CARE, SUBSEQUENT PREGNANCY, UNSPECIFIED TRIMESTER: Primary | ICD-10-CM

## 2021-01-06 PROCEDURE — 99207 PR NO CHARGE NURSE ONLY: CPT

## 2021-01-06 NOTE — PROGRESS NOTES
NPN nurse visit done over the phone. Pt will be given NPN folder and book at her upcoming appt.   Discussed optional screening available to assess chromosomal anomalies. Questions answered. Pt advised to call the clinic if she has any questions or concerns related to her pregnancy. Prenatal labs will be obtained at her upcoming appt. New prenatal visit scheduled on 1/26/21 with Dr cox    8w0d    Lab Results   Component Value Date    PAP NIL 01/02/2018           Patient supplied answers from flow sheet for:  Prenatal OB Questionnaire.  Past Medical History  Diabetes?: No  Hypertension : No  Heart disease, mitral valve prolapse or rheumatic fever?: No  An autoimmune disease such as lupus or rheumatoid arthritis?: No  Kidney disease or urinary tract infection?: No  Epilepsy, seizures or spells?: No  Migraine headaches?: No  A stroke or loss of function or sensation?: No  Any other neurological problems?: No  Have you ever been treated for depression?: No  Are you having problems with crying spells or loss of self-esteem?: No  Have you ever required psychiatric care?: No  Have you ever had hepatitis, liver disease or jaundice?: No  Have you been treated for blood clots in your veins, deep vein thromosis, inflammation in the veins, thrombosis, phlebitis, pulmonary embolism or varicosities?: No  Have you had excessive bleeding after surgery or dental work?: No  Do you bleed more than other women after a cut or scratch?: No  Do you have a history of anemia?: (!) Yes(after surgery)  Have you ever had thyroid problems or taken thyroid medication?: No   Do you have any endocrine problems?: No  Have you ever been in a major accident or suffered serious trauma?: No  Within the last year, has anyone hit, slapped, kicked or otherwise hurt you?: No  In the last year, has anyone forced you to have sex when you didn't want to?: No    Past Medical History 2   Have you ever received a blood transfusion?: (!) Yes(no  complcations)  Would you refuse a blood transfusion if a doctor judged it to be medically necessary?: No   If you answered Yes, would you rather die than receive a blood transfusion?: No  If you answered Yes, is this for Hinduism reasons?: No  Does anyone in your home smoke?: No  Do you use tobacco products?: (!) Yes  Do you drink beer, wine or hard liquor?: No  Do you use any of the following: marijuana, speed, cocaine, heroin, hallucinogens or other drugs?: No   Is your blood type Rh negative?: No  Have you ever had abnormal antibodies in your blood?: No  Have you ever had asthma?: No  Have you ever had tuberculosis?: No  Do you have any allergies to drugs or over-the-counter medications?: No  Allergies: Dust Mites, Aspartame, Ethanol, Venlafaxine, Hydrochloride, Sertraline: No  Have you had any breast problems?: No  Have you ever ?: (!) Yes  Have you had any gynecological surgical procedures such as cervical conization, a LEEP procedure, laser treatment, cryosurgery of the cervix or a dilation and curettage, etc?: No  Have you ever had any other surgical procedures?: (!) Yes  Have you been hospitalized for a nonsurgical reason excluding normal delivery?: No  Have you ever had any anesthetic complications?: No  Have you ever had an abnormal pap smear?: (!) Yes    Past Medical History (Continued)  Do you have a history of abnormalities of the uterus?: No  Did your mother take JEWELL or any other hormones when she was pregnant with you?: No  Did it take you more than a year to become pregnant?: No  Have you ever been evaluated or treated for infertility?: No  Is there a history of medical problems in your family, which you feel may be important to this pregnancy?: No  Do you have any other problems we have not asked about which you feel may be important to this pregnancy?: No    Symptoms since last menstrual period  Do you have any of the following symptoms: abdominal pain, blood in stools or urine, chest  pain, shortness of breath, coughing or vomiting up blood, your heart racing or skipping beats, nausea and vomiting, pain on urination or vaginal discharge or bleed: No  Will the patient be 35 years old or older at the time of delivery?: (!) Yes    Has the patient, baby's father or anyone in either family had:  Thalassemia (Italian, Greek, Mediterranean or  background only) and an MCV result less than 80?: No  Neural tube defect such as meningomyelocele, spina bifida or anencephaly?: No  Congenital heart defect?: No  Down's Syndrome?: No  Ron-Sachs disease (Presybeterian, Cajun, Uzbek-Pondera)?: No  Sickle cell disease or trait ()?: No  Hemophilia or other inherited problems of blood?: No  Muscular dystrophy?: No  Cystic fibrosis?: No  Umu's chorea?: No  Mental retardation/autism?: No  If yes, was the person tested for fragile X?: No  Any other inherited genetic or chromosomal disorder?: No  Maternal metabolic disorder (e.g Insulin-dependent diabetes, PKU)?: No  A child with birth defects not listed above?: No  Recurrent pregnancy loss or stillbirth?: No   Has the patient had any medications/street drugs/alcohol since her last menstrual period?: No  Does the patient or baby's father have any other genetic risks?: No    Infection History   Do you object to being tested for Hepatitis B?: No  Do you object to being tested for HIV?: No   Do you feel that you are at high risk for coming in contact with the AIDS virus?: No  Have you ever been treated for tuberculosis?: No  Have you ever had a positive skin test for tuberculosis?: No  Do you live with someone who has tuberculosis?: No  Have you ever been exposed to tuberculosis?: No  Do you have genital herpes?: No  Does your partner have genital herpes?: No  Have you had a viral illness since your last period?: No  Have you ever had gonorrhea, chlamydia, syphilis, venereal warts, trichomoniasis, pelvic inflammatory disease or any other sexually transmitted  disease?: No  Do you know if you are a genital group B streptococcus carrier?: No  Have you had chicken pox/varicella?: (!) Yes   Have you been vaccinated against chicken Pox?: No  Have you had any other infectious diseases?: No

## 2021-01-13 ENCOUNTER — OFFICE VISIT (OUTPATIENT)
Dept: OBGYN | Facility: CLINIC | Age: 41
End: 2021-01-13
Payer: COMMERCIAL

## 2021-01-13 ENCOUNTER — ANCILLARY PROCEDURE (OUTPATIENT)
Dept: ULTRASOUND IMAGING | Facility: CLINIC | Age: 41
End: 2021-01-13
Payer: COMMERCIAL

## 2021-01-13 VITALS — DIASTOLIC BLOOD PRESSURE: 56 MMHG | WEIGHT: 139.2 LBS | SYSTOLIC BLOOD PRESSURE: 100 MMHG | BODY MASS INDEX: 21.8 KG/M2

## 2021-01-13 DIAGNOSIS — Z34.80 PRENATAL CARE, SUBSEQUENT PREGNANCY, UNSPECIFIED TRIMESTER: ICD-10-CM

## 2021-01-13 DIAGNOSIS — O02.1 MISSED ABORTION: Primary | ICD-10-CM

## 2021-01-13 PROCEDURE — 99213 OFFICE O/P EST LOW 20 MIN: CPT | Performed by: OBSTETRICS & GYNECOLOGY

## 2021-01-13 PROCEDURE — 76801 OB US < 14 WKS SINGLE FETUS: CPT | Performed by: OBSTETRICS & GYNECOLOGY

## 2021-01-13 PROCEDURE — 76817 TRANSVAGINAL US OBSTETRIC: CPT | Performed by: OBSTETRICS & GYNECOLOGY

## 2021-01-13 NOTE — NURSING NOTE
"Chief Complaint   Patient presents with     Results     NPN ultrasound done today - No cardiac activity   9w0d    initial /56   Wt 63.1 kg (139 lb 3.2 oz)   LMP 11/11/2020   BMI 21.80 kg/m   Estimated body mass index is 21.8 kg/m  as calculated from the following:    Height as of 7/11/18: 1.702 m (5' 7\").    Weight as of this encounter: 63.1 kg (139 lb 3.2 oz).  BP completed using cuff size regular.  Mitzi Coreas CMA    "

## 2021-01-13 NOTE — PROGRESS NOTES
39yo  at 9w0d by LMP and early UPT (+ on 20) referred form [facility] U/S after dating U/S today showed IUFD.    Patient has experienced 1st trimester losses in past and is aware the risk is greater with AMA    Has experienced a spontaneous ab and a D&C with prior losses       Discussed management options:     1. Expectant management. Discussed that in the absence of signs of infection, waiting 2-4 weeks is a reasonable option and that up to 80% of missed abortions will spontaneously pass by 8 weeks. Limited data that there are higher success rates in symptomatic women. Discussed unpredictable timing. Discussed what to expect in terms of painful cramping and 2 hours of heavy bleeding, passing tissue and clots. Recommend another adult is home with her when she is bleeding and miscarrying. Counseled patient to seek medical attention if heavy bleeding persists beyond 2 hours, if severe pain, if fever/chills or foul smelling discharge, if syncopal or concern for hemorrhaging.  2. Medical management with misoprostol. Recommend 800mcg vaginally. May repeat 12 hours later.  Discussed that misoprostol helps speed up the process and makes timing more predictable. Discussed that about 80% of women will complete miscarriage within 24 hours.   3. Surgical management. Offered and discussed suction dilation and curettage procedure. Counseled patient that surgical evacuation results in faster and more predictable complete evacuation compared to expectant and medical management. Discussed that is an outpatient procedure, is associated with less blood loss than other 2 options, is more predictable in terms of planning, physically will be the most comfortable but does have risks of uterine perforation, cervical laceration, Asherman's syndrome. Plan to administer a single 200-mg dose of doxycycline 1 hour before surgical management of early pregnancy loss to prevent postoperative infection. Discussed that pregnancy tissue would  be evaluated by pathology, then could be released to a  home if she wishes. Discussed potential cytogenetic evaluation if D&C but encouraged her to contact her insurance to see if it would be covered. Discussed that this could be scheduled, usually within a few days if she desires this option. Discussed that suction D&C may need to be performed if incomplete  or if excessive bleeding with either expectant management or misoprostol.     Patient desires expectant management at present.  She understands she can contact the office in the future if she desires medical or surgical management.

## 2021-01-15 ENCOUNTER — HEALTH MAINTENANCE LETTER (OUTPATIENT)
Age: 41
End: 2021-01-15

## 2021-01-25 ENCOUNTER — TELEPHONE (OUTPATIENT)
Dept: OBGYN | Facility: CLINIC | Age: 41
End: 2021-01-25

## 2021-01-25 DIAGNOSIS — Z01.818 PREPROCEDURAL EXAMINATION: Primary | ICD-10-CM

## 2021-01-25 DIAGNOSIS — Z20.822 COVID-19 RULED OUT: ICD-10-CM

## 2021-01-25 NOTE — TELEPHONE ENCOUNTER
I would forward this to Dr. Coombs, so that he follows-up with this when he returns to office.    Osmin Brewer MD

## 2021-01-25 NOTE — TELEPHONE ENCOUNTER
Pt sent a message to schedule a D&C.  See note 1/13/21.  She would like to schedule Fri am or Tues.am with Dr Coombs.  Will need orders placed please.  Funmi Amanda RN

## 2021-01-25 NOTE — TELEPHONE ENCOUNTER
Type of surgery: suction dilation and curettage  Location of surgery: [facility]  Date and time of surgery: 1/29/21 @ 7:00 am  Surgeon: Dr. Coombs  Pre-Op Appt Date: 1/28/21  Post-Op Appt Date:    Packet sent out: Yes  Pre-cert/Authorization completed:  No  Date: 1/25/21

## 2021-01-26 ENCOUNTER — OFFICE VISIT (OUTPATIENT)
Dept: URGENT CARE | Facility: URGENT CARE | Age: 41
End: 2021-01-26
Attending: OBSTETRICS & GYNECOLOGY
Payer: COMMERCIAL

## 2021-01-26 DIAGNOSIS — F17.200 NICOTINE DEPENDENCE, UNCOMPLICATED, UNSPECIFIED NICOTINE PRODUCT TYPE: Primary | ICD-10-CM

## 2021-01-26 DIAGNOSIS — Z20.822 COVID-19 RULED OUT: ICD-10-CM

## 2021-01-26 DIAGNOSIS — Z01.818 PREPROCEDURAL EXAMINATION: ICD-10-CM

## 2021-01-26 LAB
LABORATORY COMMENT REPORT: NORMAL
SARS-COV-2 RNA RESP QL NAA+PROBE: NEGATIVE
SARS-COV-2 RNA RESP QL NAA+PROBE: NORMAL
SPECIMEN SOURCE: NORMAL
SPECIMEN SOURCE: NORMAL

## 2021-01-26 PROCEDURE — U0005 INFEC AGEN DETEC AMPLI PROBE: HCPCS | Performed by: OBSTETRICS & GYNECOLOGY

## 2021-01-26 PROCEDURE — U0003 INFECTIOUS AGENT DETECTION BY NUCLEIC ACID (DNA OR RNA); SEVERE ACUTE RESPIRATORY SYNDROME CORONAVIRUS 2 (SARS-COV-2) (CORONAVIRUS DISEASE [COVID-19]), AMPLIFIED PROBE TECHNIQUE, MAKING USE OF HIGH THROUGHPUT TECHNOLOGIES AS DESCRIBED BY CMS-2020-01-R: HCPCS | Performed by: OBSTETRICS & GYNECOLOGY

## 2021-01-26 NOTE — PATIENT INSTRUCTIONS
HOW TO QUIT SMOKING  Smoking is one of the hardest habits to break. About half of all those who have ever smoked have been able to quit, and most of those (about 70%) who still smoke want to quit. Here are some of the best ways to stop smoking.     KEEP TRYING:  It takes most smokers about 8 tries before they are finally able to fully quit. So, the more often you try and fail, the better your chance of quitting the next time! So, don't give up!    GO COLD TURKEY:  Most ex-smokers quit cold turkey. Trying to cut back gradually doesn't seem to work as well, perhaps because it continues the smoking habit. Also, it is possible to fool yourself by inhaling more while smoking fewer cigarettes. This results in the same amount of nicotine in your body!    GET SUPPORT:  Support programs can make an important difference, especially for the heavy smoker. These groups offer lectures, methods to change your behavior and peer support. Call the free national Quitline for more information. 800-QUIT-NOW (305-128-7591). Low-cost or free programs are offered by many hospitals, local chapters of the American Lung Association (957-881-0728) and the American Cancer Society (547-409-7585). Support at home is important too. Non-smokers can help by offering praise and encouragement. If the smoker fails to quit, encourage them to try again!    OVER-THE-COUNTER MEDICINES:  For those who can't quit on their own, Nicotine Replacement Therapy (NRT) may make quitting much easier. Certain aids such as the nicotine patch, gum and lozenge are available without a prescription. However, it is best to use these under the guidance of your doctor. The skin patch provides a steady supply of nicotine to the body. Nicotine gum and lozenge gives temporary bursts of low levels of nicotine. Both methods take the edge off the craving for cigarettes. WARNING: If you feel symptoms of nicotine overdose, such as nausea, vomiting, dizziness, weakness, or fast  heartbeat, stop using these and see your doctor.    PRESCRIPTION MEDICINES:  After evaluating your smoking patterns and prior attempts at quitting, your doctor may offer a prescription medicine such as bupropion (Zyban, Wellbutrin), varenicline (Chantix, Champix), a niocotine inhaler or nasal spray. Each has its unique advantage and side effects which your doctor can review with you.    HEALTH BENEFITS OF QUITTING:  The benefits of quitting start right away and keep improving the longer you go without smokin minutes: blood pressure and pulse return to normal  8 hours: oxygen levels return to normal  2 days: ability to smell and taste begins to improve as damaged nerves start to regrow  2-3 weeks: circulation and lung function improves  1-9 months: decreased cough, congestion and shortness of breath; less tired  1 year: risk of heart attack decreases by half  5 years: risk of lung cancer decreases by half; risk of stroke becomes the same as a non-smoker  For information about how to quit smoking, visit the following links:  National Cancer [place] ,   Clearing the Air, Quit Smoking Today   - an online booklet. http://www.smokefree.gov/pubs/clearing_the_air.pdf  Smokefree.gov http://smokefree.gov/  QuitNet http://www.quitnet.com/    0096-6395 Lani Paul, [address] [place] [address], [place] [address]. All rights reserved. This information is not intended as a substitute for professional medical care. Always follow your healthcare professional's instructions.

## 2021-01-27 ENCOUNTER — TELEPHONE (OUTPATIENT)
Dept: OBGYN | Facility: CLINIC | Age: 41
End: 2021-01-27

## 2021-01-27 DIAGNOSIS — O02.1 MISSED ABORTION: Primary | ICD-10-CM

## 2021-01-27 RX ORDER — MISOPROSTOL 200 UG/1
800 TABLET ORAL ONCE
Qty: 4 TABLET | Refills: 0 | Status: SHIPPED | OUTPATIENT
Start: 2021-01-27 | End: 2021-01-28

## 2021-01-27 NOTE — TELEPHONE ENCOUNTER
Advise Pt that she can try the Cytotec today. I sent Rx Cytotec 800 mcg vaginally x 1 dose to her pharm.  She should use it today.  She should keep her preop appt and D&C as scheduled until she actually passes tissue, so that if the Cytotec doesn't work, she has her surgery ready to proceed on Friday.  The Cytotec may also make portions of the surgery technically easier as well, even if the SAB does not occur after the dose.  I will send this to Dr. Coombs as an FYI also.

## 2021-01-27 NOTE — TELEPHONE ENCOUNTER
Pt calling, she started having brown spotting last evening.  Denies cramping, she is unsure if she wants the D&C that is scheduled Friday with the pandemic going on.    She is wondering if she can opt for the cytotec instead of surgery at this point?    Pt saw Dr Coombs on 1/13 and discussed options.  Dr Coombs out until tomorrow.  Routing to on call to advise.    Cindy Rainey RN

## 2021-01-28 ENCOUNTER — OFFICE VISIT (OUTPATIENT)
Dept: INTERNAL MEDICINE | Facility: CLINIC | Age: 41
End: 2021-01-28
Payer: COMMERCIAL

## 2021-01-28 VITALS
BODY MASS INDEX: 22.37 KG/M2 | HEART RATE: 67 BPM | WEIGHT: 139.2 LBS | SYSTOLIC BLOOD PRESSURE: 102 MMHG | OXYGEN SATURATION: 100 % | HEIGHT: 66 IN | TEMPERATURE: 98.5 F | RESPIRATION RATE: 16 BRPM | DIASTOLIC BLOOD PRESSURE: 60 MMHG

## 2021-01-28 DIAGNOSIS — O02.1 IUFD AT LESS THAN 20 WEEKS OF GESTATION: ICD-10-CM

## 2021-01-28 DIAGNOSIS — Z23 ENCOUNTER FOR IMMUNIZATION: ICD-10-CM

## 2021-01-28 DIAGNOSIS — D32.9 MENINGIOMA OF RIGHT SPHENOID WING INVOLVING CAVERNOUS SINUS (H): ICD-10-CM

## 2021-01-28 DIAGNOSIS — Z00.00 ENCOUNTER FOR ROUTINE ADULT HEALTH EXAMINATION WITHOUT ABNORMAL FINDINGS: Primary | ICD-10-CM

## 2021-01-28 PROCEDURE — 90471 IMMUNIZATION ADMIN: CPT | Performed by: INTERNAL MEDICINE

## 2021-01-28 PROCEDURE — 99214 OFFICE O/P EST MOD 30 MIN: CPT | Mod: 25 | Performed by: INTERNAL MEDICINE

## 2021-01-28 PROCEDURE — 90686 IIV4 VACC NO PRSV 0.5 ML IM: CPT | Performed by: INTERNAL MEDICINE

## 2021-01-28 ASSESSMENT — MIFFLIN-ST. JEOR: SCORE: 1312.13

## 2021-01-28 NOTE — PROGRESS NOTES
[address] [address]  Phone: [phone]  Primary Provider: Dong Antonio  Pre-op Performing Provider: YANG DONALDSON    PREOPERATIVE EVALUATION:  Today's date: 1/28/2021    Blas Perkins is a 40 year old female who presents for a preoperative evaluation.    Surgical Information:  Surgery/Procedure: D&C  Surgery Location: [facility]  Surgeon: Dr. Joseph  Surgery Date: 1/29/2021  Time of Surgery: 7Am  Where patient plans to recover: At home with family  Fax number for surgical facility: Note does not need to be faxed, will be available electronically in Epic.    Type of Anesthesia Anticipated: to be determined    Subjective     HPI related to upcoming procedure: Ayaka presents for a pre-op. It was recently found that she has suffered IUFD. She initially chose expectant management, then tried medical management. Pursuing surgical management tomorrow. No acute complaints.    Preop Questions 1/28/2021   1. Have you ever had a heart attack or stroke? No   2. Have you ever had surgery on your heart or blood vessels, such as a stent placement, a coronary artery bypass, or surgery on an artery in your head, neck, heart, or legs? No   3. Do you have chest pain with activity? No   4. Do you have a history of  heart failure? No   5. Do you currently have a cold, bronchitis or symptoms of other infection? No   6. Do you have a cough, shortness of breath, or wheezing? No   7. Do you or anyone in your family have previous history of blood clots? No   8. Do you or does anyone in your family have a serious bleeding problem such as prolonged bleeding following surgeries or cuts? No   9. Have you ever had problems with anemia or been told to take iron pills? YES - noted   10. Have you had any abnormal blood loss such as black, tarry or bloody stools, or abnormal vaginal bleeding? No   11. Have you ever had a blood transfusion? YES - noted   11a. Have you  ever had a transfusion reaction? No   12. Are you willing to have a blood transfusion if it is medically needed before, during, or after your surgery? Yes   13. Have you or any of your relatives ever had problems with anesthesia? No   14. Do you have sleep apnea, excessive snoring or daytime drowsiness? No   15. Do you have any artifical heart valves or other implanted medical devices like a pacemaker, defibrillator, or continuous glucose monitor? No   16. Do you have artificial joints? No   17. Are you allergic to latex? No   18. Is there any chance that you may be pregnant? YES - noted     Health Care Directive:  Patient does not have a Health Care Directive or Living Will: Discussed advance care planning with patient; however, patient declined at this time.    Preoperative Review of :   reviewed - old pain med script    Review of Systems  CONSTITUTIONAL: NEGATIVE for fever, chills, change in weight  INTEGUMENTARY/SKIN: NEGATIVE for worrisome rashes, moles or lesions  EYES: NEGATIVE for vision changes or irritation  ENT/MOUTH: NEGATIVE for ear, mouth and throat problems  RESP: NEGATIVE for significant cough or SOB  BREAST: NEGATIVE for masses, tenderness or discharge  CV: NEGATIVE for chest pain, palpitations or peripheral edema  GI: NEGATIVE for nausea, abdominal pain, heartburn, or change in bowel habits  : NEGATIVE for frequency, dysuria, or hematuria. Some vaginal bleeding.  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 Laura Quintana at [facility] ([phone]).       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 11/25/2014     Priority: Medium     History of reversal of tubal ligation 10/29/2014     Priority: Medium     Chemical dependency (H) 09/20/2013     Priority: Medium     TMJ (temporomandibular joint syndrome) 01/23/2012     Priority: Medium     Anxiety 01/23/2012     Priority: Medium     CARDIOVASCULAR SCREENING; LDL GOAL LESS THAN 160 10/31/2010     Priority: Medium     Tobacco abuse      Priority: Medium     History of colposcopy with cervical biopsy 02/16/2006     Priority: Medium     HSIL (high grade squamous intraepithelial lesion) on Pap smear of cervix 11/17/2005     Priority: Medium      Past Medical History:   Diagnosis Date     Alcohol abuse October 2013     Anemia 9/30/2014    after surgery     Anxiety 1/23/2012    in past     History of colposcopy with cervical biopsy 02/16/06,08/31/06    MORGAN 1     HSIL (high grade squamous intraepithelial lesion) on Pap smear of cervix 11/17/2005     Meningioma (H) 06/2014    right sphenoid wing meningioma affecting right optic nerve. s/p cyerknife times 2 2016 for residual, follows regularly with Dr. Quintana at [facility].     Thrombocytopenia (H) 9/30/2014     Thrombocytopenia (H) 9/30/2014     TMJ (temporomandibular joint syndrome) 1/23/2012     Tobacco abuse      Past Surgical History:   Procedure Laterality Date     BIOPSY  unsure    lump removed from leg     C LIGATE FALLOPIAN TUBE  2000    reversal done     OPTICAL TRACKING SYSTEM CRANIOTOMY, EXCISE TUMOR, COMBINED Right 9/24/2014    Procedure: COMBINED OPTICAL TRACKING SYSTEM CRANIOTOMY, EXCISE TUMOR;  Surgeon: Austin Mccallum MD;  Location: [facility]     OPTICAL TRACKING SYSTEM ENDOSCOPIC ENDONASAL SURGERY Right 7/7/2017    Procedure: OPTICAL TRACKING SYSTEM ENDOSCOPIC ENDONASAL SURGERY;  Right Orbital  "Decompression with optical tracking;  Surgeon: Blake Medina MD;  Location:  OR     OPTICAL TRACKING SYSTEM ORBITOTOMY Right 7/7/2017    Procedure: OPTICAL TRACKING SYSTEM ORBITOTOMY;  Medial Wall decompression;  Surgeon: Jorge Luis Desir MD;  Location:  OR     [facility] NONSPECIFIC PROCEDURE  1979    tubes in ears     [facility] NONSPECIFIC PROCEDURE  10/08    Wide excision of left thigh melanoma and left inguinal sentinel node biopsy.      Current Outpatient Medications   Medication Sig Dispense Refill     Prenatal Vit-Fe Fumarate-FA (PRENATAL MULTIVITAMIN PLUS IRON) 27-0.8 MG TABS per tablet Take 1 tablet by mouth daily 100 tablet 3       Allergies   Allergen Reactions     Chantix [Varenicline]      Increased \"crabbiness\"        Social History     Tobacco Use     Smoking status: Current Some Day Smoker     Packs/day: 0.25     Years: 10.00     Pack years: 2.50     Types: Cigarettes     Start date: 12/20/1997     Smokeless tobacco: Never Used   Substance Use Topics     Alcohol use: No     Comment: daily x 5 years-at least a bottle of wine   11/19/13 No alcohol use     History   Drug Use No         Objective   /60   Pulse 67   Temp 98.5  F (36.9  C) (Temporal)   Resp 16   Ht 1.683 m (5' 6.25\")   Wt 63.1 kg (139 lb 3.2 oz)   LMP 11/11/2020   SpO2 100%   Breastfeeding Unknown   BMI 22.30 kg/m      Physical Exam  GENERAL: alert and in no distress.  EYES conjunctivae/corneas clear. EOMs grossly intact  HENT: NC/AT, facies symmetric  NECK: Neck supple. No LAD, without thyroidmegaly or JVD.  LYMPH: no cervical LAD appreciated  RESP: CTAB. No w/r/r.  CV: RRR, no m/r/g.  GI: NT, without rebound or guarding, no CVA tenderness  MSK: No cyanosis, clubbing or edema noted bilaterally in upper and/or lower extremities  SKIN: no significant ulcers, lesions or rashes on the visualized portions of the skin  NEURO: Alert. Oriented. Gait normal. Sensation grossly WNL.  PSYCH: Linear thought process. Speech normal " rate and volume; able to articulate logical thoughts, able to abstract reason. No tangential thoughts, hallucinations, or delusions.    No results for input(s): HGB, PLT, INR, NA, POTASSIUM, CR, A1C in the last 05496 hours.     Diagnostics:  No labs were ordered during this visit.   No EKG required for low risk surgery (cataract, skin procedure, breast biopsy, etc).  No EKG required, no history of coronary heart disease, significant arrhythmia, peripheral arterial disease or other structural heart disease.    Revised Cardiac Risk Index (RCRI):  The patient has the following serious cardiovascular risks for perioperative complications:   - No serious cardiac risks = 0 points     RCRI Interpretation: 0 points: Class I (very low risk - 0.4% complication rate)       Assessment & Plan   The proposed surgical procedure is considered LOW risk.    Encounter for routine adult health examination without abnormal findings  IUFD at less than 20 weeks of gestation  Approval given to proceed with proposed procedure without further diagnostic evaluation. Discussed recommendation to discontinue NSAIDs 5 days prior to procedure to reduce bleeding risk. Medications were reviewed in detail today. She is not on any medications. Past labs reviewed. Given low-risk of procedure and relative normality of labs, none drawn today. No cardiac history and good exercise tolerance so no EKG.    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    Encounter for immunization  - HC FLU VAC PRESRV FREE QUAD SPLIT VIR > 6 MONTHS IM ([id number])      RECOMMENDATION:  APPROVAL GIVEN to proceed with proposed procedure, without further diagnostic evaluation.    Signed Electronically by: Carlos Segal MD    Copy of this evaluation report is provided to requesting physician.    [facility] a Student [facility]  Guidelines    Revised Cardiac Risk Index

## 2021-01-29 ENCOUNTER — HOSPITAL PATHOLOGY (OUTPATIENT)
Dept: OTHER | Facility: CLINIC | Age: 41
End: 2021-01-29

## 2021-01-29 ENCOUNTER — TRANSFERRED RECORDS (OUTPATIENT)
Dept: HEALTH INFORMATION MANAGEMENT | Facility: CLINIC | Age: 41
End: 2021-01-29

## 2021-02-01 LAB — COPATH REPORT: NORMAL

## 2021-03-14 ENCOUNTER — HEALTH MAINTENANCE LETTER (OUTPATIENT)
Age: 41
End: 2021-03-14

## 2021-06-07 ENCOUNTER — OFFICE VISIT (OUTPATIENT)
Dept: INTERNAL MEDICINE | Facility: CLINIC | Age: 41
End: 2021-06-07
Payer: COMMERCIAL

## 2021-06-07 VITALS
DIASTOLIC BLOOD PRESSURE: 66 MMHG | HEART RATE: 74 BPM | WEIGHT: 136.1 LBS | OXYGEN SATURATION: 100 % | BODY MASS INDEX: 21.8 KG/M2 | TEMPERATURE: 99 F | SYSTOLIC BLOOD PRESSURE: 114 MMHG

## 2021-06-07 DIAGNOSIS — M54.41 ACUTE MIDLINE LOW BACK PAIN WITH BILATERAL SCIATICA: Primary | ICD-10-CM

## 2021-06-07 DIAGNOSIS — M54.42 ACUTE MIDLINE LOW BACK PAIN WITH BILATERAL SCIATICA: Primary | ICD-10-CM

## 2021-06-07 PROCEDURE — 99213 OFFICE O/P EST LOW 20 MIN: CPT | Performed by: INTERNAL MEDICINE

## 2021-06-07 RX ORDER — METHOCARBAMOL 500 MG/1
500 TABLET, FILM COATED ORAL 4 TIMES DAILY PRN
Qty: 30 TABLET | Refills: 0 | Status: SHIPPED | OUTPATIENT
Start: 2021-06-07 | End: 2023-04-02

## 2021-06-07 RX ORDER — METHYLPREDNISOLONE 4 MG
TABLET, DOSE PACK ORAL
Qty: 21 TABLET | Refills: 0 | Status: SHIPPED | OUTPATIENT
Start: 2021-06-07 | End: 2023-04-02

## 2021-06-07 NOTE — LETTER
06/07/21      Blas Perkins  1980  [id number] [facility] [address]        To whom it may concern,    Please excuse Ms. Perkins from work today and tomorrow. She will be needing time to rest and recuperate from an illness that I am seeing her for. She may return to work on Wednesday without restrictions.    Please contact me with any question or concerns.        Uma Wheeler MD   [place] [address] [address] [address]  T: [phone], F: [phone]

## 2021-06-07 NOTE — PATIENT INSTRUCTIONS
Medrol dose pack - please use as directed.    Robaxin - 1 tab 4x/day as needed for muscle tightness.    Heating pad as needed.

## 2021-10-24 ENCOUNTER — HEALTH MAINTENANCE LETTER (OUTPATIENT)
Age: 41
End: 2021-10-24

## 2022-02-13 ENCOUNTER — HEALTH MAINTENANCE LETTER (OUTPATIENT)
Age: 42
End: 2022-02-13

## 2022-10-15 ENCOUNTER — HEALTH MAINTENANCE LETTER (OUTPATIENT)
Age: 42
End: 2022-10-15

## 2022-12-02 ENCOUNTER — VIRTUAL VISIT (OUTPATIENT)
Dept: FAMILY MEDICINE | Facility: CLINIC | Age: 42
End: 2022-12-02
Payer: COMMERCIAL

## 2022-12-02 DIAGNOSIS — J32.9 SINUSITIS, UNSPECIFIED CHRONICITY, UNSPECIFIED LOCATION: Primary | ICD-10-CM

## 2022-12-02 PROCEDURE — 99213 OFFICE O/P EST LOW 20 MIN: CPT | Mod: GT | Performed by: FAMILY MEDICINE

## 2022-12-02 NOTE — PROGRESS NOTES
"Answers for HPI/ROS submitted by the patient on 12/1/2022  How many servings of fruits and vegetables do you eat daily?: 2-3  On average, how many sweetened beverages do you drink each day (Examples: soda, juice, sweet tea, etc.  Do NOT count diet or artificially sweetened beverages)?: 2  How many minutes a day do you exercise enough to make your heart beat faster?: 30 to 60  How many days a week do you exercise enough to make your heart beat faster?: 6  How many days per week do you miss taking your medication?: 0  What is the reason for your visit today?: Possible strep  When did your symptoms begin?: 1-2 weeks ago  What are your symptoms?: Sore throat coughing preschooler in the home tested positive and probably shared  How would you describe these symptoms?: Moderate  Are your symptoms:: Staying the same  Have you had these symptoms before?: Yes  Have you tried or received treatment for these symptoms before?: Yes  Did that treatment work? : No    Blas is a 42 year old who is being evaluated via a billable video visit.      How would you like to obtain your AVS? MyChart  If the video visit is dropped, the invitation should be resent by: Text to cell phone: [phone]  Will anyone else be joining your video visit? No      -------------------------    Assessment/Plan:    Blas Perkins is a 42 year old female presenting for:    Sinusitis, unspecified chronicity, unspecified location  Given she has a \"second sickening\" affect I think it would be reasonable to treat her for a sinus infection given her symptoms have been lingering.  I discussed with the patient that instead of prescribing antibiotics I am more than happy just to order a COVID and strep swab which she could get done at a testing facility (she lives in [place] and we are often [place] so it would not be ideal for her to need to drive all the way appear to get this done).    We discussed that certainly this could still be postviral from her " influenza in which case the antibiotics are unlikely to help and could cause side effect such as diarrhea and yeast infection.  Patient would like to trial antibiotics.  - amoxicillin-clavulanate (AUGMENTIN) 875-125 MG tablet  Dispense: 14 tablet; Refill: 0        There are no discontinued medications.        Chief Complaint:  No chief complaint on file.          Subjective:   Blas Perkins is a pleasant 42 year old female being evaluated via video visit today for the following concern/s:     Pharyngitis: Patient notes that about a month ago she believes she had influenza.  Her recent influenza and she was spending time with her and then got sick shortly thereafter although she did not get formally tested.  She had a cough and fever at that time.  She states that she got slightly better (although never back to baseline) and then got ill again with a sore throat and rhinorrhea.  This is now lasted for about 2 weeks.  Her son recently was diagnosed with strep throat yesterday.    Patient has not looked in the back of her throat.  She has taken some Tylenol and ibuprofen which helped transiently.  She has not had any fevers.  Some nasal congestion.      12 point review of systems completed and negative except for what has been described above    History   Smoking Status     Some Days     Packs/day: 0.25     Years: 10.00     Types: Cigarettes     Start date: 12/20/1997   Smokeless Tobacco     Never         Current Outpatient Medications:      amoxicillin-clavulanate (AUGMENTIN) 875-125 MG tablet, Take 1 tablet by mouth 2 times daily, Disp: 14 tablet, Rfl: 0     methocarbamol (ROBAXIN) 500 MG tablet, Take 1 tablet (500 mg) by mouth 4 times daily as needed for muscle spasms, Disp: 30 tablet, Rfl: 0     methylPREDNISolone (MEDROL DOSEPAK) 4 MG tablet therapy pack, Follow Package Directions, Disp: 21 tablet, Rfl: 0     Prenatal Vit-Fe Fumarate-FA (PRENATAL MULTIVITAMIN PLUS IRON) 27-0.8 MG TABS per tablet, Take 1 tablet  by mouth daily, Disp: 100 tablet, Rfl: 3        Objective:  No vitals were done due to the nature of this visit  No flowsheet data found.            General: No acute distress  Psych: Appropriate affect  HEENT: moist mucous membranes  Pulmonary: Breathing comfortably, speaking in complete sentences  Extremities: warm and well perfused with no edema  Skin: warm and dry with no rash         This note has been dictated and transcribed using voice recognition software.   Any errors in transcription are unintentional and inherent to the software.    Video-Visit Details    Video Start Time: 7:12 AM    Type of service:  Video Visit    Video End Time:729am    Originating Location (pt. Location): Home        Distant Location (provider location):  On-site    Platform used for Video Visit: Mobi-Moto

## 2023-03-26 ENCOUNTER — HEALTH MAINTENANCE LETTER (OUTPATIENT)
Age: 43
End: 2023-03-26

## 2023-03-27 ENCOUNTER — OFFICE VISIT (OUTPATIENT)
Dept: URGENT CARE | Facility: URGENT CARE | Age: 43
End: 2023-03-27
Payer: COMMERCIAL

## 2023-03-27 VITALS
HEART RATE: 78 BPM | DIASTOLIC BLOOD PRESSURE: 70 MMHG | TEMPERATURE: 99.1 F | RESPIRATION RATE: 16 BRPM | OXYGEN SATURATION: 100 % | SYSTOLIC BLOOD PRESSURE: 99 MMHG

## 2023-03-27 DIAGNOSIS — R07.0 THROAT PAIN: Primary | ICD-10-CM

## 2023-03-27 DIAGNOSIS — J02.0 STREPTOCOCCAL SORE THROAT: ICD-10-CM

## 2023-03-27 LAB — DEPRECATED S PYO AG THROAT QL EIA: POSITIVE

## 2023-03-27 PROCEDURE — 87880 STREP A ASSAY W/OPTIC: CPT | Performed by: PHYSICIAN ASSISTANT

## 2023-03-27 PROCEDURE — 99213 OFFICE O/P EST LOW 20 MIN: CPT | Performed by: PHYSICIAN ASSISTANT

## 2023-03-27 RX ORDER — AMOXICILLIN 875 MG
875 TABLET ORAL 2 TIMES DAILY
Qty: 20 TABLET | Refills: 0 | Status: SHIPPED | OUTPATIENT
Start: 2023-03-27 | End: 2023-04-06

## 2023-03-27 NOTE — PROGRESS NOTES
Assessment & Plan     Throat pain    Sore throat from strep throat  - Streptococcus A Rapid Screen w/Reflex to PCR    Streptococcal sore throat    You have had a positive test for strep throat. Strep throat is a contagious illness. It's spread by coughing, kissing, sharing glasses or eating utensils, or by touching others after touching your mouth or nose. Symptoms include throat pain that is worse with swallowing, aching all over, headache, swollen lymph nodes at the front of the neck, and red swollen tonsils sometimes with white patches and fever. It's treated with antibiotic medicine. This should help you start to feel better in 1 to 2 days.     - amoxicillin (AMOXIL) 875 MG tablet; Take 1 tablet (875 mg) by mouth 2 times daily for 10 days    Review of external notes as documented elsewhere in note       Nicotine/Tobacco Cessation:  She reports that she has been smoking cigarettes. She started smoking about 25 years ago. She has a 2.50 pack-year smoking history. She has never used smokeless tobacco.  Nicotine/Tobacco Cessation Plan:     At today's visit with Blas Perkins , we discussed results, diagnosis, medications and formulated a plan.  We also discussed red flags for immediate return to clinic/ER, as well as indications for follow up with PCP if not improved in 3 days. Patient understood and agreed to plan. Blas Perkins was discharged with stable vitals and has no further questions.       No follow-ups on file.    Justus Cortes, [facility], PA-C  [facility]    Brian Odonnell is a 42 year old, presenting for the following health issues:  Pharyngitis (Sore throat, hard to swallow X 1 day-Son has strep )  No flowsheet data found.  HPI   Review of Systems   Constitutional, HEENT, cardiovascular, pulmonary, gi and gu systems are negative, except as otherwise noted.      Objective    BP 99/70   Pulse 78   Temp 99.1  F (37.3  C) (Tympanic)   Resp 16   SpO2 100%   There is  no height or weight on file to calculate BMI.  Physical Exam   GENERAL: healthy, alert and no distress  EYES: Eyes grossly normal to inspection, PERRL and conjunctivae and sclerae normal  HENT: normal cephalic/atraumatic, ear canals and TM's normal and tonsillar erythema  NECK: no adenopathy, no asymmetry, masses, or scars and thyroid normal to palpation  RESP: lungs clear to auscultation - no rales, rhonchi or wheezes  CV: regular rate and rhythm, normal S1 S2, no S3 or S4, no murmur, click or rub, no peripheral edema and peripheral pulses strong  MS: no gross musculoskeletal defects noted, no edema  SKIN: no suspicious lesions or rashes  NEURO: Normal strength and tone, mentation intact and speech normal  PSYCH: mentation appears normal, affect normal/bright        Results for orders placed or performed in visit on 03/27/23   Streptococcus A Rapid Screen w/Reflex to PCR     Status: Abnormal    Specimen: Throat; Swab   Result Value Ref Range    Group A Strep antigen Positive (A) Negative

## 2023-04-02 ASSESSMENT — ENCOUNTER SYMPTOMS
PARESTHESIAS: 0
HEADACHES: 0
EYE PAIN: 0
PALPITATIONS: 0
DIARRHEA: 0
BREAST MASS: 0
CHILLS: 0
SHORTNESS OF BREATH: 0
DYSURIA: 0
NERVOUS/ANXIOUS: 0
FEVER: 0
MYALGIAS: 1
WEAKNESS: 0
DIZZINESS: 0
ARTHRALGIAS: 1
HEARTBURN: 0
FREQUENCY: 1
NAUSEA: 0
COUGH: 0
JOINT SWELLING: 1
HEMATOCHEZIA: 0
SORE THROAT: 0
HEMATURIA: 0
CONSTIPATION: 1
ABDOMINAL PAIN: 0

## 2023-04-03 ENCOUNTER — OFFICE VISIT (OUTPATIENT)
Dept: INTERNAL MEDICINE | Facility: CLINIC | Age: 43
End: 2023-04-03
Payer: COMMERCIAL

## 2023-04-03 VITALS
HEART RATE: 95 BPM | HEIGHT: 66 IN | BODY MASS INDEX: 23.59 KG/M2 | TEMPERATURE: 98.7 F | WEIGHT: 146.8 LBS | DIASTOLIC BLOOD PRESSURE: 72 MMHG | SYSTOLIC BLOOD PRESSURE: 100 MMHG | OXYGEN SATURATION: 98 % | RESPIRATION RATE: 16 BRPM

## 2023-04-03 DIAGNOSIS — Z12.4 CERVICAL CANCER SCREENING: ICD-10-CM

## 2023-04-03 DIAGNOSIS — D32.9 MENINGIOMA OF RIGHT SPHENOID WING INVOLVING CAVERNOUS SINUS (H): ICD-10-CM

## 2023-04-03 DIAGNOSIS — F19.20 CHEMICAL DEPENDENCY (H): ICD-10-CM

## 2023-04-03 DIAGNOSIS — Z00.01 ENCOUNTER FOR ROUTINE ADULT MEDICAL EXAM WITH ABNORMAL FINDINGS: Primary | ICD-10-CM

## 2023-04-03 DIAGNOSIS — F17.200 TOBACCO USE DISORDER: ICD-10-CM

## 2023-04-03 DIAGNOSIS — Z12.31 ENCOUNTER FOR SCREENING MAMMOGRAM FOR BREAST CANCER: ICD-10-CM

## 2023-04-03 DIAGNOSIS — M19.90 ARTHRITIS: ICD-10-CM

## 2023-04-03 DIAGNOSIS — M70.61 TROCHANTERIC BURSITIS OF RIGHT HIP: ICD-10-CM

## 2023-04-03 LAB
ALBUMIN SERPL BCG-MCNC: 4.4 G/DL (ref 3.5–5.2)
ALBUMIN UR-MCNC: NEGATIVE MG/DL
ALP SERPL-CCNC: 41 U/L (ref 35–104)
ALT SERPL W P-5'-P-CCNC: 12 U/L (ref 10–35)
ANION GAP SERPL CALCULATED.3IONS-SCNC: 10 MMOL/L (ref 7–15)
APPEARANCE UR: CLEAR
AST SERPL W P-5'-P-CCNC: 24 U/L (ref 10–35)
BACTERIA #/AREA URNS HPF: ABNORMAL /HPF
BILIRUB SERPL-MCNC: 0.4 MG/DL
BILIRUB UR QL STRIP: NEGATIVE
BUN SERPL-MCNC: 9.9 MG/DL (ref 6–20)
CALCIUM SERPL-MCNC: 8.9 MG/DL (ref 8.6–10)
CHLORIDE SERPL-SCNC: 107 MMOL/L (ref 98–107)
CHOLEST SERPL-MCNC: 191 MG/DL
COLOR UR AUTO: YELLOW
CREAT SERPL-MCNC: 0.69 MG/DL (ref 0.51–0.95)
CRP SERPL-MCNC: <3 MG/L
DEPRECATED HCO3 PLAS-SCNC: 23 MMOL/L (ref 22–29)
ERYTHROCYTE [DISTWIDTH] IN BLOOD BY AUTOMATED COUNT: 13 % (ref 10–15)
ERYTHROCYTE [SEDIMENTATION RATE] IN BLOOD BY WESTERGREN METHOD: 9 MM/HR (ref 0–20)
GFR SERPL CREATININE-BSD FRML MDRD: >90 ML/MIN/1.73M2
GLUCOSE SERPL-MCNC: 84 MG/DL (ref 70–99)
GLUCOSE UR STRIP-MCNC: NEGATIVE MG/DL
HCT VFR BLD AUTO: 38.4 % (ref 35–47)
HDLC SERPL-MCNC: 80 MG/DL
HGB BLD-MCNC: 12.6 G/DL (ref 11.7–15.7)
HGB UR QL STRIP: ABNORMAL
KETONES UR STRIP-MCNC: NEGATIVE MG/DL
LDLC SERPL CALC-MCNC: 98 MG/DL
LEUKOCYTE ESTERASE UR QL STRIP: NEGATIVE
MCH RBC QN AUTO: 31.5 PG (ref 26.5–33)
MCHC RBC AUTO-ENTMCNC: 32.8 G/DL (ref 31.5–36.5)
MCV RBC AUTO: 96 FL (ref 78–100)
NITRATE UR QL: NEGATIVE
NONHDLC SERPL-MCNC: 111 MG/DL
PH UR STRIP: 6.5 [PH] (ref 5–7)
PLATELET # BLD AUTO: 276 10E3/UL (ref 150–450)
POTASSIUM SERPL-SCNC: 4 MMOL/L (ref 3.4–5.3)
PROT SERPL-MCNC: 7.1 G/DL (ref 6.4–8.3)
RBC # BLD AUTO: 4 10E6/UL (ref 3.8–5.2)
RBC #/AREA URNS AUTO: ABNORMAL /HPF
SODIUM SERPL-SCNC: 140 MMOL/L (ref 136–145)
SP GR UR STRIP: <=1.005 (ref 1–1.03)
SQUAMOUS #/AREA URNS AUTO: ABNORMAL /LPF
TRIGL SERPL-MCNC: 67 MG/DL
URATE SERPL-MCNC: 2.1 MG/DL (ref 2.4–5.7)
UROBILINOGEN UR STRIP-ACNC: 0.2 E.U./DL
WBC # BLD AUTO: 6.7 10E3/UL (ref 4–11)
WBC #/AREA URNS AUTO: ABNORMAL /HPF

## 2023-04-03 PROCEDURE — 85652 RBC SED RATE AUTOMATED: CPT | Performed by: INTERNAL MEDICINE

## 2023-04-03 PROCEDURE — 86039 ANTINUCLEAR ANTIBODIES (ANA): CPT | Performed by: INTERNAL MEDICINE

## 2023-04-03 PROCEDURE — 99396 PREV VISIT EST AGE 40-64: CPT | Performed by: INTERNAL MEDICINE

## 2023-04-03 PROCEDURE — 81001 URINALYSIS AUTO W/SCOPE: CPT | Performed by: INTERNAL MEDICINE

## 2023-04-03 PROCEDURE — 86038 ANTINUCLEAR ANTIBODIES: CPT | Performed by: INTERNAL MEDICINE

## 2023-04-03 PROCEDURE — 84550 ASSAY OF BLOOD/URIC ACID: CPT | Performed by: INTERNAL MEDICINE

## 2023-04-03 PROCEDURE — 80053 COMPREHEN METABOLIC PANEL: CPT | Performed by: INTERNAL MEDICINE

## 2023-04-03 PROCEDURE — 36415 COLL VENOUS BLD VENIPUNCTURE: CPT | Performed by: INTERNAL MEDICINE

## 2023-04-03 PROCEDURE — 80061 LIPID PANEL: CPT | Performed by: INTERNAL MEDICINE

## 2023-04-03 PROCEDURE — 87624 HPV HI-RISK TYP POOLED RSLT: CPT | Performed by: INTERNAL MEDICINE

## 2023-04-03 PROCEDURE — 86431 RHEUMATOID FACTOR QUANT: CPT | Performed by: INTERNAL MEDICINE

## 2023-04-03 PROCEDURE — G0145 SCR C/V CYTO,THINLAYER,RESCR: HCPCS | Performed by: INTERNAL MEDICINE

## 2023-04-03 PROCEDURE — 85027 COMPLETE CBC AUTOMATED: CPT | Performed by: INTERNAL MEDICINE

## 2023-04-03 PROCEDURE — 86140 C-REACTIVE PROTEIN: CPT | Performed by: INTERNAL MEDICINE

## 2023-04-03 PROCEDURE — 86200 CCP ANTIBODY: CPT | Performed by: INTERNAL MEDICINE

## 2023-04-03 ASSESSMENT — ENCOUNTER SYMPTOMS
NERVOUS/ANXIOUS: 0
ARTHRALGIAS: 1
NAUSEA: 0
HEADACHES: 0
EYE PAIN: 0
SHORTNESS OF BREATH: 0
BREAST MASS: 0
PARESTHESIAS: 0
WEAKNESS: 0
PALPITATIONS: 0
HEMATURIA: 0
CHILLS: 0
CONSTIPATION: 1
FREQUENCY: 1
HEMATOCHEZIA: 0
COUGH: 0
MYALGIAS: 1
FEVER: 0
SORE THROAT: 0
HEARTBURN: 0
DYSURIA: 0
DIARRHEA: 0
DIZZINESS: 0
JOINT SWELLING: 1
ABDOMINAL PAIN: 0

## 2023-04-03 NOTE — PROGRESS NOTES
SUBJECTIVE:   CC: Blas is an 42 year old who presents for preventive health visit.   Patient has been advised of split billing requirements and indicates understanding: Yes  Healthy Habits:     Getting at least 3 servings of Calcium per day:  Yes    Bi-annual eye exam:  Yes    Dental care twice a year:  Yes    Sleep apnea or symptoms of sleep apnea:  Daytime drowsiness    Diet:  Regular (no restrictions)    Frequency of exercise:  6-7 days/week    Duration of exercise:  45-60 minutes    Taking medications regularly:  Yes    Medication side effects:  None    PHQ-2 Total Score: 0    Additional concerns today:  No              Today's PHQ-2 Score:       4/2/2023    12:09 PM   PHQ-2 ( 1999 [employer])   Q1: Little interest or pleasure in doing things 0   Q2: Feeling down, depressed or hopeless 0   PHQ-2 Score 0   Q1: Little interest or pleasure in doing things Not at all   Q2: Feeling down, depressed or hopeless Not at all   PHQ-2 Score 0       Have you ever done Advance Care Planning? (For example, a Health Directive, POLST, or a discussion with a medical provider or your loved ones about your wishes): No, advance care planning information given to patient to review.  Patient declined advance care planning discussion at this time.    Social History     Tobacco Use     Smoking status: Some Days     Packs/day: 0.25     Years: 10.00     Pack years: 2.50     Types: Cigarettes     Start date: 12/20/1997     Smokeless tobacco: Never   Vaping Use     Vaping status: Never Used   Substance Use Topics     Alcohol use: No     Comment: daily x 5 years-at least a bottle of wine   11/19/13 No alcohol use            4/2/2023    12:08 PM   Alcohol Use   Prescreen: >3 drinks/day or >7 drinks/week? Not Applicable     Reviewed orders with patient.  Reviewed health maintenance and updated orders accordingly - Yes  Labs reviewed in EPIC    Breast Cancer Screening:    FHS-7:        View : No data to display.                Mammogram  Screening - Offered annual screening and updated Health Maintenance for mutual plan based on risk factor consideration    Pertinent mammograms are reviewed under the imaging tab.    History of abnormal Pap smear: NO - age 30-65 PAP every 5 years with negative HPV co-testing recommended      Latest Ref Rng & Units 1/2/2018    11:20 AM 1/2/2018    11:16 AM 10/21/2013    12:00 AM   PAP / HPV   PAP (Historical)   NIL   NIL     HPV 16 DNA NEG^Negative Negative       HPV 18 DNA NEG^Negative Negative       Other HR HPV NEG^Negative Negative         Reviewed and updated as needed this visit by clinical staff   Tobacco  Allergies               Reviewed and updated as needed this visit by Provider                     Review of Systems   Constitutional: Negative for chills and fever.   HENT: Negative for congestion, ear pain, hearing loss and sore throat.    Eyes: Negative for pain and visual disturbance.   Respiratory: Negative for cough and shortness of breath.    Cardiovascular: Negative for chest pain, palpitations and peripheral edema.   Gastrointestinal: Positive for constipation. Negative for abdominal pain, diarrhea, heartburn, hematochezia and nausea.   Breasts:  Negative for tenderness, breast mass and discharge.   Genitourinary: Positive for frequency. Negative for dysuria, genital sores, hematuria, pelvic pain, urgency, vaginal bleeding and vaginal discharge.   Musculoskeletal: Positive for arthralgias, joint swelling and myalgias.   Skin: Negative for rash.   Neurological: Negative for dizziness, weakness, headaches and paresthesias.   Psychiatric/Behavioral: Negative for mood changes. The patient is not nervous/anxious.        No sore throat now with Amoxicillin   Urine frequency. Has coffee 2-3 cups, Not bothersome. No dysuria  Soreness right trochanter bursa area.  Hurts lying on right side. Has some stiffness in general. Om has psoriatic arthriutis AND mgm HAS rheumatoid arthritis   Works as   Stable  "mild constipation. Not bothersome. BM every 3 days. No abd pain  Due for repeat MRI brain Summer 2023 with neurosurgery  for f/u meningioma.  Being ordered through neurosurgery per patient  Mild intermittent fatigue.  Patient denies any snoring issues    OBJECTIVE:   /72   Pulse 95   Temp 98.7  F (37.1  C) (Tympanic)   Resp 16   Ht 1.683 m (5' 6.25\")   Wt 66.6 kg (146 lb 12.8 oz)   LMP 03/07/2023   SpO2 98%   BMI 23.52 kg/m    Physical Exam  General appearance - healthy, alert, no distress  Skin - No rashes or lesions.  Head - normocephalic, atraumatic  Eyes - LEANDRA, EOMI, fundi exam with nondilated pupils negative.  Ears - External ears normal. Canals clear. TM's normal.  Nose/Sinuses - Nares normal. Septum midline. Mucosa normal. No drainage or sinus tenderness.  Oropharynx - No erythema, no adenopathy, no exudates.  Neck - Supple without adenopathy or thyromegaly. No bruits.  Lungs - Clear to auscultation without wheezes/rhonchi.  Heart - Regular rate and rhythm without murmurs, clicks, or gallops.  Nodes - No supraclavicular, axillary, or inguinal adenopathy palpable.  Breasts - No masses or tenderness palpable bilaterally. No nipple discharge to palpation  Abdomen - Abdomen soft, non-tender. BS normal. No masses or hepatosplenomegaly palpable. No bruits.  Extremities -No cyanosis, clubbing or edema.    Musculoskeletal - Spine ROM normal. Muscular strength intact.  Mild tenderness palpation right trochanter bursa.  No tenderness to internal/external rotation or flexion/extension right hip joint.  Minimal thickening to DIP joints of hands bilaterally.  No increased warmth or erythema.  Peripheral pulses - radial=4/4, femoral=4/4, posterior tibial=4/4, dorsalis pedis=4/4,  Neuro - Gait normal. Reflexes normal and symmetric. Sensation grossly WNL.  Genital - Normal-appearing female external genitalia. No cervical erythema or discharge. No cervical motion tenderness. Pap smear obtained in the usual " fashion without complication.  Patient was offered but declined necessity for nurse to be present during exam.  Bimanual examination revealed the uterus to be normal in size without masses palpable. No adnexal masses or tenderness appreciated.  Rectal - deferred      ASSESSMENT/PLAN:   1. Encounter for routine adult medical exam with abnormal findings  Screening labs as ordered.  Recommend COVID booster once patient has completed antibiotic therapy for recurrent streptococcal throat infection.  Breast cancer screening as below.  Pap smear obtained today without complication.  Due for colon cancer screening in 1 year  - Comprehensive metabolic panel; Future  - Lipid panel reflex to direct LDL Fasting; Future  - CBC with platelets; Future  - UA with Microscopic reflex to Culture - lab collect; Future  - Comprehensive metabolic panel  - Lipid panel reflex to direct LDL Fasting  - CBC with platelets  - UA with Microscopic reflex to Culture - lab collect  - UA Microscopic with Reflex to Culture    2. Chemical dependency (H)  Patient remains sober since 2013.  Congratulated patient on nearly 10 years of sobriety    3. 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  Previous surgical management.  Patient will have follow-up MRI of the brain this summer with neurosurgery    4. Trochanteric bursitis of right hip  No tenderness right hip joint with range of motion.  See plan discussion below for management.  If not improving, patient will let me know and will refer to Ortho for possible cortisone injection    5. Tobacco use disorder  Smoking 5 to 6 cigarettes/day.  Counseled regarding smoking cessation.  Patient states she has some nicotine patches at home that she may use motivated    6. Arthritis  Mild arthritis soreness in the a.m. with joints.  Family history of psoriatic and rheumatoid arthritis.  Patient denies any skin rash issues.  Lab evaluation as below.  May use oral  Tylenol or topical Voltaren as needed  - Rheumatoid factor; Future  - Uric acid; Future  - Erythrocyte sedimentation rate auto; Future  - CRP inflammation; Future  - Anti Nuclear Arina IgG by IFA with Reflex; Future  - Cyclic Citrullinated Peptide Antibody IgG; Future  - Rheumatoid factor  - Uric acid  - Erythrocyte sedimentation rate auto  - CRP inflammation  - Anti Nuclear Arina IgG by IFA with Reflex  - Cyclic Citrullinated Peptide Antibody IgG    7. Cervical cancer screening  Candidate for Pap smear screening today.  Sample obtained without complication  - Pap Screen with HPV - recommended age 30 - 65 years    8. Encounter for screening mammogram for breast cancer  Due for mammogram breast cancer screening.  Asymptomatic  - *MA Screening Digital Bilateral; Future      Patient has been advised of split billing requirements and indicates understanding: Yes      COUNSELING:  Reviewed preventive health counseling, as reflected in patient instructions        She reports that she has been smoking cigarettes. She started smoking about 25 years ago. She has a 2.50 pack-year smoking history. She has never used smokeless tobacco.  Nicotine/Tobacco Cessation Plan:   Information offered: Patient not interested at this time -but will consider possible nicotine patch use which she has at home      PLAN:  Labs today as ordered  I encourage you to stop all smoking.  If  willing to quit in the future,   contact  [employer] toll-free number (3-994-QUIT-NOW) or through the internet  ([employer].com) for free nicotine supplementation treatment and/or counseling  Mammogram. This will be done at the [facility]. Call [phone] or use [employer] to schedule.    I would recommend a covid booster vaccination anytime after done with antibiotics. You may have it done at any pharmacy. If you wish to have it done at a [place] pharmacy, then go to [email or web address] to schedule a vaccination appointment  Stretching  exercises in a.m. for right trochanter bursa (right foot on left knee and bring right leg inward)  Icing to the sore bursa area as needed.  May also use over-the-counter Voltaren gel as an anti-inflammatory applied to the area as needed     Dong Antonio MD  [facility]

## 2023-04-03 NOTE — PATIENT INSTRUCTIONS
Labs today as ordered  I encourage you to stop all smoking.  If  willing to quit in the future,   contact  [employer] toll-free number (6-187-QUIT-NOW) or through the internet  (quitpartnermn.com) for free nicotine supplementation treatment and/or counseling  Mammogram. This will be done at the [facility]. Call [phone] or use [employer] to schedule.    I would recommend a covid booster vaccination anytime after done with antibiotics. You may have it done at any pharmacy. If you wish to have it done at a [facility] pharmacy, then go to [email or web address] to schedule a vaccination appointment  Stretching exercises in a.m. for right trochanter bursa (right foot on left knee and bring right leg inward)  Icing to the sore bursa area as needed.  May also use over-the-counter Voltaren gel as an anti-inflammatory applied to the area as needed

## 2023-04-04 LAB — RHEUMATOID FACT SER NEPH-ACNC: <7 IU/ML

## 2023-04-05 LAB
ANA PAT SER IF-IMP: ABNORMAL
ANA SER QL IF: ABNORMAL
ANA TITR SER IF: ABNORMAL {TITER}
BKR LAB AP GYN ADEQUACY: NORMAL
BKR LAB AP GYN INTERPRETATION: NORMAL
BKR LAB AP HPV REFLEX: NORMAL
BKR LAB AP LMP: NORMAL
BKR LAB AP PREVIOUS ABNORMAL: NORMAL
PATH REPORT.COMMENTS IMP SPEC: NORMAL
PATH REPORT.COMMENTS IMP SPEC: NORMAL
PATH REPORT.RELEVANT HX SPEC: NORMAL

## 2023-04-06 LAB — CCP AB SER IA-ACNC: 0.4 U/ML

## 2023-04-07 LAB
HUMAN PAPILLOMA VIRUS 16 DNA: NEGATIVE
HUMAN PAPILLOMA VIRUS 18 DNA: NEGATIVE
HUMAN PAPILLOMA VIRUS FINAL DIAGNOSIS: NORMAL
HUMAN PAPILLOMA VIRUS OTHER HR: NEGATIVE

## 2023-04-18 ENCOUNTER — MYC MEDICAL ADVICE (OUTPATIENT)
Dept: INTERNAL MEDICINE | Facility: CLINIC | Age: 43
End: 2023-04-18
Payer: COMMERCIAL

## 2023-04-18 DIAGNOSIS — R76.8 ELEVATED ANTINUCLEAR ANTIBODY (ANA) LEVEL: Primary | ICD-10-CM

## 2023-04-18 DIAGNOSIS — R31.29 MICROSCOPIC HEMATURIA: ICD-10-CM

## 2023-04-21 ENCOUNTER — ANCILLARY PROCEDURE (OUTPATIENT)
Dept: MAMMOGRAPHY | Facility: CLINIC | Age: 43
End: 2023-04-21
Attending: INTERNAL MEDICINE
Payer: COMMERCIAL

## 2023-04-21 DIAGNOSIS — Z12.31 ENCOUNTER FOR SCREENING MAMMOGRAM FOR BREAST CANCER: ICD-10-CM

## 2023-04-21 DIAGNOSIS — Z12.31 VISIT FOR SCREENING MAMMOGRAM: ICD-10-CM

## 2023-04-21 PROCEDURE — 77063 BREAST TOMOSYNTHESIS BI: CPT | Mod: TC | Performed by: RADIOLOGY

## 2023-04-21 PROCEDURE — 77067 SCR MAMMO BI INCL CAD: CPT | Mod: TC | Performed by: RADIOLOGY

## 2023-10-03 ENCOUNTER — ANCILLARY PROCEDURE (OUTPATIENT)
Dept: GENERAL RADIOLOGY | Facility: CLINIC | Age: 43
End: 2023-10-03
Attending: FAMILY MEDICINE
Payer: COMMERCIAL

## 2023-10-03 ENCOUNTER — OFFICE VISIT (OUTPATIENT)
Dept: ORTHOPEDICS | Facility: CLINIC | Age: 43
End: 2023-10-03
Payer: COMMERCIAL

## 2023-10-03 ENCOUNTER — PRE VISIT (OUTPATIENT)
Dept: ORTHOPEDICS | Facility: CLINIC | Age: 43
End: 2023-10-03

## 2023-10-03 DIAGNOSIS — M25.559 HIP PAIN: ICD-10-CM

## 2023-10-03 DIAGNOSIS — M25.551 CHRONIC PAIN OF RIGHT HIP: Primary | ICD-10-CM

## 2023-10-03 DIAGNOSIS — G89.29 CHRONIC PAIN OF RIGHT HIP: Primary | ICD-10-CM

## 2023-10-03 PROCEDURE — 73502 X-RAY EXAM HIP UNI 2-3 VIEWS: CPT | Mod: RT | Performed by: RADIOLOGY

## 2023-10-03 PROCEDURE — 99204 OFFICE O/P NEW MOD 45 MIN: CPT | Performed by: FAMILY MEDICINE

## 2023-10-03 RX ORDER — DICLOFENAC SODIUM 75 MG/1
75 TABLET, DELAYED RELEASE ORAL 2 TIMES DAILY
Qty: 30 TABLET | Refills: 0 | Status: SHIPPED | OUTPATIENT
Start: 2023-10-03 | End: 2023-10-24

## 2023-10-03 NOTE — LETTER
10/3/2023      RE: Blas Perkins  [address] [facility] [address]     Dear Colleague,    Thank you for referring your patient, Blas Perkins, to the [facility] [place]. Please see a copy of my visit note below.    CHIEF COMPLAINT:  Pain of the Right Hip       HISTORY OF PRESENT ILLNESS  Ms. Perkins is a pleasant 42 year old year old female who presents to clinic today with right hip pain.  Blas explains that right hip pain has been present for 9 months. In April 2023 she saw her PCP and was told to take Tylenol and stretches and if not improving would provide referral to us. The patient reports that she only had minimal and short term relief from the stretches and Tylenol. Denies any acute trauma. The pain is lcoated over the lateral aspect of the hip and wraps to groin at times. Denies radiating pain below the knee or paresthesias. Pain awakens her from sleeping. 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 Pain is worse when being sedentary.     No pain laying on side.     Onset: gradual 9 months  Location: right hip  Quality:  aching  Duration: 9 months   Severity: 4/10 at worst  Timing:intermittent episodes   Modifying factors:  resting and non-use makes it better, movement and use makes it worse  Associated signs & symptoms: pain  Previous similar pain: No  Treatments to date:Stretches and Tylenol, voltaren gel course per PCP    Additional history: as documented    Review of Systems:  Have you recently had a a fever, chills, weight loss? No  Do you have any vision problems? No  Do you have any chest pain or edema? No  Do you have any shortness of breath or wheezing?  No  Do you have stomach problems? No  Do you have any numbness or focal weakness? No  Do you have diabetes? No  Do you have problems with bleeding or clotting? No  Do you have an rashes or other skin lesions? 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       Allergies   Allergen Reactions    Chantix [Varenicline]      Increased \"crabbiness\"       Family History   Problem Relation Age of Onset    Family History Negative Mother     Substance Abuse Mother     Family History Negative Father     Family History Negative Sister     Family History Negative Brother     Diabetes Maternal Grandfather     Substance Abuse Maternal Grandfather     Substance Abuse Maternal Grandmother     Unknown/Adopted Paternal Grandmother     Unknown/Adopted Paternal Grandfather     Glaucoma No family hx of     Macular Degeneration No family hx of        Additional medical/Social/Surgical histories reviewed in EPIC and updated as appropriate.       PHYSICAL EXAM  There were no vitals taken for this visit.    General  - normal appearance, in no obvious distress  HEENT  -Pupils equal, round, no conjunctival injection.  No lid lag  CV  - normal peripheral perfusion  Pulm  - normal respiratory pattern, non-labored  Musculoskeletal - lumbar spine  - stance: Normal gait and stance  - inspection: normal bone and joint alignment, no obvious scoliosis  - palpation: Nontender palpation  - ROM: Full painless range of motion  - strength: lower extremities 5/5 in all planes  - special tests:  (-) straight leg raise bilaterally  (-) slump test  MUSCULOSKELETAL:  Gait: Symmetric gait with normal stride length, step length, and najma  Palpation: No tenderness to palpation of hip flexors, greater trochanter, " gluteus medius, pyriformis or ischial tuberosity  Strength: 5/5 strength with flexion, extension, abduction and adduction.   ROM: Patient painful with terminal flexion>90 and  external rotation of the hip terminally to 60+ degrees.  Mild pain with IR.  Abduction and abduction within normal limits  Muscle Strength:    Neurologic: Normal sensation of lower extremities    Special Tests:  (-) Straight Leg Raise (SLR)  (-) Log Roll  (-) Trendelenberg Test  (-) Phil's test  (-) FADIR  (+) ONEIDA anterolateral hip    IMAGING : Right Hip XR. Final results and radiologist's interpretation, available in the [facility] health record. Images were reviewed with the patient/family members in the office today. My personal interpretation of the performed imaging is no acute osseous abnormality or significant degenerative changes of joint.       ASSESSMENT & PLAN  Ms. Perkins is a 42 year old year old female who presents to clinic today with right hip pain that began 9 months ago and has worsened over the past few months. Physical exam significant for pain with flexion and external rotation of the hip.     Greatest concern for intraarticular cause for hip pain, pain with terminal flexion, IR and ER.  No TTP trochanteric region. Less likely lumbar radiculitis.    Diagnosis: Chronic pain of right hip    - Start diclofenac BID for symptomatic relief  - MRI hip right w/o contrast  - May utilize tylenol with diclofenac as needed  -Follow up via phone/virtual visit to discuss results of MRI    It was a pleasure seeing Blas today.    Maikel Burk, MS3    Quoc Rodas DO, [facility]  Primary Care Sports Medicine    I, Quoc Rodas, was present with the medical student Maikel Burk MS3 who participated in the service and in the documentation of the note.  I have verified the history and personally performed the physical exam and medical decision making.  I agree with the assessment and plan of care as documented in the note.  Certain aspects of note  are altered reflect appropriate assessment and plan.    Items personally reviewed: imaging and agree with the interpretation documented in the note.    Quoc Rodas, DO

## 2023-10-03 NOTE — PROGRESS NOTES
CHIEF COMPLAINT:  Pain of the Right Hip       HISTORY OF PRESENT ILLNESS  Ms. Perkins is a pleasant 42 year old year old female who presents to clinic today with right hip pain.  Blas explains that right hip pain has been present for 9 months. In April 2023 she saw her PCP and was told to take Tylenol and stretches and if not improving would provide referral to us. The patient reports that she only had minimal and short term relief from the stretches and Tylenol. Denies any acute trauma. The pain is lcoated over the lateral aspect of the hip and wraps to groin at times. Denies radiating pain below the knee or paresthesias. Pain awakens her from sleeping. 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 Pain is worse when being sedentary.     No pain laying on side.     Onset: gradual 9 months  Location: right hip  Quality:  aching  Duration: 9 months   Severity: 4/10 at worst  Timing:intermittent episodes   Modifying factors:  resting and non-use makes it better, movement and use makes it worse  Associated signs & symptoms: pain  Previous similar pain: No  Treatments to date:Stretches and Tylenol, voltaren gel course per PCP    Additional history: as documented    Review of Systems:  Have you recently had a a fever, chills, weight loss? No  Do you have any vision problems? No  Do you have any chest pain or edema? No  Do you have any shortness of breath or wheezing?  No  Do you have stomach problems? No  Do you have any numbness or focal weakness? No  Do you have diabetes? No  Do you have problems with bleeding or clotting? No  Do you have an rashes or other skin lesions? 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       Allergies   Allergen Reactions    Chantix [Varenicline]      Increased \"crabbiness\"       Family History   Problem Relation Age of Onset    Family History Negative Mother     Substance Abuse Mother     Family History Negative Father     Family History Negative Sister     Family History Negative Brother     Diabetes Maternal Grandfather     Substance Abuse Maternal Grandfather     Substance Abuse Maternal Grandmother     Unknown/Adopted Paternal Grandmother     Unknown/Adopted Paternal Grandfather     Glaucoma No family hx of     Macular Degeneration No family hx of        Additional medical/Social/Surgical histories reviewed in [facility] and updated as appropriate.       PHYSICAL EXAM  There were no vitals taken for this visit.    General  - normal appearance, in no obvious distress  HEENT  -Pupils equal, round, no conjunctival injection.  No lid lag  CV  - normal peripheral perfusion  Pulm  - normal respiratory pattern, non-labored  Musculoskeletal - lumbar spine  - stance: Normal gait and stance  - inspection: normal bone and joint alignment, no obvious scoliosis  - palpation: Nontender palpation  - ROM: Full painless range of motion  - strength: lower extremities 5/5 in all planes  - special tests:  (-) straight leg raise bilaterally  (-) slump test  MUSCULOSKELETAL:  Gait: Symmetric gait with normal stride length, step length, and najma  Palpation: No tenderness to palpation of hip flexors, greater trochanter, gluteus medius, pyriformis or ischial tuberosity  Strength: 5/5 strength with flexion, extension, abduction and adduction.   ROM: Patient painful with terminal flexion>90 and  external rotation of the hip terminally to 60+ degrees.  Mild pain with IR.  Abduction and abduction " within normal limits  Muscle Strength:    Neurologic: Normal sensation of lower extremities    Special Tests:  (-) Straight Leg Raise (SLR)  (-) Log Roll  (-) Trendelenberg Test  (-) Phil's test  (-) FADIR  (+) ONEIDA anterolateral hip    IMAGING : Right Hip XR. Final results and radiologist's interpretation, available in the [facility] health record. Images were reviewed with the patient/family members in the office today. My personal interpretation of the performed imaging is no acute osseous abnormality. Small spurring femoral head seen on AP view.    ASSESSMENT & PLAN  Ms. Perkins is a 42 year old year old female who presents to clinic today with right hip pain that began 9 months ago and has worsened over the past few months.     Greatest concern for intraarticular cause for hip pain and perhaps greater DJD than small spurring sugests, pain with terminal flexion, IR and ER.  No TTP trochanteric region. Less likely lumbar radiculitis.    Diagnosis: Chronic pain of right hip    - Start diclofenac BID for symptomatic relief  - MRI hip right w/o contrast  - May utilize tylenol with diclofenac as needed  -Follow up via phone/virtual visit to discuss results of MRI    It was a pleasure seeing Blas today.    Maikel Burk, MS3    Quoc Rodas DO, [facility]  Primary Care Sports Medicine    I, Quoc Rodas, was present with the medical student Maikel Burk, MS3 who participated in the service and in the documentation of the note.  I have verified the history and personally performed the physical exam and medical decision making.  I agree with the assessment and plan of care as documented in the note.  Certain aspects of note are altered reflect appropriate assessment and plan.    Items personally reviewed: imaging and agree with the interpretation documented in the note.    Quoc Rodas DO

## 2023-10-03 NOTE — TELEPHONE ENCOUNTER
DIAGNOSIS: (R) Hip Pain and Bursitis / self ref / [facility] / no images    APPOINTMENT DATE: 10/03/23   NOTES STATUS DETAILS   OFFICE NOTE from referring provider N/A Self

## 2023-10-06 ENCOUNTER — HOSPITAL ENCOUNTER (OUTPATIENT)
Dept: MRI IMAGING | Facility: CLINIC | Age: 43
Discharge: HOME | End: 2023-10-06
Attending: FAMILY MEDICINE | Admitting: FAMILY MEDICINE
Payer: COMMERCIAL

## 2023-10-06 DIAGNOSIS — G89.29 CHRONIC PAIN OF RIGHT HIP: ICD-10-CM

## 2023-10-06 DIAGNOSIS — M25.551 CHRONIC PAIN OF RIGHT HIP: ICD-10-CM

## 2023-10-06 PROCEDURE — 73721 MRI JNT OF LWR EXTRE W/O DYE: CPT | Mod: RT

## 2023-10-06 PROCEDURE — 73721 MRI JNT OF LWR EXTRE W/O DYE: CPT | Mod: 26 | Performed by: RADIOLOGY

## 2023-10-24 ENCOUNTER — TELEPHONE (OUTPATIENT)
Dept: ORTHOPEDICS | Facility: CLINIC | Age: 43
End: 2023-10-24

## 2023-10-24 ENCOUNTER — VIRTUAL VISIT (OUTPATIENT)
Dept: ORTHOPEDICS | Facility: CLINIC | Age: 43
End: 2023-10-24
Payer: COMMERCIAL

## 2023-10-24 DIAGNOSIS — M25.851 FEMOROACETABULAR IMPINGEMENT OF RIGHT HIP: Primary | ICD-10-CM

## 2023-10-24 DIAGNOSIS — M25.551 CHRONIC PAIN OF RIGHT HIP: ICD-10-CM

## 2023-10-24 DIAGNOSIS — G89.29 CHRONIC PAIN OF RIGHT HIP: ICD-10-CM

## 2023-10-24 PROCEDURE — 99214 OFFICE O/P EST MOD 30 MIN: CPT | Mod: VID | Performed by: FAMILY MEDICINE

## 2023-10-24 RX ORDER — DICLOFENAC SODIUM 75 MG/1
75 TABLET, DELAYED RELEASE ORAL 2 TIMES DAILY
Qty: 30 TABLET | Refills: 0 | Status: SHIPPED | OUTPATIENT
Start: 2023-10-24 | End: 2024-07-29

## 2023-10-24 NOTE — PROGRESS NOTES
VIRTUAL VIDEO VISIT:  No chief complaint on file.     How would you like to obtain your AVS? MyChart  If the video visit is dropped, the invitation should be resent by: Text to cell phone: [phone]  Will anyone else be joining your video visit? No  \    HISTORY OF PRESENT ILLNESS  Ms. Perkins is a pleasant 42 year old year old female who presents virtually today for hip MRI results    Date of injury: NA  Date last seen: 10/3/23  Following Therapeutic Plan: MRI  Pain: Unchanged  Function: Unchanged  Interval History: Stated that the tylenol and diclofenac were slightly helpful but did not impact the symptoms greatly.       Additional medical/Social/Surgical histories reviewed in [facility] and updated as appropriate.    REVIEW OF SYSTEMS (10/24/2023)  CONSTITUTIONAL: Denies fever and weight loss  GASTROINTESTINAL: Denies abdominal pain, nausea, vomiting  MUSCULOSKELETAL: See HPI  SKIN: Denies any recent rash or lesion  NEUROLOGICAL: Denies numbness or focal weakness    PHYSICAL EXAMINATION  General Appearance: Well appearing, alert, in no acute distress, well-hydrated, and well nourished  ENT: Pupils equal, round, no conjunctival injection.  No lid lag  Cardiovascular: no signs of lower extremity edema  Respiratory: no respiratory distress, no audible wheezing, no labored breathing, symmetric thoracic excursion  Psychiatric: mood and affect are appropriate, patient is oriented to time, place and person  Musculoskeletal: Deferred  Skin: No rashes, lesions, or ecchymosis present    IMAGING :   MR HIP RIGHT WO CON    Narrative & Impression   Exam: MRI of the right hip dated 10/6/2023.     COMPARISON: Radiographs dated 10/3/2023.     CLINICAL HISTORY: Chronic hip pain.     TECHNIQUE: Multiplanar, multisequence MR imaging of the right hip was  obtained using standard sequences in 3 orthogonal planes without the  use of intravenous or intra-articular gadolinium contrast.     FINDINGS:     No significant joint effusion in  the right hip joint.     No marrow signal abnormalities to suggest fracture, osteonecrosis, or  marrow infiltration.     Full-thickness at the femoral head and neck junction, with spurring,  which can be seen in femoroacetabular impingement. Tearing of the  anterior superior labrum. No evidence of full-thickness cartilage loss  or subchondral edema.     No full-thickness tendon tear or tendon retraction. Insertional  tendinopathy with moderate grade partial-thickness tearing of the  gluteus minimus tendon. The muscle bulk is intact without significant  atrophy or soft tissue edema. Mild fatty infiltration within the  anterior gluteus minimus muscle. Sciatic nerve is unremarkable. The  femoral vasculature is intact. No lymphadenopathy.                                                                      IMPRESSION:  1. No significant joint effusion in the right hip joint  2. No marrow signal abnormalities in the right hip to suggest  fracture, osteonecrosis, or marrow infiltration.  3. Fullness at the femoral head and neck junction, with spurring,  which can be associated with femoroacetabular impingement, with  tearing of the anterior superior labrum. No full-thickness cartilage  loss.  4. 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     ERIK MARTINEZ MD       Independent interpretation of imaging and review with patient.     ASSESSMENT & PLAN  Ms. Perkins is a 42 year old year old female who presents virtually today for follow up of MRI right hip and discuss chronic right hip pain present for the past 10 months.     MR revealing symptoms consistent with TAZ, tearing of anterior superior labrum. Additonal gluteus tendinopathy.    Symtoms at groin only most consistent with symptomatic TAZ and labrum tear.    Discussed diagnostic/therapeutic injection, physical therapy and additional surgical options for TAZ  treatments. Start PT and discussed risks, benefits and alternatives to concomitant injection. Blas wishes to pursue injection due to level of pain as well.  Until CSI completed will continue diclofenac, refill provided.    It was a pleasure seeing Blas.    Quoc Rodas DO, JODI  Primary Care Sports Medicine  [facility]    Video-Visit Details    Type of service:  Video Visit    Video Start Time: 0922    Video End Time: 0945    Originating Location (pt. Location): Home    Distant Location (provider location):  [facility] [place]     Platform used for Video Visit: [place] [employer]

## 2023-10-24 NOTE — LETTER
10/24/2023       RE: Blas Perkins  [address] [place] [facility] [address]     Dear Colleague,    Thank you for referring your patient, Blas Perkins, to the [facility] [place] at [facility]. Please see a copy of my visit note below.      VIRTUAL VIDEO VISIT:  No chief complaint on file.     How would you like to obtain your AVS? MyChart  If the video visit is dropped, the invitation should be resent by: Text to cell phone: [phone]  Will anyone else be joining your video visit? No  \    HISTORY OF PRESENT ILLNESS  Ms. Perkins is a pleasant 42 year old year old female who presents virtually today for hip MRI results    Date of injury: NA  Date last seen: 10/3/23  Following Therapeutic Plan: MRI  Pain: Unchanged  Function: Unchanged  Interval History: Stated that the tylenol and diclofenac were slightly helpful but did not impact the symptoms greatly.       Additional medical/Social/Surgical histories reviewed in [facility] and updated as appropriate.    REVIEW OF SYSTEMS (10/24/2023)  CONSTITUTIONAL: Denies fever and weight loss  GASTROINTESTINAL: Denies abdominal pain, nausea, vomiting  MUSCULOSKELETAL: See HPI  SKIN: Denies any recent rash or lesion  NEUROLOGICAL: Denies numbness or focal weakness    PHYSICAL EXAMINATION  General Appearance: Well appearing, alert, in no acute distress, well-hydrated, and well nourished  ENT: Pupils equal, round, no conjunctival injection.  No lid lag  Cardiovascular: no signs of lower extremity edema  Respiratory: no respiratory distress, no audible wheezing, no labored breathing, symmetric thoracic excursion  Psychiatric: mood and affect are appropriate, patient is oriented to time, place and person  Musculoskeletal: Deferred  Skin: No rashes, lesions, or ecchymosis present    IMAGING :   MR HIP RIGHT WO CON    Narrative & Impression   Exam: MRI of the right hip dated 10/6/2023.      COMPARISON: Radiographs dated 10/3/2023.     CLINICAL HISTORY: Chronic hip pain.     TECHNIQUE: Multiplanar, multisequence MR imaging of the right hip was  obtained using standard sequences in 3 orthogonal planes without the  use of intravenous or intra-articular gadolinium contrast.     FINDINGS:     No significant joint effusion in the right hip joint.     No marrow signal abnormalities to suggest fracture, osteonecrosis, or  marrow infiltration.     Full-thickness at the femoral head and neck junction, with spurring,  which can be seen in femoroacetabular impingement. Tearing of the  anterior superior labrum. No evidence of full-thickness cartilage loss  or subchondral edema.     No full-thickness tendon tear or tendon retraction. Insertional  tendinopathy with moderate grade partial-thickness tearing of the  gluteus minimus tendon. The muscle bulk is intact without significant  atrophy or soft tissue edema. Mild fatty infiltration within the  anterior gluteus minimus muscle. Sciatic nerve is unremarkable. The  femoral vasculature is intact. No lymphadenopathy.                                                                      IMPRESSION:  1. No significant joint effusion in the right hip joint  2. No marrow signal abnormalities in the right hip to suggest  fracture, osteonecrosis, or marrow infiltration.  3. Fullness at the femoral head and neck junction, with spurring,  which can be associated with femoroacetabular impingement, with  tearing of the anterior superior labrum. No full-thickness cartilage  loss.  4. 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     ERIK MARTINEZ MD       Independent interpretation of imaging and review with patient.     ASSESSMENT & PLAN  Ms. Perkins is a 42 year old year old female who presents virtually today for follow up of MRI right hip and discuss chronic right hip pain  present for the past 10 months.     MR revealing symptoms consistent with TAZ, tearing of anterior superior labrum. Additonal gluteus tendinopathy.    Symtoms at groin only most consistent with symptomatic TAZ and labrum tear.    Discussed diagnostic/therapeutic injection, physical therapy and additional surgical options for TAZ treatments. Start PT and discussed risks, benefits and alternatives to concomitant injection. Blas wishes to pursue injection due to level of pain as well.  Until CSI completed will continue diclofenac, refill provided.    It was a pleasure seeing Blas.    Quoc Rodas DO, CAM  Primary Care Sports Medicine  [facility]

## 2023-11-06 ASSESSMENT — ACTIVITIES OF DAILY LIVING (ADL)
GOING DOWN 1 FLIGHT OF STAIRS: NO DIFFICULTY AT ALL
SITTING FOR 15 MINUTES: NO DIFFICULTY AT ALL
ROLLING OVER IN BED: SLIGHT DIFFICULTY
WALKING_APPROXIMATELY_10_MINUTES: NO DIFFICULTY AT ALL
HOW_WOULD_YOU_RATE_YOUR_CURRENT_LEVEL_OF_FUNCTION_DURING_YOUR_USUAL_ACTIVITIES_OF_DAILY_LIVING_FROM_0_TO_100_WITH_100_BEING_YOUR_LEVEL_OF_FUNCTION_PRIOR_TO_YOUR_HIP_PROBLEM_AND_0_BEING_THE_INABILITY_TO_PERFORM_ANY_OF_YOUR_USUAL_DAILY_ACTIVITIES?: 75
DEEP SQUATTING: NO DIFFICULTY AT ALL
HOS_ADL_SCORE(%): 95.59
STEPPING UP AND DOWN CURBS: NO DIFFICULTY AT ALL
GOING UP 1 FLIGHT OF STAIRS: NO DIFFICULTY AT ALL
WALKING_15_MINUTES_OR_GREATER: NO DIFFICULTY AT ALL
WALKING_INITIALLY: NO DIFFICULTY AT ALL
HOS_ADL_HIGHEST_POTENTIAL_SCORE: 68
STANDING FOR 15 MINUTES: NO DIFFICULTY AT ALL
PUTTING ON SOCKS AND SHOES: NO DIFFICULTY AT ALL
TWISTING/PIVOTING ON INVOLVED LEG: NO DIFFICULTY AT ALL
LIGHT_TO_MODERATE_WORK: MODERATE DIFFICULTY
HOS_ADL_ITEM_SCORE_TOTAL: 65
GETTING INTO AND OUT OF AN AVERAGE CAR: NO DIFFICULTY AT ALL
GETTING_INTO_AND_OUT_OF_A_BATHTUB: NO DIFFICULTY AT ALL
WALKING_UP_STEEP_HILLS: NO DIFFICULTY AT ALL
RECREATIONAL ACTIVITIES: NO DIFFICULTY AT ALL
WALKING_DOWN_STEEP_HILLS: NO DIFFICULTY AT ALL
HEAVY_WORK: NO DIFFICULTY AT ALL

## 2023-11-07 ENCOUNTER — THERAPY VISIT (OUTPATIENT)
Dept: PHYSICAL THERAPY | Facility: CLINIC | Age: 43
End: 2023-11-07
Attending: FAMILY MEDICINE
Payer: COMMERCIAL

## 2023-11-07 DIAGNOSIS — M25.851 FEMOROACETABULAR IMPINGEMENT OF RIGHT HIP: ICD-10-CM

## 2023-11-07 DIAGNOSIS — M25.551 HIP PAIN, RIGHT: Primary | ICD-10-CM

## 2023-11-07 PROCEDURE — 97161 PT EVAL LOW COMPLEX 20 MIN: CPT | Mod: GP | Performed by: PHYSICAL THERAPIST

## 2023-11-07 PROCEDURE — 97110 THERAPEUTIC EXERCISES: CPT | Mod: GP | Performed by: PHYSICAL THERAPIST

## 2023-11-07 NOTE — PROGRESS NOTES
"PHYSICAL THERAPY EVALUATION  Type of Visit: Evaluation    See electronic medical record for Abuse and Falls Screening details.    Subjective       Presenting condition or subjective complaint: TAZ  Date of onset:      Relevant medical history:     Dates & types of surgery:      Prior diagnostic imaging/testing results: MRI; X-ray     Prior therapy history for the same diagnosis, illness or injury: No      Prior Level of Function  Transfers: Independent  Ambulation: Independent  ADL: Independent      Living Environment  Social support: With family members   Type of home: House   Stairs to enter the home: Yes 2 Is there a railing: Yes   Ramp: No   Stairs inside the home: Yes 12 Is there a railing: Yes   Help at home: None  Equipment owned:       Employment: Yes   Hobbies/Interests:      Patient goals for therapy: Not be in pain    Patient had insidious onset of right hip pain about 9 months ago, no injury.  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  Pain is right lateral buttock, lateral thigh to the knee.  She denies pain LB, below the knee or on the left side.  Pain is intermittent 0-7/10, \"achy\" and \"sharp\".  Pain is progressively increasing, worst during the night/sleeping, end of work shift.  Symptoms increase with walking (variable time), losing less than an hour of sleep but wakes frequently, lying on right side \"hurts faster\" than lying on left side, pain with hip abductor>adductor machine at [employer].   Symptoms decrease with rest.  She reports no increase pain with Stairs - doing reciprocal.  She goes to [employer] every day; cardio (stairstepper or bike or elliptical), machine weights, squats and lunges (no weight).      MRI 10-62-23 IMPRESSION:  1. No significant joint effusion in the right hip joint  2. No marrow signal abnormalities in the right hip to suggest  fracture, osteonecrosis, or marrow infiltration.  3. Fullness at the femoral head and neck junction, with spurring,  which " can be associated with femoroacetabular impingement, with  tearing of the anterior superior labrum. No full-thickness cartilage  loss.  4. 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         Objective   Alignment  Unremarkable hips/knees, decreased lumbar lordosis     Gait  Assistive device:  None  Deviations:  None     Palpation  Minimal tenderness post to greater trochanter/lateral buttock, no tenderness lateral or anterior hip     ROM   AROM Right AROM Left  PROM Right PROM Left   Hip flexion Min restricted WNL Min restricted with pain WNL   Hip extension WNL WNL WNL WNL   Hip IR 28 with ERP 36     Hip ER 30 26     Hip abduction 45 45         Strength  5/5 left hip, knee and ankle   5/5 right hip ER and extension, 4+/5 flexion and abduction, 4/5 with pain IR, knee and ankle 5/5      Repeated movement testing  Trial repeated right hip ext with pt overpressure with hand in 1/2 kneeling:  no effect pain, minimal increase IR AROM, abolished pain with IR MMT      Special Tests  ONEIDA negative  FADIR  positive      Lumbar AROM  Flexion:  WNL  Extension:  min loss  Right sideglide:  WNL  Left sideglide:  WNL    Lumbar Dermatomes  WNL/symetrical to light touch        Assessment & Plan   CLINICAL IMPRESSIONS  Medical Diagnosis: R hip TAZ    Treatment Diagnosis:     Impression/Assessment: Patient is a 43 year old female with right hip pain complaints.  The following significant findings have been identified: Decreased ROM/flexibility, Decreased strength, Impaired gait, and Decreased activity tolerance. These impairments interfere with their ability to perform work tasks, recreational activities, and community mobility as compared to previous level of function.     Clinical Decision Making (Complexity):  Clinical Presentation: Stable/Uncomplicated  Clinical Presentation Rationale: based on medical and personal factors listed  in PT evaluation  Clinical Decision Making (Complexity): Low complexity    PLAN OF CARE  Treatment Interventions:  Interventions: Neuromuscular Re-education, Therapeutic Activity, Therapeutic Exercise, Self-Care/Home Management    Long Term Goals            Frequency of Treatment: 1x/week for 4 weeks then 2x/month for 2 visits  Duration of Treatment: 8 weeks      Education Assessment:        Risks and benefits of evaluation/treatment have been explained.   Patient/Family/caregiver agrees with Plan of Care.     Evaluation Time:          Signing Clinician: Jodi Rodriguez, PT

## 2023-11-10 ENCOUNTER — OFFICE VISIT (OUTPATIENT)
Dept: ORTHOPEDICS | Facility: CLINIC | Age: 43
End: 2023-11-10
Payer: COMMERCIAL

## 2023-11-10 DIAGNOSIS — M25.851 FEMOROACETABULAR IMPINGEMENT OF RIGHT HIP: Primary | ICD-10-CM

## 2023-11-10 PROCEDURE — 20611 DRAIN/INJ JOINT/BURSA W/US: CPT | Mod: RT | Performed by: FAMILY MEDICINE

## 2023-11-10 RX ORDER — TRIAMCINOLONE ACETONIDE 40 MG/ML
40 INJECTION, SUSPENSION INTRA-ARTICULAR; INTRAMUSCULAR
Status: SHIPPED | OUTPATIENT
Start: 2023-11-10

## 2023-11-10 RX ORDER — LIDOCAINE HYDROCHLORIDE 10 MG/ML
7 INJECTION, SOLUTION EPIDURAL; INFILTRATION; INTRACAUDAL; PERINEURAL
Status: SHIPPED | OUTPATIENT
Start: 2023-11-10

## 2023-11-10 RX ADMIN — LIDOCAINE HYDROCHLORIDE 7 ML: 10 INJECTION, SOLUTION EPIDURAL; INFILTRATION; INTRACAUDAL; PERINEURAL at 11:43

## 2023-11-10 RX ADMIN — TRIAMCINOLONE ACETONIDE 40 MG: 40 INJECTION, SUSPENSION INTRA-ARTICULAR; INTRAMUSCULAR at 11:43

## 2023-11-10 NOTE — PROGRESS NOTES
Large Joint Injection: R hip joint    Date/Time: 11/10/2023 11:43 AM    Performed by: Quoc Rodas DO  Authorized by: Quoc Rodas DO    Indications:  Pain  Needle Size:  22 G  Guidance: ultrasound    Approach:  Anterior  Location:  Hip      Site:  R hip joint  Medications:  40 mg triamcinolone 40 MG/ML; 7 mL lidocaine (PF) 1 %  Outcome:  Tolerated well, no immediate complications  Procedure discussed: discussed risks, benefits, and alternatives    Consent Given by:  Patient  Timeout: timeout called immediately prior to procedure    Prep: patient was prepped and draped in usual sterile fashion       Trevon Camacho M.A., LAT, ATC  Certified Athletic Trainer

## 2023-11-10 NOTE — NURSING NOTE
[address] [address]  Dept: [phone]  ______________________________________________________________________________    Patient: Blas Perkins   : 1980   MRN: [id number]   November 10, 2023    INVASIVE PROCEDURE SAFETY CHECKLIST    Date: 11/10/23   Procedure: right hip IA USG CSI  Patient Name: Blas Perkins  MRN: [id number]  YOB: 1980    Action: Complete sections as appropriate. Any discrepancy results in a HARD COPY until resolved.     PRE PROCEDURE:  Patient ID verified with 2 identifiers (name and  or MRN): Yes  Procedure and site verified with patient/designee (when able): Yes  Accurate consent documentation in medical record: Yes  H&P (or appropriate assessment) documented in medical record: Yes  H&P must be up to 20 days prior to procedure and updates within 24 hours of procedure as applicable: NA  Relevant diagnostic and radiology test results appropriately labeled and displayed as applicable: Yes  Procedure site(s) marked with provider initials: NA    TIMEOUT:  Time-Out performed immediately prior to starting procedure, including verbal and active participation of all team members addressing the following:Yes  * Correct patient identify  * Confirmed that the correct side and site are marked  * An accurate procedure consent form  * Agreement on the procedure to be done  * Correct patient position  * Relevant images and results are properly labeled and appropriately displayed  * The need to administer antibiotics or fluids for irrigation purposes during the procedure as applicable   * Safety precautions based on patient history or medication use    DURING PROCEDURE: Verification of correct person, site, and procedures any time the responsibility for care of the patient is transferred to another member of the care team.       Prior to injection, verified patient identity using patient's name and date of  birth.  Due to injection administration, patient instructed to remain in clinic for 15 minutes  afterwards, and to report any adverse reaction to me immediately.    Joint injection was performed.      Drug Amount Wasted:  None.  Vial/Syringe: Single dose vial  Expiration Date:  7/1/25      Trevon Camacho, ATC  November 10, 2023

## 2023-11-10 NOTE — LETTER
11/10/2023      RE: Blas Perkins  [address]  [facility] [address]     Dear Colleague,    Thank you for referring your patient, Blas Perkins, to the [facility] [place]. Please see a copy of my visit note below.    PROCEDURE ENCOUNTER    [facility]  Orthopedics  Quoc Rodas DO  November 10, 2023     Name: Blas Perkins  MRN: [id number]  Age: 43 year old  : 1980    Diagnosis: Femoroacetabular impingement syndrome of right hip.    Intraarticular Hip Injection - Ultrasound Guided  The patient was informed of the risks and the benefits of the procedure and a written consent was signed.  The patient s right hip was prepped with chlorhexidine in sterile fashion.   Local anesthesia was performed using a 27-gauge 1.5-inch needle to administer 3 mL of 1% lidocaine without epinephrine.  40 mg of triamcinolone suspension was drawn up into a 5 mL syringe with 4 mL of 1% lidocaine.  Injection was performed using sterile technique.  Under ultrasound guidance a 3.5-inch 22-gauge needle was used to enter the right femoracetabular joint.  Anterior approach was used, needle placement was visualized and documented with ultrasound.  Ultrasound visualization was necessary due to decreased joint space in the setting of osteoarthritis.  Injection performed long axis to the probe.  Injection solution visualized within the joint space.  Images were permanently stored for the patient's record.  There were no complications. The patient tolerated the procedure well. There was negligible bleeding.   The patient was instructed to ice the hip upon leaving clinic and refrain from overuse over the next 3 days.   The patient was instructed to call or go to the emergency room with any unusual pain, swelling, redness, or if otherwise concerned.  Hold off on PT exercises for 48 hours.  Follow through with full course of PT.  I will see her back in 6 to 8 weeks.  Quoc Rodas DO  CAQSM  Primary Care Sports Medicine  [facility]     Large Joint Injection: R hip joint    Date/Time: 11/10/2023 11:43 AM    Performed by: Quoc Rodas DO  Authorized by: Quoc Rodas DO    Indications:  Pain  Needle Size:  22 G  Guidance: ultrasound    Approach:  Anterior  Location:  Hip      Site:  R hip joint  Medications:  40 mg triamcinolone 40 MG/ML; 7 mL lidocaine (PF) 1 %  Outcome:  Tolerated well, no immediate complications  Procedure discussed: discussed risks, benefits, and alternatives    Consent Given by:  Patient  Timeout: timeout called immediately prior to procedure    Prep: patient was prepped and draped in usual sterile fashion       Trevon Camacho M.A., LAT, ATC  Certified Athletic Trainer            Again, thank you for allowing me to participate in the care of your patient.      Sincerely,    Quoc Rodas DO

## 2023-11-10 NOTE — PROGRESS NOTES
PROCEDURE ENCOUNTER    [facility]  Orthopedics  Quoc Rodas DO  November 10, 2023     Name: Blas Perkins  MRN: [id number]  Age: 43 year old  : 1980    Diagnosis: Femoroacetabular impingement syndrome of right hip.    Intraarticular Hip Injection - Ultrasound Guided  The patient was informed of the risks and the benefits of the procedure and a written consent was signed.  The patient s right hip was prepped with chlorhexidine in sterile fashion.   Local anesthesia was performed using a 27-gauge 1.5-inch needle to administer 3 mL of 1% lidocaine without epinephrine.  40 mg of triamcinolone suspension was drawn up into a 5 mL syringe with 4 mL of 1% lidocaine.  Injection was performed using sterile technique.  Under ultrasound guidance a 3.5-inch 22-gauge needle was used to enter the right femoracetabular joint.  Anterior approach was used, needle placement was visualized and documented with ultrasound.  Ultrasound visualization was necessary due to decreased joint space in the setting of osteoarthritis.  Injection performed long axis to the probe.  Injection solution visualized within the joint space.  Images were permanently stored for the patient's record.  There were no complications. The patient tolerated the procedure well. There was negligible bleeding.   The patient was instructed to ice the hip upon leaving clinic and refrain from overuse over the next 3 days.   The patient was instructed to call or go to the emergency room with any unusual pain, swelling, redness, or if otherwise concerned.  Hold off on PT exercises for 48 hours.  Follow through with full course of PT.  I will see her back in 6 to 8 weeks.  Quoc Rodas DO CAQSM  Primary Care Sports Medicine  [facility]

## 2023-11-14 ENCOUNTER — THERAPY VISIT (OUTPATIENT)
Dept: PHYSICAL THERAPY | Facility: CLINIC | Age: 43
End: 2023-11-14
Attending: FAMILY MEDICINE
Payer: COMMERCIAL

## 2023-11-14 DIAGNOSIS — M25.551 HIP PAIN, RIGHT: Primary | ICD-10-CM

## 2023-11-14 PROCEDURE — 97530 THERAPEUTIC ACTIVITIES: CPT | Mod: GP | Performed by: PHYSICAL THERAPIST

## 2023-11-14 PROCEDURE — 97110 THERAPEUTIC EXERCISES: CPT | Mod: 59 | Performed by: PHYSICAL THERAPIST

## 2023-11-21 ENCOUNTER — THERAPY VISIT (OUTPATIENT)
Dept: PHYSICAL THERAPY | Facility: CLINIC | Age: 43
End: 2023-11-21
Attending: FAMILY MEDICINE
Payer: COMMERCIAL

## 2023-11-21 DIAGNOSIS — M25.551 HIP PAIN, RIGHT: Primary | ICD-10-CM

## 2023-11-21 PROCEDURE — 97110 THERAPEUTIC EXERCISES: CPT | Mod: GP | Performed by: PHYSICAL THERAPIST

## 2023-11-28 ENCOUNTER — THERAPY VISIT (OUTPATIENT)
Dept: PHYSICAL THERAPY | Facility: CLINIC | Age: 43
End: 2023-11-28
Payer: COMMERCIAL

## 2023-11-28 DIAGNOSIS — M25.551 HIP PAIN, RIGHT: Primary | ICD-10-CM

## 2023-11-28 PROCEDURE — 97110 THERAPEUTIC EXERCISES: CPT | Mod: GP | Performed by: PHYSICAL THERAPIST

## 2024-01-22 NOTE — PROGRESS NOTES
"    DISCHARGE  Reason for Discharge: Patient has failed to schedule further appointments.  Called/LM 1-16-24 to check status and if further visits needed.  No return call or scheduling.   11/28/23 0500   Appointment Info   Signing clinician's name / credentials Jodi Rodriguez PT   Total/Authorized Visits 6   Visits Used 4   Medical Diagnosis R hip TAZ   Other pertinent information \"ka-rin\"; steroid injection 11-10-23   Progress Note/Certification   Therapy Frequency 1x/week for 4 weeks then 2x/month for 2 visits   Predicted Duration 8 weeks   Progress Note Due Date 01/06/24   Subjective Report   Subjective Report Pain is intemittent \"ache\" 0-3/10 right lateral buttock only, not every day.  Improving - decreasing frequency and intensity of symptoms.  Has not worked but one 1/2 shift since last visit.  Doing 2x15 all strength exercises - no pain with any.  Still achy if lying on right side, no longer pain with lying on left side.   Objective Measure 1   Objective Measure AROM right hip IR 32, ER 30 degrees in sitting, no pain, 5/5 strength both without sxs.  Lumbar extension min limited, bilateral SG WNL - all without symptoms   Treatment Interventions (PT)   Interventions Therapeutic Procedure/Exercise;Therapeutic Activity   Therapeutic Procedure/Exercise   Therapeutic Procedures: strength, endurance, ROM, flexibillity minutes (83880) 35   Ther Proc 1 0/10 1)REIL with hips shifted to the left - NE, unable to get full ROM 2)DUNG review (when unable to lie down/work).  To cont with REIL and REIL with pt OP, add hips shifted to the left.  Stop if symptoms increasing   PTRx Ther Proc 1 Repeated Hip Extension in Neutral in 1/2 kneeling with hip in IR, pt OP - corrected line of force, cont 5-6 more days then DC from HEP   PTRx Ther Proc 1 - Details Hip abd SLR with sl extension review x 10 - cont HEP, add lt ankle weight when gets (ordered)   PTRx Ther Proc 2 SLGM with ankles apart review/correct x 10, trial red tband at " knees, issued to progress HEP to as able, cont every other day   PTRx Ther Proc 2 - Details Single leg bridge review x 5 left and x 5 right - cont every other day, avoid sxs with   PTRx Ther Proc 3 Cont to hold on hip abduction machine at club, went through rationale   PTRx Ther Proc 3 - Details Donkey kick review - cont every other day   Skilled Intervention Instruction in exercise to increase ROM and strength to decrease pain and increase function   Therapeutic Activity   Therapeutic Activities Ther Act 2   Ther Act 1 - Details encouraged use of roll with sitting   Skilled Intervention Education to decrease stress/strain to decrease pain and improve function   Total Session Time   Timed Code Treatment Minutes 35   Total Treatment Time (sum of timed and untimed services) 35         Equipment Issued: none    Discharge Plan: Patient to continue home program.    Referring Provider:  Quoc Rodas

## 2024-02-20 ENCOUNTER — OFFICE VISIT (OUTPATIENT)
Dept: URGENT CARE | Facility: URGENT CARE | Age: 44
End: 2024-02-20
Payer: COMMERCIAL

## 2024-02-20 VITALS
RESPIRATION RATE: 16 BRPM | TEMPERATURE: 98.5 F | OXYGEN SATURATION: 100 % | SYSTOLIC BLOOD PRESSURE: 118 MMHG | HEART RATE: 73 BPM | DIASTOLIC BLOOD PRESSURE: 72 MMHG

## 2024-02-20 DIAGNOSIS — J02.9 VIRAL PHARYNGITIS: Primary | ICD-10-CM

## 2024-02-20 DIAGNOSIS — R05.1 ACUTE COUGH: ICD-10-CM

## 2024-02-20 LAB
DEPRECATED S PYO AG THROAT QL EIA: NEGATIVE
GROUP A STREP BY PCR: NOT DETECTED

## 2024-02-20 PROCEDURE — 87651 STREP A DNA AMP PROBE: CPT | Performed by: PHYSICIAN ASSISTANT

## 2024-02-20 PROCEDURE — 99213 OFFICE O/P EST LOW 20 MIN: CPT | Performed by: PHYSICIAN ASSISTANT

## 2024-02-20 RX ORDER — BENZONATATE 100 MG/1
100 CAPSULE ORAL 3 TIMES DAILY PRN
Qty: 30 CAPSULE | Refills: 0 | Status: SHIPPED | OUTPATIENT
Start: 2024-02-20 | End: 2024-02-27

## 2024-02-20 NOTE — PROGRESS NOTES
URGENT CARE VISIT:    SUBJECTIVE:   Blas Perkins is a 43 year old female presenting with a chief complaint of sore throat for a day and cough for 3 weeks.  She denies the following symptoms: stuffy nose, shortness of breath, vomiting, and diarrhea  Course of illness is same.    Treatment measures tried include lozenges with no relief of symptoms.  Predisposing factors include none.    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 s/p cyerknife times 2 2016 for residual, follows regularly with Dr. Quintana at [facility].    Thrombocytopenia (H24) 9/30/2014    Thrombocytopenia (H24) 9/30/2014    TMJ (temporomandibular joint syndrome) 1/23/2012    Tobacco abuse      Allergies: Chantix [varenicline]   Medications:   Current Outpatient Medications   Medication Sig Dispense Refill    benzonatate (TESSALON) 100 MG capsule Take 1 capsule (100 mg) by mouth 3 times daily as needed for cough 30 capsule 0    diclofenac (VOLTAREN) 75 MG EC tablet Take 1 tablet (75 mg) by mouth 2 times daily 30 tablet 0     Social History:   Social History     Tobacco Use    Smoking status: Some Days     Packs/day: 0.25     Years: 10.00     Additional pack years: 0.00     Total pack years: 2.50     Types: Cigarettes     Start date: 12/20/1997    Smokeless tobacco: Never   Substance Use Topics    Alcohol use: No     Comment: daily x 5 years-at least a bottle of wine   11/19/13 No alcohol use       ROS:  Review of systems negative except as stated above.    OBJECTIVE:  /72   Pulse 73   Temp 98.5  F (36.9  C) (Tympanic)   Resp 16   SpO2 100%   GENERAL APPEARANCE: healthy, alert and no  distress  EYES: EOMI,  PERRL, conjunctiva clear  HENT: ear canals and TM's normal.  Nose and mouth without ulcers, erythema or lesions  NECK: supple, nontender, no lymphadenopathy  RESP: lungs clear to auscultation - no rales, rhonchi or wheezes  CV: regular rates and rhythm, normal S1 S2, no murmur noted  SKIN: no suspicious lesions or rashes    Labs:    Results for orders placed or performed in visit on 02/20/24   Streptococcus A Rapid Screen w/Reflex to PCR - Clinic Collect     Status: Normal    Specimen: Throat; Swab   Result Value Ref Range    Group A Strep antigen Negative Negative       ASSESSMENT:    ICD-10-CM    1. Viral pharyngitis  J02.9 Streptococcus A Rapid Screen w/Reflex to PCR - Clinic Collect     Group A Streptococcus PCR Throat Swab      2. Acute cough  R05.1 benzonatate (TESSALON) 100 MG capsule          PLAN:  Patient Instructions   Patient was educated on the natural course of condition. Take medications as directed. Side effects discussed. Strep PCR is pending. Conservative measures discussed including rest, increased fluids, humidifier, Cepacol lozenges, and teaspoon of honey. See your primary care provider if symptoms do not improve in 7 days. Seek emergency care if you develop shortness of breath or fever over 103.    Patient verbalized understanding and is agreeable to plan. The patient was discharged ambulatory and in stable condition.    Carolina Caal PA-C ....................  2/20/2024   12:49 PM

## 2024-02-20 NOTE — PATIENT INSTRUCTIONS
Patient was educated on the natural course of condition. Take medications as directed. Side effects discussed. Strep PCR is pending. Conservative measures discussed including rest, increased fluids, humidifier, Cepacol lozenges, and teaspoon of honey. See your primary care provider if symptoms do not improve in 7 days. Seek emergency care if you develop shortness of breath or fever over 103.

## 2024-03-04 ENCOUNTER — PATIENT OUTREACH (OUTPATIENT)
Dept: CARE COORDINATION | Facility: CLINIC | Age: 44
End: 2024-03-04
Payer: COMMERCIAL

## 2024-03-18 ENCOUNTER — PATIENT OUTREACH (OUTPATIENT)
Dept: CARE COORDINATION | Facility: CLINIC | Age: 44
End: 2024-03-18
Payer: COMMERCIAL

## 2024-05-26 ENCOUNTER — HEALTH MAINTENANCE LETTER (OUTPATIENT)
Age: 44
End: 2024-05-26

## 2024-07-24 SDOH — HEALTH STABILITY: PHYSICAL HEALTH: ON AVERAGE, HOW MANY DAYS PER WEEK DO YOU ENGAGE IN MODERATE TO STRENUOUS EXERCISE (LIKE A BRISK WALK)?: 6 DAYS

## 2024-07-24 SDOH — HEALTH STABILITY: PHYSICAL HEALTH: ON AVERAGE, HOW MANY MINUTES DO YOU ENGAGE IN EXERCISE AT THIS LEVEL?: 40 MIN

## 2024-07-24 ASSESSMENT — SOCIAL DETERMINANTS OF HEALTH (SDOH): HOW OFTEN DO YOU GET TOGETHER WITH FRIENDS OR RELATIVES?: ONCE A WEEK

## 2024-07-24 NOTE — PROGRESS NOTES
"SUBJECTIVE:  Blas Perkins is a 36 year old female with a chief complaint of sore throat and coughing  Onset of symptoms was 3 day(s) ago.    Course of illness: gradual onset.  Severity moderate  Current and Associated symptoms: nasal congestion, \"cold symptoms\" and malaise  Treatment measures tried include OTC meds.  Predisposing factors include None.  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     Thrombocytopenia (H) 9/30/2014     Thrombocytopenia (H) 9/30/2014     TMJ (temporomandibular joint syndrome) 1/23/2012     Tobacco abuse      Current Outpatient Prescriptions   Medication Sig Dispense Refill     phenytoin (DILANTIN) 100 MG ER capsule        ARTIFICIAL TEAR OP Apply to eye At Bedtime       Social History   Substance Use Topics     Smoking status: Current Some Day Smoker     Packs/day: 0.01     Types: Cigarettes     Smokeless tobacco: Never Used      Comment: 2-3 cigarettes daily     Alcohol use No      Comment: daily x 5 years-at least a bottle of wine   11/19/13 No alcohol use       ROS:  Review of systems negative except as stated above.    OBJECTIVE:   /62  Pulse 76  Temp 98.7  F (37.1  C) (Oral)  Ht 5' 7\" (1.702 m)  Wt 132 lb 11.2 oz (60.2 kg)  SpO2 98%  BMI 20.78 kg/m2  GENERAL APPEARANCE: healthy, alert and no distress  EYES: EOMI,  PERRL, conjunctiva clear  Rt eye- bulging eye ball with swelling of lids related to her meningioma , left eye WNL HENT: ear canals and TM's normal.  Nose normal.  Pharynx erythematous with no exudate noted.  NECK: supple, non-tender to palpation, no adenopathy noted  RESP: lungs clear " to auscultation - no rales, rhonchi or wheezes  CV: regular rates and rhythm, normal S1 S2, no murmur noted  ABDOMEN:  soft, nontender, no HSM or masses and bowel sounds normal  SKIN: no suspicious lesions or rashes  PSYCH: mentation appears normal      Results for orders placed or performed in visit on 02/26/17   Rapid strep screen   Result Value Ref Range    Specimen Description Throat     Rapid Strep A Screen       NEGATIVE: No Group A streptococcal antigen detected by immunoassay, await   culture report.      Micro Report Status FINAL 02/26/2017        Rapid Strep test is negative; await throat culture results.    ASSESSMENT:  Blas was seen today for pharyngitis.    Diagnoses and all orders for this visit:    Throat pain  -     Rapid strep screen  -     Beta strep group A culture    Viral URI with cough  -     guaiFENesin-codeine (ROBITUSSIN AC) 100-10 MG/5ML SOLN solution; Take 5 mLs by mouth every 4 hours as needed for cough        PLAN:   See orders in epic.   Symptomatic treat with gargles, lozenges, and OTC analgesic as needed. Follow-up with primary clinic if not improving.  Advisement given that patient will be contagious for the next 24-48 hours after antibiotics initiated  Follow up if  symptoms fail to improve or worsens   Pt understood and agreed with plan        No

## 2024-07-29 ENCOUNTER — OFFICE VISIT (OUTPATIENT)
Dept: INTERNAL MEDICINE | Facility: CLINIC | Age: 44
End: 2024-07-29
Payer: COMMERCIAL

## 2024-07-29 VITALS
SYSTOLIC BLOOD PRESSURE: 95 MMHG | OXYGEN SATURATION: 97 % | WEIGHT: 142.2 LBS | TEMPERATURE: 98.1 F | DIASTOLIC BLOOD PRESSURE: 57 MMHG | BODY MASS INDEX: 22.85 KG/M2 | HEART RATE: 66 BPM | HEIGHT: 66 IN

## 2024-07-29 DIAGNOSIS — Z12.11 SCREEN FOR COLON CANCER: ICD-10-CM

## 2024-07-29 DIAGNOSIS — M25.551 HIP PAIN, RIGHT: ICD-10-CM

## 2024-07-29 DIAGNOSIS — D32.9 MENINGIOMA OF RIGHT SPHENOID WING INVOLVING CAVERNOUS SINUS (H): ICD-10-CM

## 2024-07-29 DIAGNOSIS — Z00.00 ENCOUNTER FOR ROUTINE ADULT HEALTH EXAMINATION WITHOUT ABNORMAL FINDINGS: Primary | ICD-10-CM

## 2024-07-29 DIAGNOSIS — R76.8 POSITIVE ANA (ANTINUCLEAR ANTIBODY): ICD-10-CM

## 2024-07-29 DIAGNOSIS — Z11.59 NEED FOR HEPATITIS C SCREENING TEST: ICD-10-CM

## 2024-07-29 DIAGNOSIS — Z72.0 TOBACCO ABUSE: ICD-10-CM

## 2024-07-29 LAB
ALBUMIN SERPL BCG-MCNC: 4.5 G/DL (ref 3.5–5.2)
ALP SERPL-CCNC: 38 U/L (ref 40–150)
ALT SERPL W P-5'-P-CCNC: 16 U/L (ref 0–50)
ANION GAP SERPL CALCULATED.3IONS-SCNC: 8 MMOL/L (ref 7–15)
AST SERPL W P-5'-P-CCNC: 27 U/L (ref 0–45)
BILIRUB SERPL-MCNC: 0.3 MG/DL
BUN SERPL-MCNC: 12.5 MG/DL (ref 6–20)
CALCIUM SERPL-MCNC: 9 MG/DL (ref 8.8–10.4)
CHLORIDE SERPL-SCNC: 106 MMOL/L (ref 98–107)
CHOLEST SERPL-MCNC: 179 MG/DL
CREAT SERPL-MCNC: 0.63 MG/DL (ref 0.51–0.95)
EGFRCR SERPLBLD CKD-EPI 2021: >90 ML/MIN/1.73M2
ERYTHROCYTE [DISTWIDTH] IN BLOOD BY AUTOMATED COUNT: 12.6 % (ref 10–15)
FASTING STATUS PATIENT QL REPORTED: YES
FASTING STATUS PATIENT QL REPORTED: YES
GLUCOSE SERPL-MCNC: 92 MG/DL (ref 70–99)
HCO3 SERPL-SCNC: 27 MMOL/L (ref 22–29)
HCT VFR BLD AUTO: 39.3 % (ref 35–47)
HCV AB SERPL QL IA: NONREACTIVE
HDLC SERPL-MCNC: 86 MG/DL
HGB BLD-MCNC: 12.9 G/DL (ref 11.7–15.7)
LDLC SERPL CALC-MCNC: 85 MG/DL
MCH RBC QN AUTO: 31.2 PG (ref 26.5–33)
MCHC RBC AUTO-ENTMCNC: 32.8 G/DL (ref 31.5–36.5)
MCV RBC AUTO: 95 FL (ref 78–100)
NONHDLC SERPL-MCNC: 93 MG/DL
PLATELET # BLD AUTO: 242 10E3/UL (ref 150–450)
POTASSIUM SERPL-SCNC: 5.1 MMOL/L (ref 3.4–5.3)
PROT SERPL-MCNC: 7 G/DL (ref 6.4–8.3)
RBC # BLD AUTO: 4.13 10E6/UL (ref 3.8–5.2)
SODIUM SERPL-SCNC: 141 MMOL/L (ref 135–145)
TRIGL SERPL-MCNC: 38 MG/DL
WBC # BLD AUTO: 6.7 10E3/UL (ref 4–11)

## 2024-07-29 PROCEDURE — 36415 COLL VENOUS BLD VENIPUNCTURE: CPT | Performed by: INTERNAL MEDICINE

## 2024-07-29 PROCEDURE — 85027 COMPLETE CBC AUTOMATED: CPT | Performed by: INTERNAL MEDICINE

## 2024-07-29 PROCEDURE — 80053 COMPREHEN METABOLIC PANEL: CPT | Performed by: INTERNAL MEDICINE

## 2024-07-29 PROCEDURE — 86803 HEPATITIS C AB TEST: CPT | Performed by: INTERNAL MEDICINE

## 2024-07-29 PROCEDURE — 99396 PREV VISIT EST AGE 40-64: CPT | Performed by: INTERNAL MEDICINE

## 2024-07-29 PROCEDURE — 86038 ANTINUCLEAR ANTIBODIES: CPT | Performed by: INTERNAL MEDICINE

## 2024-07-29 PROCEDURE — 80061 LIPID PANEL: CPT | Performed by: INTERNAL MEDICINE

## 2024-07-29 PROCEDURE — 99214 OFFICE O/P EST MOD 30 MIN: CPT | Mod: 25 | Performed by: INTERNAL MEDICINE

## 2024-07-29 NOTE — PATIENT INSTRUCTIONS
Labs today as ordered  I encourage you to stop all smoking.  If  willing to quit in the future,  contact the clinic if interested in prescription therapy   or contact  [facility]  through a toll-free number (8-454-QUIT-NOW) or through the internet  (quitpartnermn.com) for possible free nicotine supplementation treatment and/or counseling  Screening colonoscopy at [facility] will call you to coordinate your procedure appointment. If you don't hear from a representative within 2 business days, please call [phone].  I would recommend an updated covid vaccination late this summer/early Fall when available and an influenza/flu vaccination in the Fall (mid/late October) 2024 at the clinic or any pharmacy  I would recommend  a  Prevnar 20 vaccine (pneumonia risk reduction). Get at a pharmacy if willing to have  Repeat MRI brain in 1 year through Neurosurgery recommendations  Healthcare  Directive discussed and given copy.   Patient to complete and return to clinic  Right hip pain per ortho management

## 2024-07-29 NOTE — PROGRESS NOTES
"Preventive Care Visit  [facility]  Dong Antonio MD, Internal Medicine  Jul 29, 2024      ASSESSMENT:   1. Encounter for routine adult health examination without abnormal findings  See plan discussion below for healthcare maintenance recommendations.  Otherwise up-to-date for age. Remains sober since 2013  - Comprehensive metabolic panel; Future  - Lipid panel reflex to direct LDL Fasting; Future  - CBC with platelets; Future  - Hepatitis C antibody; Future    2. 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  Followed by neurosurgery who recommends patient have repeat MRI 2025 (every 2 years).  Most recent MRI brain reviewed.  Denies current vision changes, balance issues, headaches or other new neurologic deficits    3. Positive OMER (antinuclear antibody)  Previous borderline OMER elevation with normal CRP and sed rate.  Asymptomatic regarding current joint issues except for hip issue as below.  Needs lab follow-up  - Anti Nuclear Arina IgG by IFA with Reflex; Future    4. Hip pain, right  Patient has known labral tear.  Was still able to do with a \"mud run\" recently for 3 miles.  Being managed by ortho    5. Tobacco abuse  Counseled regarding smoking cessation as below.  Patient not motivated to quit at this time    6. Need for hepatitis C screening test  Candidate for screening.  Low risk  - Hepatitis C antibody; Future    7. Screen for colon cancer  Candidate for screening.  Asymptomatic  - Colonoscopy Screening  Referral; Future      PLAN:   Labs today as ordered  I encourage you to stop all smoking.  If  willing to quit in the future,  contact the clinic if interested in prescription therapy   or contact  [facility]  through a toll-free number (6-989-QUIT-NOW) or through the internet  ([facility]com) for possible free nicotine supplementation treatment and/or counseling  Screening colonoscopy at [facility].  [facility] " [facility] will call you to coordinate your procedure appointment. If you don't hear from a representative within 2 business days, please call [phone].  I would recommend an updated covid vaccination late this summer/early Fall when available and an influenza/flu vaccination in the Fall (mid/late October) 2024 at the clinic or any pharmacy  I would recommend  a  Prevnar 20 vaccine (pneumonia risk reduction). Get at a pharmacy if willing to have  Repeat MRI brain in 1 year through Neurosurgery recommendations  Healthcare  Directive discussed and given copy.   Patient to complete and return to clinic  Right hip pain per ortho management  Pt declines mammogram today and wishes to do screening every other yevinita           Brian Odonnell is a 43 year old, presenting for the following:  Physical         Health Care Directive  Patient does not have a Health Care Directive or Living Will: Discussed advance care planning with patient; information given to patient to review.    HPI         7/24/2024   General Health   How would you rate your overall physical health? Good   Feel stress (tense, anxious, or unable to sleep) Not at all            7/24/2024   Nutrition   Three or more servings of calcium each day? Yes   Diet: Regular (no restrictions)   How many servings of fruit and vegetables per day? (!) 2-3   How many sweetened beverages each day? (!) 2            7/24/2024   Exercise   Days per week of moderate/strenous exercise 6 days   Average minutes spent exercising at this level 40 min            7/24/2024   Social Factors   Frequency of gathering with friends or relatives Once a week   Worry food won't last until get money to buy more No   Food not last or not have enough money for food? No   Do you have housing? (Housing is defined as stable permanent housing and does not include staying ouside in a car, in a tent, in an abandoned building, in an overnight shelter, or couch-surfing.) Yes   Are you  worried about losing your housing? No   Lack of transportation? No   Unable to get utilities (heat,electricity)? No            7/24/2024   Dental   Dentist two times every year? Yes            7/24/2024   TB Screening   Were you born outside of the [place]? No            Today's PHQ-2 Score:       7/28/2024     8:21 PM   PHQ-2 ( 1999 Pf[employer])   Q1: Little interest or pleasure in doing things 0   Q2: Feeling down, depressed or hopeless 0   PHQ-2 Score 0   Q1: Little interest or pleasure in doing things Not at all   Q2: Feeling down, depressed or hopeless Not at all   PHQ-2 Score 0           7/24/2024   Substance Use   If I could quit smoking, I would Completely agree   I want to quit somking, worry about health affects Completely agree   Willing to make a plan to quit smoking Somewhat agree   Willing to cut down before quitting Completely agree   Alcohol more than 3/day or more than 7/wk No   Do you use any other substances recreationally? No        Social History     Tobacco Use    Smoking status: Every Day     Current packs/day: 0.25     Average packs/day: 0.3 packs/day for 26.6 years (6.7 ttl pk-yrs)     Types: Cigarettes     Start date: 12/20/1997    Smokeless tobacco: Never   Vaping Use    Vaping status: Never Used   Substance Use Topics    Alcohol use: No     Comment: daily x 5 years-at least a bottle of wine   11/19/13 No alcohol use    Drug use: No            4/21/2023   LAST FHS-7 RESULTS   1st degree relative breast or ovarian cancer No   Any relative bilateral breast cancer No   Any male have breast cancer No   Any ONE woman have BOTH breast AND ovarian cancer No   Any woman with breast cancer before 50yrs No   2 or more relatives with breast AND/OR ovarian cancer No   2 or more relatives with breast AND/OR bowel cancer No           Mammogram Screening - Mammogram every 1-2 years updated in Health Maintenance based on mutual decision making  Pt was offered mammogram sdcreening today but wishes to do it every  other year        7/24/2024   STI Screening   New sexual partner(s) since last STI/HIV test? No        History of abnormal Pap smear: No - age 30- 64 PAP with HPV every 5 years recommended        Latest Ref Rng & Units 4/3/2023    10:37 AM 1/2/2018    11:20 AM 1/2/2018    11:16 AM   PAP / HPV   PAP  Negative for Intraepithelial Lesion or Malignancy (NILM)      PAP (Historical)    NIL    HPV 16 DNA Negative Negative  Negative     HPV 18 DNA Negative Negative  Negative     Other HR HPV Negative Negative  Negative       ASCVD Risk   The 10-year ASCVD risk score (Enrrique GARCIA, et al., 2019) is: 0.8%    Values used to calculate the score:      Age: 43 years      Sex: Female      Is Non- : No      Diabetic: No      Tobacco smoker: Yes      Systolic Blood Pressure: 95 mmHg      Is BP treated: No      HDL Cholesterol: 80 mg/dL      Total Cholesterol: 191 mg/dL       Reviewed and updated as needed this visit by Provider                          Review of Systems  CONSTITUTIONAL: NEGATIVE for fever, chills. Weight down 4 pounds  INTEGUMENTARY/SKIN: NEGATIVE for worrisome rashes, moles or lesions  EYES: NEGATIVE for vision changes or irritation  ENT/MOUTH: NEGATIVE for ear, mouth and throat problems  RESP: NEGATIVE for significant cough or SOB. Smoking 1/4 ppd and not motivated to quit at this time  BREAST: NEGATIVE for masses, tenderness or discharge  CV: NEGATIVE for chest pain, palpitations or peripheral edema  GI: NEGATIVE for nausea, abdominal pain, heartburn, or change in bowel habits  : NEGATIVE for frequency, dysuria, or hematuria  MUSCULOSKELETAL: NEGATIVE for significant arthralgias or myalgia except for right hip pain. Followed by ortho and has a torn labrum per pt report. Was still able to do a 3 mile mud run  recently this summer. No OTCs used  NEURO: NEGATIVE for weakness, dizziness or paresthesias. Hx meningioma resectoin. Managed by neurosurger. Last MRI brain Sept 2023  and pt  "states neurosurgery recommended doing MRIs every 2 years. No current headache or balance/coordination issues  ENDOCRINE: NEGATIVE for temperature intolerance, skin/hair changes  HEME: NEGATIVE for bleeding problems  PSYCHIATRIC: NEGATIVE for changes in mood or affect     Objective    Exam  BP 95/57   Pulse 66   Temp 98.1  F (36.7  C) (Temporal)   Ht 1.683 m (5' 6.25\")   Wt 64.5 kg (142 lb 3.2 oz)   LMP 07/08/2024 (Approximate)   SpO2 97%   BMI 22.78 kg/m     Estimated body mass index is 22.78 kg/m  as calculated from the following:    Height as of this encounter: 1.683 m (5' 6.25\").    Weight as of this encounter: 64.5 kg (142 lb 3.2 oz).    Physical Exam  General appearance - healthy, alert, no distress  Skin - No rashes or lesions.  Head - normocephalic, atraumatic  Eyes - LEANDRA, EOMI, fundi exam with nondilated pupils negative.    Ears - External ears normal. Canals clear. TM's normal.  Nose/Sinuses - Nares normal. Septum midline. Mucosa normal. No drainage or sinus tenderness.  Oropharynx - No erythema, no adenopathy, no exudates.  Neck - Supple without adenopathy or thyromegaly. No bruits.  Lungs - Clear to auscultation without wheezes/rhonchi.  Heart - Regular rate and rhythm without murmurs, clicks, or gallops.  Nodes - No supraclavicular, axillary, or inguinal adenopathy palpable.  Breasts - deferred  Abdomen - Abdomen soft, non-tender. BS normal. No masses or hepatosplenomegaly palpable. No bruits.  Extremities -No cyanosis, clubbing or edema.    Musculoskeletal - Spine ROM normal. Muscular strength intact.  Minimal tenderness to full range of motion right hip joint  Peripheral pulses - radial=4/4, femoral=4/4, posterior tibial=4/4, dorsalis pedis=4/4,  Neuro - Gait normal. Reflexes normal and symmetric. Sensation grossly WNL. Minimal ptosis  right eye from prior surgical changes  Genital/Rectal - deferred          Signed Electronically by: Dong Antonio MD     "

## 2024-07-30 LAB — ANA SER QL IF: NEGATIVE

## 2024-11-18 ENCOUNTER — OFFICE VISIT (OUTPATIENT)
Dept: INTERNAL MEDICINE | Facility: CLINIC | Age: 44
End: 2024-11-18
Payer: COMMERCIAL

## 2024-11-18 VITALS
BODY MASS INDEX: 23.42 KG/M2 | WEIGHT: 145.7 LBS | OXYGEN SATURATION: 100 % | SYSTOLIC BLOOD PRESSURE: 100 MMHG | HEIGHT: 66 IN | HEART RATE: 76 BPM | TEMPERATURE: 98.2 F | DIASTOLIC BLOOD PRESSURE: 57 MMHG

## 2024-11-18 DIAGNOSIS — Z72.0 TOBACCO ABUSE: ICD-10-CM

## 2024-11-18 DIAGNOSIS — J02.9 SORE THROAT: ICD-10-CM

## 2024-11-18 DIAGNOSIS — J06.9 UPPER RESPIRATORY TRACT INFECTION, UNSPECIFIED TYPE: ICD-10-CM

## 2024-11-18 DIAGNOSIS — R53.83 OTHER FATIGUE: ICD-10-CM

## 2024-11-18 LAB
ALBUMIN SERPL BCG-MCNC: 4.4 G/DL (ref 3.5–5.2)
ALP SERPL-CCNC: 40 U/L (ref 40–150)
ALT SERPL W P-5'-P-CCNC: 15 U/L (ref 0–50)
ANION GAP SERPL CALCULATED.3IONS-SCNC: 10 MMOL/L (ref 7–15)
AST SERPL W P-5'-P-CCNC: 27 U/L (ref 0–45)
BILIRUB SERPL-MCNC: 0.3 MG/DL
BUN SERPL-MCNC: 9.6 MG/DL (ref 6–20)
CALCIUM SERPL-MCNC: 8.9 MG/DL (ref 8.8–10.4)
CHLORIDE SERPL-SCNC: 107 MMOL/L (ref 98–107)
CORTIS SERPL-MCNC: 4.8 UG/DL
CREAT SERPL-MCNC: 0.62 MG/DL (ref 0.51–0.95)
DEPRECATED S PYO AG THROAT QL EIA: NEGATIVE
EGFRCR SERPLBLD CKD-EPI 2021: >90 ML/MIN/1.73M2
ERYTHROCYTE [DISTWIDTH] IN BLOOD BY AUTOMATED COUNT: 12.7 % (ref 10–15)
GLUCOSE SERPL-MCNC: 83 MG/DL (ref 70–99)
GROUP A STREP BY PCR: NOT DETECTED
HCO3 SERPL-SCNC: 24 MMOL/L (ref 22–29)
HCT VFR BLD AUTO: 37.2 % (ref 35–47)
HGB BLD-MCNC: 12.5 G/DL (ref 11.7–15.7)
HOLD SPECIMEN: NORMAL
MCH RBC QN AUTO: 32.1 PG (ref 26.5–33)
MCHC RBC AUTO-ENTMCNC: 33.6 G/DL (ref 31.5–36.5)
MCV RBC AUTO: 95 FL (ref 78–100)
PLATELET # BLD AUTO: 236 10E3/UL (ref 150–450)
POTASSIUM SERPL-SCNC: 4.7 MMOL/L (ref 3.4–5.3)
PROT SERPL-MCNC: 6.9 G/DL (ref 6.4–8.3)
RBC # BLD AUTO: 3.9 10E6/UL (ref 3.8–5.2)
SODIUM SERPL-SCNC: 141 MMOL/L (ref 135–145)
TSH SERPL DL<=0.005 MIU/L-ACNC: 1.11 UIU/ML (ref 0.3–4.2)
WBC # BLD AUTO: 7.8 10E3/UL (ref 4–11)

## 2024-11-18 PROCEDURE — 36415 COLL VENOUS BLD VENIPUNCTURE: CPT | Performed by: INTERNAL MEDICINE

## 2024-11-18 PROCEDURE — 80053 COMPREHEN METABOLIC PANEL: CPT | Performed by: INTERNAL MEDICINE

## 2024-11-18 PROCEDURE — 82533 TOTAL CORTISOL: CPT | Performed by: INTERNAL MEDICINE

## 2024-11-18 PROCEDURE — 85027 COMPLETE CBC AUTOMATED: CPT | Performed by: INTERNAL MEDICINE

## 2024-11-18 PROCEDURE — 87651 STREP A DNA AMP PROBE: CPT | Performed by: INTERNAL MEDICINE

## 2024-11-18 PROCEDURE — 84443 ASSAY THYROID STIM HORMONE: CPT | Performed by: INTERNAL MEDICINE

## 2024-11-18 PROCEDURE — 99214 OFFICE O/P EST MOD 30 MIN: CPT | Performed by: INTERNAL MEDICINE

## 2024-11-18 RX ORDER — AZITHROMYCIN 250 MG/1
TABLET, FILM COATED ORAL
Qty: 6 TABLET | Refills: 0 | Status: SHIPPED | OUTPATIENT
Start: 2024-11-18 | End: 2024-11-23

## 2024-11-18 NOTE — PROGRESS NOTES
ASSESSMENT:    1. Upper respiratory tract infection, unspecified type  Symptoms started as viral process likely but given persistence of symptoms now 4 weeks, will cover for possible secondary bacterial component with azithromycin.  Counseled regarding smoking cessation as below  - CBC with platelets; Future  - CBC with platelets  - azithromycin (ZITHROMAX) 250 MG tablet; Take 2 tablets (500 mg) by mouth daily for 1 day, THEN 1 tablet (250 mg) daily for 4 days.  Dispense: 6 tablet; Refill: 0    2. Sore throat  Rapid strep test negative.  Will treat symptomatically with Cepacol.  Antibiotic therapy as above  - CBC with platelets; Future  - Streptococcus A Rapid Screen w/Reflex to PCR  - CBC with platelets  - Group A Streptococcus PCR Throat Swab    3. Other fatigue  Possibly related to long COVID.  Patient did not have COVID testing however done back in September when probable COVID type URI.  Labs ordered to rule out secondary causes  - Cortisol; Future  - Comprehensive metabolic panel; Future  - CBC with platelets; Future  - TSH with free T4 reflex; Future  - Cortisol  - Comprehensive metabolic panel  - CBC with platelets  - TSH with free T4 reflex    4. Tobacco abuse  Counseled regarding smoking cessation.  Given options for counseling or nicotine supplementation as below      PLAN:  Rapid strep test negative.  Will therefore prescribe azithromycin 2 tablets today, then 1 tablet daily for 4 days for upper respiratory infection  Labs as ordered regarding fatigue  I encourage you to stop all smoking.  If  willing to quit in the future,  contact the clinic if interested in prescription therapy   or contact  [facility]  through a toll-free number (0-430-QUIT-NOW) or through the internet  ([email or web address]) for possible free nicotine supplementation treatment and/or counseling             Brian Odonnell is a 44 year old, presenting for the following health issues:  URI  Pt has a productive cough but isn't  "clearing throat. Pt has been feeling more fatigue     History of Present Illness       Reason for visit:  Cold not going away  Symptom onset:  More than a month  Symptoms include:  Cough throat oain sinus pressure fatigue  Symptom intensity:  Moderate  Symptom progression:  Staying the same  Had these symptoms before:  Yes  Has tried/received treatment for these symptoms:  No  What makes it worse:  No  What makes it better:  No   She is taking medications regularly.     Most recent lab results reviewed with pt.      Developed initial URI 9/23/2024 which she had fevers, severe achiness, cough.  Possible COVID but never did a home test or seek clinic testing.  Symptoms got better after a week or so but then a week later, developed new cough and nasal congestion along with some chest congestion.  That is improved slightly but patient continues to have clear to sometimes yellowish nasal discharge with some coughing.  Mild sore throat.  No ear pain.  Denies shortness of breath.  Minimal tenderness in sinuses.  Patient currently smoking 1/2 pack of cigarettes per day and not motivated to quit smoking at this time.  Has general fatigue.  Sleeping about 8 hours.  Denies snoring.  Does not feel refreshed in the morning when awakening. MOOD ok       Additional ROS:   Constitutional, HEENT, Cardiovascular, Pulmonary, GI and , Neuro, MSK and Psych review of systems/symptoms are otherwise negative or unchanged from previous, except as noted above.      OBJECTIVE:  /57   Pulse 76   Temp 98.2  F (36.8  C) (Temporal)   Ht 1.683 m (5' 6.25\")   Wt 66.1 kg (145 lb 11.2 oz)   SpO2 100%   BMI 23.34 kg/m     Estimated body mass index is 23.34 kg/m  as calculated from the following:    Height as of this encounter: 1.683 m (5' 6.25\").    Weight as of this encounter: 66.1 kg (145 lb 11.2 oz).     HENT: ear canals and TM's normal and nose and mouth without ulcers or lesions.  Nasal mucosa mildly edematous with slight yellowish " nasal fluid color.  Minimal tenderness to sinus palpation.  No oropharyngeal erythema or exudate seen  Neck: no adenopathy. Thyroid normal to palpation. No bruits  Pulm: Lungs clear to auscultation   CV: Regular rates and rhythm  GI: Soft, nontender, Normal active bowel sounds, No hepatosplenomegaly or masses palpable  Ext: Peripheral pulses intact. No edema.  Neuro: Alert appearing.  Moving all extremities normally      (Chart documentation was completed, in part, with [employer] voice-recognition software. Even though reviewed, some grammatical, spelling, and word errors may remain.)    Dong Antonio MD  Internal Medicine Department  [facility]

## 2024-11-28 NOTE — MR AVS SNAPSHOT
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)           Follow-ups after your visit        Your next 10 appointments already scheduled     Dec 21, 2017  2:00 PM CST   New Prenatal with RI PRENATAL NURSE   [facility]    [address]  [facility] [address]   [phone]            Dec 28, 2017  2:00 PM CST   US OB < 14 WEEKS WITH TRANSVAGINAL SINGLE with OXUS1   [facility]    [address] [address] [phone]           Please bring a list of your medicines (including vitamins, minerals and over-the-counter drugs). Also, tell your doctor about any allergies you may have. Wear comfortable clothes and leave your valuables at home.  If you re less than 20 weeks drink four 8-ounce glasses of fluid an hour before your exam. If you need to empty your bladder before your exam, try to release only a little urine. Then, drink another glass of fluid.  You may have up to two family members in the exam room. If you bring a small child, an adult must be there to care for him or her.  Please call the Imaging Department at your exam site with any questions.            Jan 02, 2018  3:00 PM CST   New Prenatal with Brody Tuttle MD   [facility]    [address] [address] [phone]              Who to contact     Please call your clinic at [phone] to:    Ask  questions about your health    Make or cancel appointments    Discuss your medicines    Learn about your test results    Speak to your doctor   If you have compliments or concerns about an experience at your clinic, or if you wish to file a complaint, please contact [facility] Patient Relations at [phone] or email us at [email or web address]         Additional Information About Your Visit        MyChart Information     Cell[employer] gives you secure access to your electronic health record. If you see a primary care provider, you can also send messages to your care team and make appointments. If you have questions, please call your primary care clinic.  If you do not have a primary care provider, please call [phone] and they will assist you.      WalkHub is an electronic gateway that provides easy, online access to your medical records. With WalkHub, you can request a clinic appointment, read your test results, renew a prescription or communicate with your care team.     To access your existing account, please contact your [facility] or call [phone] for assistance.        Care EveryWhere ID     This is your Care EveryWhere ID. This could be used by other organizations to access your [facility] medical records  [id number]        Your Vitals Were     Last Period                   10/13/2017            Blood Pressure from Last 3 Encounters:   11/30/17 108/62   07/15/17 98/60   07/07/17 101/62    Weight from Last 3 Encounters:   11/30/17 60.2 kg (132 lb 12.8 oz)   07/15/17 61.2 kg (135 lb)   07/07/17 61.7 kg (136 lb)              We Performed the Following     Glaucoma Top OU     IOP Measurement     OCT Optic Nerve RNFL Spectralis OU (both eyes)          Today's Medication Changes          These changes are accurate as of: 12/20/17  5:40 PM.  If you have any questions, ask your nurse or doctor.               Stop taking these medicines if you  haven't already. Please contact your care team if you have questions.     ARTIFICIAL TEAR OP   Stopped by:  Felix Tidwell MD           cephALEXin 500 MG capsule   Commonly known as:  KEFLEX   Stopped by:  Felix Tidwell MD           HYDROcodone-acetaminophen 5-325 MG per tablet   Commonly known as:  NORCO   Stopped by:  Felix Tidwell MD           methylPREDNISolone 4 MG tablet   Commonly known as:  MEDROL DOSEPAK   Stopped by:  Felix Tidwell MD           Multi-vitamin Tabs tablet   Stopped by:  Felix Tidwell MD           neomycin-polymyxin-dexamethasone [id number] Susp ophthalmic susp   Commonly known as:  MAXITROL   Stopped by:  Felix Tidwell MD           oxymetazoline 0.05 % spray   Commonly known as:  AFRIN NASAL SPRAY   Stopped by:  Felix Tidwell MD           sodium chloride 0.65 % nasal spray   Commonly known as:  OCEAN NASAL SPRAY   Stopped by:  Felix Tidwell MD                    Primary Care Provider Office Phone # Fax #    Dong Antonio [phone]       [address] [place] [address]        Equal Access to Services     [facility]: Hadii aad ku hadasho Soomaali, waaxda luqadaha, qaybta kaalmada adeegyada, waxay carla haydevn sanchez khsandoval labrennan . So [facility] [phone].    ATENCIÓN: Si habla español, tiene a ortiz disposición servicios gratuitos de asistencia lingüística. Llame al [phone].    We comply with applicable federal civil rights laws and [place] laws. We do not discriminate on the basis of race, color, national origin, age, disability, sex, sexual orientation, or gender identity.            Thank you!     Thank you for choosing [facility]  for your care. Our goal is always to provide you with excellent care. Hearing back from our patients is one way we can continue to improve our services. Please take a few minutes to complete the written survey that you may  receive in the mail after your visit with us. Thank you!             Your Updated Medication List - Protect others around you: Learn how to safely use, store and throw away your medicines at www.disposemymeds.org.      Notice  As of 12/20/2017  5:40 PM    You have not been prescribed any medications.       35

## 2025-03-24 ENCOUNTER — PATIENT OUTREACH (OUTPATIENT)
Dept: CARE COORDINATION | Facility: CLINIC | Age: 45
End: 2025-03-24
Payer: COMMERCIAL

## 2025-07-05 ENCOUNTER — HEALTH MAINTENANCE LETTER (OUTPATIENT)
Age: 45
End: 2025-07-05

## 2025-07-28 SDOH — HEALTH STABILITY: PHYSICAL HEALTH: ON AVERAGE, HOW MANY MINUTES DO YOU ENGAGE IN EXERCISE AT THIS LEVEL?: 40 MIN

## 2025-07-28 SDOH — HEALTH STABILITY: PHYSICAL HEALTH: ON AVERAGE, HOW MANY DAYS PER WEEK DO YOU ENGAGE IN MODERATE TO STRENUOUS EXERCISE (LIKE A BRISK WALK)?: 5 DAYS

## 2025-07-28 ASSESSMENT — SOCIAL DETERMINANTS OF HEALTH (SDOH): HOW OFTEN DO YOU GET TOGETHER WITH FRIENDS OR RELATIVES?: ONCE A WEEK

## 2025-07-31 ENCOUNTER — OFFICE VISIT (OUTPATIENT)
Dept: INTERNAL MEDICINE | Facility: CLINIC | Age: 45
End: 2025-07-31
Payer: COMMERCIAL

## 2025-07-31 VITALS
OXYGEN SATURATION: 98 % | DIASTOLIC BLOOD PRESSURE: 54 MMHG | WEIGHT: 144 LBS | HEIGHT: 67 IN | TEMPERATURE: 98.4 F | BODY MASS INDEX: 22.6 KG/M2 | SYSTOLIC BLOOD PRESSURE: 115 MMHG | RESPIRATION RATE: 19 BRPM | HEART RATE: 64 BPM

## 2025-07-31 DIAGNOSIS — Z12.11 SCREEN FOR COLON CANCER: ICD-10-CM

## 2025-07-31 DIAGNOSIS — F17.200 NICOTINE DEPENDENCE, UNCOMPLICATED, UNSPECIFIED NICOTINE PRODUCT TYPE: ICD-10-CM

## 2025-07-31 DIAGNOSIS — Z12.31 VISIT FOR SCREENING MAMMOGRAM: ICD-10-CM

## 2025-07-31 DIAGNOSIS — M54.50 CHRONIC MIDLINE LOW BACK PAIN WITHOUT SCIATICA: ICD-10-CM

## 2025-07-31 DIAGNOSIS — G89.29 CHRONIC MIDLINE LOW BACK PAIN WITHOUT SCIATICA: ICD-10-CM

## 2025-07-31 DIAGNOSIS — F41.9 ANXIETY: ICD-10-CM

## 2025-07-31 DIAGNOSIS — Z00.00 ANNUAL PHYSICAL EXAM: Primary | ICD-10-CM

## 2025-07-31 DIAGNOSIS — D32.9 MENINGIOMA OF RIGHT SPHENOID WING INVOLVING CAVERNOUS SINUS (H): ICD-10-CM

## 2025-07-31 DIAGNOSIS — Z13.220 SCREENING CHOLESTEROL LEVEL: ICD-10-CM

## 2025-07-31 PROBLEM — S73.191A LABRAL TEAR OF RIGHT HIP JOINT: Status: ACTIVE | Noted: 2023-11-07

## 2025-07-31 LAB
ALBUMIN SERPL BCG-MCNC: 4.4 G/DL (ref 3.5–5.2)
ALP SERPL-CCNC: 37 U/L (ref 40–150)
ALT SERPL W P-5'-P-CCNC: 14 U/L (ref 0–50)
ANION GAP SERPL CALCULATED.3IONS-SCNC: 7 MMOL/L (ref 7–15)
AST SERPL W P-5'-P-CCNC: 25 U/L (ref 0–45)
BILIRUB SERPL-MCNC: 0.4 MG/DL
BUN SERPL-MCNC: 7.9 MG/DL (ref 6–20)
CALCIUM SERPL-MCNC: 9.3 MG/DL (ref 8.8–10.4)
CHLORIDE SERPL-SCNC: 103 MMOL/L (ref 98–107)
CHOLEST SERPL-MCNC: 169 MG/DL
CREAT SERPL-MCNC: 0.64 MG/DL (ref 0.51–0.95)
EGFRCR SERPLBLD CKD-EPI 2021: >90 ML/MIN/1.73M2
FASTING STATUS PATIENT QL REPORTED: YES
FASTING STATUS PATIENT QL REPORTED: YES
GLUCOSE SERPL-MCNC: 82 MG/DL (ref 70–99)
HCO3 SERPL-SCNC: 28 MMOL/L (ref 22–29)
HDLC SERPL-MCNC: 82 MG/DL
LDLC SERPL CALC-MCNC: 78 MG/DL
NONHDLC SERPL-MCNC: 87 MG/DL
POTASSIUM SERPL-SCNC: 3.7 MMOL/L (ref 3.4–5.3)
PROT SERPL-MCNC: 6.6 G/DL (ref 6.4–8.3)
SODIUM SERPL-SCNC: 138 MMOL/L (ref 135–145)
TRIGL SERPL-MCNC: 45 MG/DL

## 2025-07-31 RX ORDER — BUPROPION HYDROCHLORIDE 150 MG/1
150 TABLET, FILM COATED, EXTENDED RELEASE ORAL 2 TIMES DAILY
Qty: 60 TABLET | Refills: 2 | Status: SHIPPED | OUTPATIENT
Start: 2025-08-30

## 2025-07-31 RX ORDER — BUPROPION HYDROCHLORIDE 150 MG/1
TABLET, FILM COATED, EXTENDED RELEASE ORAL
Qty: 57 TABLET | Refills: 0 | Status: SHIPPED | OUTPATIENT
Start: 2025-07-31 | End: 2025-08-30

## 2025-07-31 ASSESSMENT — ANXIETY QUESTIONNAIRES
GAD7 TOTAL SCORE: 0
1. FEELING NERVOUS, ANXIOUS, OR ON EDGE: NOT AT ALL
6. BECOMING EASILY ANNOYED OR IRRITABLE: NOT AT ALL
GAD7 TOTAL SCORE: 0
5. BEING SO RESTLESS THAT IT IS HARD TO SIT STILL: NOT AT ALL
7. FEELING AFRAID AS IF SOMETHING AWFUL MIGHT HAPPEN: NOT AT ALL
IF YOU CHECKED OFF ANY PROBLEMS ON THIS QUESTIONNAIRE, HOW DIFFICULT HAVE THESE PROBLEMS MADE IT FOR YOU TO DO YOUR WORK, TAKE CARE OF THINGS AT HOME, OR GET ALONG WITH OTHER PEOPLE: NOT DIFFICULT AT ALL
3. WORRYING TOO MUCH ABOUT DIFFERENT THINGS: NOT AT ALL
2. NOT BEING ABLE TO STOP OR CONTROL WORRYING: NOT AT ALL

## 2025-07-31 ASSESSMENT — PATIENT HEALTH QUESTIONNAIRE - PHQ9: 5. POOR APPETITE OR OVEREATING: NOT AT ALL

## 2025-07-31 NOTE — PATIENT INSTRUCTIONS
Colon cancer screening:  Colon cancer can be treated effectively in most cases, as long as we catch it early enough, because it is so slow-growing.   Colon cancer tends to not have symptoms until it is advanced.    There are 2 types of colon cancer screening tests: colonoscopy or a stool-based test.    The benefits of colonoscopy include early identification of pre-cancerous or cancerous growths, if present, and the ability to remove them during the colonoscopy procedure. Additionally, if your colonoscopy is normal, you would not have to be rescreened for another 10 years.    The main benefit of stool-based testing (an alternative to colonoscopy) is that it is less invasive. You collect a stool sample at home and send it back through the mail. If negative, you repeat testing annually (for FIT test) or every 3 years (for Cologuard test). If positive, I will refer you to gastroenterology for further testing.    Comparison of colon cancer screening methods:  FIT: Accuracy detecting colon cancer 74%. False positive rate 5%.   Cologuard: Accuracy detecting colon cancer 92%. False positive rate 13%. (In people age 45-59, 100% sensitivity (i.e. rate of true positives) and 92% specificity (rate of true negatives).)  --Cologuard Plus kit (Medicare) is 95% sensitivity (i.e. rate of true positives) and 94% specificity (rate of true negatives) for people age 45-84. (In people age 45-59, 100% sensitivity and 94% specificity.)  Colonoscopy: Accuracy rate up to 99% (gold standard for colon cancer screening)    Ultimately, the best screening method for you is the one that you do!        Patient Education   Preventive Care Advice   This is general advice we often give to help people stay healthy. Your care team may have specific advice just for you. Please talk to your care team about your own preventive care needs.  Lifestyle  Exercise at least 150 minutes each week (30 minutes a day, 5 days a week).  Do muscle strengthening  "activities 2 days a week. These help control your weight and prevent disease.  No smoking.  Wear sunscreen to prevent skin cancer.  Take time with family and friends.  Have your home tested for radon every 2 to 5 years. Radon is a colorless, odorless gas that can harm your lungs. To learn more, go to www.health.[facility].mn. and search for \"Radon in Homes.\"  Keep guns unloaded and locked up in a safe place like a safe or gun vault, or, use a gun lock and hide the keys. Always lock away bullets separately. To learn more, visit iJoule.mn.gov and search for \"safe gun storage.\"  Nutrition  Eat 5 or more servings of fruits and vegetables each day.  Try wheat bread, brown rice and whole grain pasta (instead of white bread, rice, and pasta).  Get enough calcium and vitamin D. Check the label on foods and aim for 100% of the RDA (recommended daily allowance).  Regular exams  Have a dental exam and cleaning every 6 months.  Older adults: Ask your care team how often to have memory testing.  See your health care team every year to talk about:  Any changes in your health.  Any medicines your care team has prescribed.  Preventive care, family planning, and ways to prevent chronic diseases.  Shots (vaccines)   HPV shots (up to age 26), if you've never had them before.  Hepatitis B shots (up to age 59), if you've never had them before.  COVID-19 shot: Get this shot when it's due.  Flu shot: Get a flu shot every year.  Tetanus shot: Get a tetanus shot every 10 years.  Pneumococcal, hepatitis A, and RSV shots: Ask your care team if you need these based on your risk.  Shingles shot (for age 50 and up).  General health tests  Diabetes screening:  Starting at age 35, Get screened for diabetes at least every 3 years.  If you are younger than age 35, ask your care team if you should be screened for diabetes.  Cholesterol test: At age 39, start having a cholesterol test every 5 years, or more often if advised.  Bone density scan (DEXA): At " age 50, ask your care team if you should have this scan for osteoporosis (brittle bones).  Hepatitis C: Get tested at least once in your life.  Abdominal aortic aneurysm screening: Talk to your doctor about having this screening if you:  Have ever smoked; and  Are biologically male; and  Are between the ages of 65 and 75.  STIs (sexually transmitted infections)  Before age 24: Ask your care team if you should be screened for STIs.  After age 24: Get screened for STIs if you're at risk. You are at risk for STIs (including HIV) if:  You are sexually active with more than one person.  You don't use condoms every time.  You or a partner was diagnosed with a sexually transmitted infection.  If you are at risk for HIV, ask about PrEP medicine to prevent HIV.  Get tested for HIV at least once in your life, whether you are at risk for HIV or not.  Cancer screening tests  Cervical cancer screening: If you have a cervix, begin getting regular cervical cancer screening tests at age 21. Most people who have regular screenings with normal results can stop after age 65. Talk about this with your provider.  Breast cancer scan (mammogram): If you've ever had breasts, begin having regular mammograms starting at age 40. This is a scan to check for breast cancer.  Colon cancer screening: It is important to start screening for colon cancer at age 45.  Have a colonoscopy test every 10 years (or more often if you're at risk) Or, ask your provider about stool tests like a FIT test every year or Cologuard test every 3 years.  To learn more about your testing options, visit: www.Genmab/349656.pdf.  For help making a decision, visit: blaze/vu67065.  Prostate cancer screening test: If you have a prostate and are age 55 to 69, ask your provider if you would benefit from a yearly prostate cancer screening test.  Lung cancer screening: If you are a current or former smoker age 50 to 80, ask your care team if ongoing lung cancer screenings  are right for you.    For informational purposes only. Not to replace the advice of your health care provider. Copyright   2023 [facility]. All rights reserved. Clinically reviewed by the [facility] Transitions Program. [facility] 692312 - REV 6/25.  Substance Use Disorder: Care Instructions  Overview     You can improve your life and health by stopping your use of alcohol or drugs. When you don't drink or use drugs, you may feel and sleep better. You may get along better with your family, friends, and coworkers. There are medicines and programs that can help with substance use disorder.  How can you care for yourself at home?  Here are some ways to help you stay sober and prevent relapse.  If you have been given medicine to help keep you sober or reduce your cravings, be sure to take it exactly as prescribed.  Talk to your doctor about programs that can help you stop using drugs or drinking alcohol.  Do not keep alcohol or drugs in your home.  Plan ahead. Think about what you'll say if other people ask you to drink or use drugs. Try not to spend time with people who drink or use drugs.  Use the time and money spent on drinking or drugs to do something that's important to you.  Preventing a relapse  Have a plan to deal with relapse. Learn to recognize changes in your thinking that lead you to drink or use drugs. Get help before you start to drink or use drugs again.  Try to stay away from situations, friends, or places that may lead you to drink or use drugs.  If you feel the need to drink alcohol or use drugs again, seek help right away. Call a trusted friend or family member. Some people get support from organizations such as Narcotics Anonymous or [employer] or from treatment facilities.  If you relapse, get help as soon as you can. Some people make a plan with another person that outlines what they want that person to do for them if they relapse. The plan usually includes how to handle  the relapse and who to notify in case of relapse.  Don't give up. Remember that a relapse doesn't mean that you have failed. Use the experience to learn the triggers that lead you to drink or use drugs. Then quit again. Recovery is a lifelong process. Many people have several relapses before they are able to quit for good.  Follow-up care is a key part of your treatment and safety. Be sure to make and go to all appointments, and call your doctor if you are having problems. It's also a good idea to know your test results and keep a list of the medicines you take.  When should you call for help?   Call 911  anytime you think you may need emergency care. For example, call if you or someone else:    Has overdosed or has withdrawal signs. Be sure to tell the emergency workers that you are or someone else is using or trying to quit using drugs. Overdose or withdrawal signs may include:  Losing consciousness.  Seizure.  Seeing or hearing things that aren't there (hallucinations).     Is thinking or talking about suicide or harming others.   Where to get help 24 hours a day, 7 days a week   If you or someone you know talks about suicide, self-harm, a mental health crisis, a substance use crisis, or any other kind of emotional distress, get help right away. You can:    Call the Suicide and Crisis Lifeline at 981.     Call 2-271-045-NMMB (1-151.265.9035).     Text HOME to 124783 to access the Crisis Text Line.   Consider saving these numbers in your phone.  Go to MyLifePlace.org for more information or to chat online.  Call your doctor now or seek immediate medical care if:    You are having withdrawal symptoms. These may include nausea or vomiting, sweating, shakiness, and anxiety.   Watch closely for changes in your health, and be sure to contact your doctor if:    You have a relapse.     You need more help or support to stop.   Where can you learn more?  Go to https://www.healthwise.net/patiented  Enter H573 in the search  "box to learn more about \"Substance Use Disorder: Care Instructions.\"  Current as of: August 20, 2024  Content Version: 14.5 2024-2025 [employer].   Care instructions adapted under license by your healthcare professional. If you have questions about a medical condition or this instruction, always ask your healthcare professional. [employer] disclaims any warranty or liability for your use of this information.       "

## 2025-07-31 NOTE — PROGRESS NOTES
Preventive Care Visit  [facility]  LUIS Samuel CNP, Internal Medicine  Jul 31, 2025    Assessment & Plan     (Z00.00) Annual physical exam  (primary encounter diagnosis)  Comment: Past medical history, surgical history, family history, social history, sexual history, medications, allergies, and immunizations reviewed and updated as appropriate.   Care gaps addressed, including routine screenings.  Annual lab work ordered.  Physical exam unremarkable, except as noted.  Diet/exercise recommendations discussed.  Plan: Comprehensive metabolic panel    (M54.50,  G89.29) Chronic midline low back pain without sciatica  Comment: Pain and stiffness in the morning only. Exam unremarkable. No red flag symptoms.  PT was previously not helpful.  Has not had imaging; deferred today.  She has an orthopedist for her hip and will discuss with that provider.    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 Followed by Dr. Laura Quintana at [facility] every 3 years.    (F41.9) Anxiety  Comment: Resolved at present. GAD7 score 0.    (F17.200) Nicotine dependence, uncomplicated, unspecified nicotine product type  Comment: Smoking 1/4 ppd; interested in quitting.  Nicotine replacement products were not helpful and Chantix caused insomnia and nightmares.  Interested in trying bupropion. Denies seizure history.  Plan: [place] Quit Partner Referral, buPROPion (ZYBAN) 150        MG 12 hr tablet, buPROPion (ZYBAN) 150 MG 12 hr        tablet, SMOKING CESSATION COUNSELING 3-10 MIN    (Z13.220) Screening cholesterol level  Comment: Labs ordered for screening.  Plan: Lipid panel reflex to direct LDL Fasting    (Z12.31) Visit for screening mammogram  Comment: Mammogram ordered.  Plan: MA Screening Bilateral w/ Davonte    (Z12.11) Screen for colon cancer  Comment: Informed consent completed. She would like to proceed with colonoscopy.  Plan: Colonoscopy Screening  " Referral     Patient has been advised of split billing requirements and indicates understanding: Yes    Nicotine/Tobacco Cessation  She reports that she has been smoking cigarettes. She started smoking about 27 years ago. She has a 6.9 pack-year smoking history. She has never used smokeless tobacco.  Nicotine/Tobacco Cessation Plan  Phone counseling: Place order for Quit Partner Referral  Pharmacotherapies : bupropion (Zyban)    Counseling  Appropriate preventive services were addressed with this patient via screening, questionnaire, or discussion as appropriate for fall prevention, nutrition, physical activity, Tobacco-use cessation, social engagement, weight loss and cognition.  Checklist reviewing preventive services available has been given to the patient.  Reviewed patient's diet, addressing concerns and/or questions.   Reviewed preventive health counseling, as reflected in patient instructions       Colorectal Cancer Screening    Follow-up    Follow-up Visit   Expected date:  Jul 31, 2026 (Approximate)      Follow Up Appointment Details:     Follow-up with whom?: PCP    Follow-Up for what?: Adult Preventive    How?: In Person                     Brian Odonnell is a 44 year old, presenting for the following:  Physical    HPI  Here for annual exam.  PCP is Dr. Antonio    The following concerns were also addressed today:  Low back pain: On-going for a couple years; progressive. Symptoms don't bother her during the day. When she awakens in the morning, pain level is 4-5/10. Also has stiffness in the morning. \"It's uncomfortable enough to wake me up.\" Pain and stiffness lasts for \"a few hours.\" Has right hip pain secondary to a torn labrum, but no lower extremity pain, numbness/tingling, or weakness related to back pain. She has had PT for this already and did not find it helpful. Wondering what specialist would be helpful; does not need a referral.  Smoking cessation: Interested in quitting. Has " smoked 1/4 ppd since 1997. Nicotine replacement products (patch, gum, lozenge) have not been helpful. Chantix caused vivid dreams and insomnia. Has not tried Wellbutrin or nortriptyline.      Health Maintenance:  Vaccines due: COVID-19, pneumococcal, and Hepatitis B  Mammogram: Mammogram Screening: Recommended annual mammography. Prefers every other year screening. Family history of breast cancer?: Yes  Pap:   11/17/05 [facility] Pap   1/5/06 COLP- [facility] 1   2/16/06 COLP- Negative. NIL pap   8/31/06 COLP- [facility] 1. NIL pap   2011 NIL pap   2013 NIL   Pap 2018 NIL Pap, Neg HPV   4/3/23 NIL pap, Neg HPV.   Due 4/2028.  Family history of uterine cancer: Yes; ovarian cancer: Yes.  Colon cancer screening: Has never had colon cancer screening. Informed consent completed; would like to proceed with colonoscopy. Family history of colon cancer?: no  DEXA: N/A.  Screening chest CT: N/A. Family history of lung cancer?: no  AAA screen: N/A  Vision: No concerns.   Dental: No concerns.   Hearing: No concerns.     Sexual Health History:  Partners:Male  Contraception: none  Menstrual history: regular and LMP: 6/27/2025  Perimenopausal symptoms?: no perimenopausal symptoms     Advance Care Planning  Discussed advance care planning with patient; however, patient declined at this time.        7/28/2025   General Health   How would you rate your overall physical health? Good   Feel stress (tense, anxious, or unable to sleep) Not at all         7/28/2025   Nutrition   Three or more servings of calcium each day? Yes   Diet: Regular (no restrictions)   How many servings of fruit and vegetables per day? (!) 2-3   How many sweetened beverages each day? (!) 2         7/28/2025   Exercise   Days per week of moderate/strenous exercise 5 days   Average minutes spent exercising at this level 40 min         7/28/2025   Social Factors   Frequency of gathering with friends or relatives Once a week   Worry food won't last until get money to buy more No   Food  not last or not have enough money for food? No   Do you have housing? (Housing is defined as stable permanent housing and does not include staying outside in a car, in a tent, in an abandoned building, in an overnight shelter, or couch-surfing.) Yes   Are you worried about losing your housing? No   Lack of transportation? No   Unable to get utilities (heat,electricity)? No         7/28/2025   Dental   Dentist two times every year? Yes     Today's PHQ-2 Score:       7/31/2025    12:40 PM   PHQ-2 ( 1999 [employer])   Q1: Little interest or pleasure in doing things 0   Q2: Feeling down, depressed or hopeless 0   PHQ-2 Score 0    Q1: Little interest or pleasure in doing things Not at all   Q2: Feeling down, depressed or hopeless Not at all   PHQ-2 Score 0       Patient-reported         10/17/2013     4:14 PM 7/31/2025     1:33 PM   ANNA-7 SCORE   Total Score 1    Total Score  0         7/28/2025   Substance Use   Alcohol more than 3/day or more than 7/wk Not Applicable   Do you use any other substances recreationally? (!) CANNABIS PRODUCTS     Social History     Tobacco Use    Smoking status: Some Days     Current packs/day: 0.25     Average packs/day: 0.3 packs/day for 27.6 years (6.9 ttl pk-yrs)     Types: Cigarettes     Start date: 12/20/1997    Smokeless tobacco: Never   Vaping Use    Vaping status: Never Used   Substance Use Topics    Alcohol use: No     Comment: daily x 5 years-at least a bottle of wine   11/19/13 No alcohol use    Drug use: No         4/21/2023   LAST FHS-7 RESULTS   1st degree relative breast or ovarian cancer No   Any relative bilateral breast cancer No   Any male have breast cancer No   Any ONE woman have BOTH breast AND ovarian cancer No   Any woman with breast cancer before 50yrs No   2 or more relatives with breast AND/OR ovarian cancer No   2 or more relatives with breast AND/OR bowel cancer No         7/28/2025   STI Screening   New sexual partner(s) since last STI/HIV test? No         Latest  "Ref Rng & Units 4/3/2023    10:37 AM 1/2/2018    11:20 AM 1/2/2018    11:16 AM   PAP / HPV   PAP  Negative for Intraepithelial Lesion or Malignancy (NILM)      PAP (Historical)    NIL    HPV 16 DNA Negative Negative  Negative     HPV 18 DNA Negative Negative  Negative     Other HR HPV Negative Negative  Negative       ASCVD Risk   The 10-year ASCVD risk score (Enrrique GARCIA, et al., 2019) is: 1%    Values used to calculate the score:      Age: 44 years      Sex: Female      Is Non- : No      Diabetic: No      Tobacco smoker: Yes      Systolic Blood Pressure: 115 mmHg      Is BP treated: No      HDL Cholesterol: 86 mg/dL      Total Cholesterol: 179 mg/dL    Reviewed and updated as needed this visit by Provider   Tobacco   Meds  Problems  Med Hx  Surg Hx  Fam Hx  Soc Hx Sexual   Activity              Review of Systems  Constitutional, HEENT, cardiovascular, pulmonary, GI, , musculoskeletal, neuro, skin, endocrine and psych systems are negative, except as otherwise noted.     Objective    Exam  /54 (BP Location: Left arm, Patient Position: Sitting, Cuff Size: Adult Regular)   Pulse 64   Temp 98.4  F (36.9  C) (Temporal)   Resp 19   Ht 1.689 m (5' 6.5\")   Wt 65.3 kg (144 lb)   LMP 07/17/2025 (Approximate)   SpO2 98%   BMI 22.89 kg/m     Estimated body mass index is 22.89 kg/m  as calculated from the following:    Height as of this encounter: 1.689 m (5' 6.5\").    Weight as of this encounter: 65.3 kg (144 lb).    Physical Exam  Vitals and nursing note reviewed.   Constitutional:       Appearance: Normal appearance.   HENT:      Head: Normocephalic and atraumatic.      Right Ear: Tympanic membrane, ear canal and external ear normal.      Left Ear: Tympanic membrane, ear canal and external ear normal.      Nose: Nose normal.      Mouth/Throat:      Pharynx: Oropharynx is clear.   Eyes:      Extraocular Movements: Extraocular movements intact.      Conjunctiva/sclera: " Conjunctivae normal.      Pupils: Pupils are equal, round, and reactive to light.   Cardiovascular:      Rate and Rhythm: Normal rate and regular rhythm.      Pulses: Normal pulses.      Heart sounds: Normal heart sounds. No murmur heard.     No friction rub. No gallop.      Comments: Varicose veins bilateral lower extremities L>R  Pulmonary:      Effort: Pulmonary effort is normal. No respiratory distress.      Breath sounds: Normal breath sounds. No wheezing, rhonchi or rales.   Abdominal:      General: Bowel sounds are normal. There is no distension.      Palpations: Abdomen is soft. There is no mass.      Tenderness: There is no abdominal tenderness. There is no guarding.      Hernia: No hernia is present.   Musculoskeletal:         General: No swelling. Normal range of motion.      Cervical back: Normal range of motion and neck supple.      Lumbar back: Normal. Normal range of motion.   Skin:     General: Skin is warm and dry.   Neurological:      General: No focal deficit present.      Mental Status: She is alert and oriented to person, place, and time.      Sensory: Sensation is intact.      Motor: Motor function is intact.      Gait: Gait is intact.   Psychiatric:         Mood and Affect: Mood normal.         Behavior: Behavior normal.         Thought Content: Thought content normal.         Judgment: Judgment normal.       Signed Electronically by: LUIS Samuel CNP

## 2025-08-01 ENCOUNTER — ANCILLARY PROCEDURE (OUTPATIENT)
Dept: MAMMOGRAPHY | Facility: CLINIC | Age: 45
End: 2025-08-01
Attending: NURSE PRACTITIONER
Payer: COMMERCIAL

## 2025-08-01 DIAGNOSIS — Z12.31 VISIT FOR SCREENING MAMMOGRAM: ICD-10-CM

## 2025-08-01 PROCEDURE — 77063 BREAST TOMOSYNTHESIS BI: CPT | Mod: TC | Performed by: RADIOLOGY

## 2025-08-01 PROCEDURE — 77067 SCR MAMMO BI INCL CAD: CPT | Mod: TC | Performed by: RADIOLOGY

## 2025-08-07 ENCOUNTER — TELEPHONE (OUTPATIENT)
Dept: GASTROENTEROLOGY | Facility: CLINIC | Age: 45
End: 2025-08-07
Payer: COMMERCIAL

## 2025-08-14 ENCOUNTER — HOSPITAL ENCOUNTER (OUTPATIENT)
Facility: CLINIC | Age: 45
Discharge: HOME | End: 2025-08-14
Attending: INTERNAL MEDICINE | Admitting: INTERNAL MEDICINE
Payer: COMMERCIAL

## 2025-08-14 ENCOUNTER — RESULTS FOLLOW-UP (OUTPATIENT)
Dept: GASTROENTEROLOGY | Facility: CLINIC | Age: 45
End: 2025-08-14
Payer: COMMERCIAL

## 2025-08-14 VITALS
SYSTOLIC BLOOD PRESSURE: 99 MMHG | HEIGHT: 67 IN | RESPIRATION RATE: 14 BRPM | OXYGEN SATURATION: 96 % | WEIGHT: 140 LBS | DIASTOLIC BLOOD PRESSURE: 57 MMHG | TEMPERATURE: 97.2 F | BODY MASS INDEX: 21.97 KG/M2 | HEART RATE: 65 BPM

## 2025-08-14 LAB — COLONOSCOPY: NORMAL

## 2025-08-14 PROCEDURE — G0121 COLON CA SCRN NOT HI RSK IND: HCPCS | Performed by: INTERNAL MEDICINE

## 2025-08-14 PROCEDURE — G0500 MOD SEDAT ENDO SERVICE >5YRS: HCPCS | Performed by: INTERNAL MEDICINE

## 2025-08-14 PROCEDURE — 250N000011 HC RX IP 250 OP 636: Performed by: INTERNAL MEDICINE

## 2025-08-14 RX ORDER — FENTANYL CITRATE 50 UG/ML
25-100 INJECTION, SOLUTION INTRAMUSCULAR; INTRAVENOUS EVERY 5 MIN PRN
Refills: 0 | Status: DISCONTINUED | OUTPATIENT
Start: 2025-08-14 | End: 2025-08-14 | Stop reason: HOSPADM

## 2025-08-14 RX ORDER — ONDANSETRON 2 MG/ML
4 INJECTION INTRAMUSCULAR; INTRAVENOUS
Status: DISCONTINUED | OUTPATIENT
Start: 2025-08-14 | End: 2025-08-14 | Stop reason: HOSPADM

## 2025-08-14 RX ORDER — NALOXONE HYDROCHLORIDE 0.4 MG/ML
0.4 INJECTION, SOLUTION INTRAMUSCULAR; INTRAVENOUS; SUBCUTANEOUS
Status: DISCONTINUED | OUTPATIENT
Start: 2025-08-14 | End: 2025-08-14 | Stop reason: HOSPADM

## 2025-08-14 RX ORDER — FLUMAZENIL 0.1 MG/ML
0.2 INJECTION, SOLUTION INTRAVENOUS
Status: CANCELLED | OUTPATIENT
Start: 2025-08-14 | End: 2025-08-15

## 2025-08-14 RX ORDER — ONDANSETRON 4 MG/1
4 TABLET, ORALLY DISINTEGRATING ORAL EVERY 6 HOURS PRN
Status: CANCELLED | OUTPATIENT
Start: 2025-08-14

## 2025-08-14 RX ORDER — FLUMAZENIL 0.1 MG/ML
0.2 INJECTION, SOLUTION INTRAVENOUS
Status: DISCONTINUED | OUTPATIENT
Start: 2025-08-14 | End: 2025-08-14 | Stop reason: HOSPADM

## 2025-08-14 RX ORDER — PROCHLORPERAZINE MALEATE 10 MG
10 TABLET ORAL EVERY 6 HOURS PRN
Status: CANCELLED | OUTPATIENT
Start: 2025-08-14

## 2025-08-14 RX ORDER — NALOXONE HYDROCHLORIDE 0.4 MG/ML
0.2 INJECTION, SOLUTION INTRAMUSCULAR; INTRAVENOUS; SUBCUTANEOUS
Status: DISCONTINUED | OUTPATIENT
Start: 2025-08-14 | End: 2025-08-14 | Stop reason: HOSPADM

## 2025-08-14 RX ORDER — EPINEPHRINE 1 MG/ML
0.1 INJECTION, SOLUTION, CONCENTRATE INTRAVENOUS
Status: DISCONTINUED | OUTPATIENT
Start: 2025-08-14 | End: 2025-08-14 | Stop reason: HOSPADM

## 2025-08-14 RX ORDER — ATROPINE SULFATE 0.1 MG/ML
1 INJECTION INTRAVENOUS
Status: DISCONTINUED | OUTPATIENT
Start: 2025-08-14 | End: 2025-08-14 | Stop reason: HOSPADM

## 2025-08-14 RX ORDER — SIMETHICONE 40MG/0.6ML
133 SUSPENSION, DROPS(FINAL DOSAGE FORM)(ML) ORAL
Status: DISCONTINUED | OUTPATIENT
Start: 2025-08-14 | End: 2025-08-14 | Stop reason: HOSPADM

## 2025-08-14 RX ORDER — ONDANSETRON 2 MG/ML
4 INJECTION INTRAMUSCULAR; INTRAVENOUS EVERY 6 HOURS PRN
Status: CANCELLED | OUTPATIENT
Start: 2025-08-14

## 2025-08-14 RX ORDER — LIDOCAINE 40 MG/G
CREAM TOPICAL
Status: DISCONTINUED | OUTPATIENT
Start: 2025-08-14 | End: 2025-08-14 | Stop reason: HOSPADM

## 2025-08-14 RX ADMIN — MIDAZOLAM 1 MG: 1 INJECTION INTRAMUSCULAR; INTRAVENOUS at 11:53

## 2025-08-14 RX ADMIN — FENTANYL CITRATE 100 MCG: 50 INJECTION, SOLUTION INTRAMUSCULAR; INTRAVENOUS at 11:47

## 2025-08-14 RX ADMIN — FENTANYL CITRATE 50 MCG: 50 INJECTION, SOLUTION INTRAMUSCULAR; INTRAVENOUS at 12:00

## 2025-08-14 RX ADMIN — FENTANYL CITRATE 50 MCG: 50 INJECTION, SOLUTION INTRAMUSCULAR; INTRAVENOUS at 12:03

## 2025-08-14 RX ADMIN — FENTANYL CITRATE 50 MCG: 50 INJECTION, SOLUTION INTRAMUSCULAR; INTRAVENOUS at 11:52

## 2025-08-14 RX ADMIN — MIDAZOLAM 2 MG: 1 INJECTION INTRAMUSCULAR; INTRAVENOUS at 11:47

## 2025-08-14 RX ADMIN — FENTANYL CITRATE 50 MCG: 50 INJECTION, SOLUTION INTRAMUSCULAR; INTRAVENOUS at 11:53

## 2025-08-14 RX ADMIN — MIDAZOLAM 1 MG: 1 INJECTION INTRAMUSCULAR; INTRAVENOUS at 11:52

## 2025-08-14 RX ADMIN — MIDAZOLAM 1 MG: 1 INJECTION INTRAMUSCULAR; INTRAVENOUS at 12:03

## 2025-08-14 RX ADMIN — MIDAZOLAM 1 MG: 1 INJECTION INTRAMUSCULAR; INTRAVENOUS at 12:00

## 2025-08-14 ASSESSMENT — ACTIVITIES OF DAILY LIVING (ADL)
ADLS_ACUITY_SCORE: 41

## 2025-08-15 ENCOUNTER — HOSPITAL ENCOUNTER (OUTPATIENT)
Dept: CT IMAGING | Facility: CLINIC | Age: 45
Discharge: HOME | End: 2025-08-15
Attending: INTERNAL MEDICINE | Admitting: INTERNAL MEDICINE
Payer: COMMERCIAL

## 2025-08-15 DIAGNOSIS — Z12.11 COLON CANCER SCREENING: ICD-10-CM

## 2025-08-15 PROCEDURE — 74261 CT COLONOGRAPHY DX: CPT

## (undated) DEVICE — LINER SUCTION US ASPIRATOR SONOPET CANISTER LF 5450-850-002

## (undated) DEVICE — BATTERY STRYKER NAVIGATION SYSTEM 6000-006-000

## (undated) DEVICE — SU PLAIN 6-0 G-1DA 18" 770G

## (undated) DEVICE — SYR 03ML LL W/O NDL 309657

## (undated) DEVICE — LINEN TOWEL PACK X5 5464

## (undated) DEVICE — ESU NDL COLORADO MICRO 3CM STR N103A

## (undated) DEVICE — BLADE KNIFE BEAVER SICKLE EDGE 5.0MM 377300

## (undated) DEVICE — TUBING SET ASPIRATION W/EXT SONOPET  LF 5450-850-003

## (undated) DEVICE — SOL NACL 0.9% INJ 1000ML BAG 2B1324X

## (undated) DEVICE — BLADE KNIFE SURG 15 371115

## (undated) DEVICE — SPONGE RAY-TEC 4X8" 7318

## (undated) DEVICE — ESU PENCIL W/COATED BLADE E2450H

## (undated) DEVICE — ESU GROUND PAD ADULT W/CORD E7507

## (undated) DEVICE — BONE WAX 2.5GM W31G

## (undated) DEVICE — EYE PREP BETADINE 5% SOLUTION 30ML 0065-0411-30

## (undated) DEVICE — MASK PT REGISTRATION FOR STRYKER NAV SYSTEM 6001-386-000

## (undated) DEVICE — SUCTION MANIFOLD NEPTUNE 2 SYS 4 PORT 0702-020-000

## (undated) DEVICE — PEN MARKING SKIN VISIMARK 1424SR

## (undated) DEVICE — GLOVE PROTEXIS POWDER FREE SMT 8.0  2D72PT80X

## (undated) DEVICE — SOL NACL 0.9% IRRIG 500ML BOTTLE 2F7123

## (undated) DEVICE — DRAPE STERI TOWEL LG 1010

## (undated) DEVICE — GLOVE PROTEXIS MICRO 7.5  2D73PM75

## (undated) DEVICE — NDL 25GA 2"  8881200441

## (undated) DEVICE — GELFILM 12.5 SQ SM 1X2"

## (undated) DEVICE — BUR STRK ROUND DIAMOND 5.0MM COARSE S2 TT DRIVE 5540-013-050

## (undated) DEVICE — BASIN SET SINGLE STERILE 13752-624

## (undated) DEVICE — SU VICRYL 5-0 P-3 18" UND J493G

## (undated) DEVICE — PACK ENT ENDOSCOPY CUSTOM ASC

## (undated) DEVICE — TIP ASPIRATOR PAYNER ANG SONOPET UNV 25KHZ 360D 5450-800-312

## (undated) DEVICE — PACK MINOR EYE CUSTOM ASC

## (undated) DEVICE — SOL NACL 0.9% IRRIG 1000ML BOTTLE 2F7124

## (undated) DEVICE — NDL 30GA 0.5" 305106

## (undated) DEVICE — ANTIFOG SOLUTION W/FOAM PAD 31142527

## (undated) DEVICE — DRAPE U SPLIT 74X120" 29440

## (undated) DEVICE — SPONGE COTTONOID 1/2X3" 80-1407

## (undated) DEVICE — SYR EAR BULB 3OZ 0035830

## (undated) DEVICE — KIT ENDO TURNOVER/PROCEDURE W/CLEAN A SCOPE LINERS 103888

## (undated) RX ORDER — ACETAMINOPHEN 325 MG/1
TABLET ORAL
Status: DISPENSED
Start: 2017-07-07

## (undated) RX ORDER — PROPOFOL 10 MG/ML
INJECTION, EMULSION INTRAVENOUS
Status: DISPENSED
Start: 2017-07-07

## (undated) RX ORDER — DEXAMETHASONE SODIUM PHOSPHATE 10 MG/ML
INJECTION, SOLUTION INTRAMUSCULAR; INTRAVENOUS
Status: DISPENSED
Start: 2017-07-07

## (undated) RX ORDER — ERYTHROMYCIN 5 MG/G
OINTMENT OPHTHALMIC
Status: DISPENSED
Start: 2017-07-07

## (undated) RX ORDER — FENTANYL CITRATE 50 UG/ML
INJECTION, SOLUTION INTRAMUSCULAR; INTRAVENOUS
Status: DISPENSED
Start: 2017-07-07

## (undated) RX ORDER — FENTANYL CITRATE 50 UG/ML
INJECTION, SOLUTION INTRAMUSCULAR; INTRAVENOUS
Status: DISPENSED
Start: 2025-08-14

## (undated) RX ORDER — LIDOCAINE HYDROCHLORIDE 10 MG/ML
INJECTION, SOLUTION EPIDURAL; INFILTRATION; INTRACAUDAL; PERINEURAL
Status: DISPENSED
Start: 2023-11-10

## (undated) RX ORDER — HYDROCODONE BITARTRATE AND ACETAMINOPHEN 5; 325 MG/1; MG/1
TABLET ORAL
Status: DISPENSED
Start: 2017-07-07

## (undated) RX ORDER — REMIFENTANIL HYDROCHLORIDE 1 MG/ML
INJECTION, POWDER, LYOPHILIZED, FOR SOLUTION INTRAVENOUS
Status: DISPENSED
Start: 2017-07-07

## (undated) RX ORDER — KETOROLAC TROMETHAMINE 30 MG/ML
INJECTION, SOLUTION INTRAMUSCULAR; INTRAVENOUS
Status: DISPENSED
Start: 2017-07-07

## (undated) RX ORDER — ONDANSETRON 2 MG/ML
INJECTION INTRAMUSCULAR; INTRAVENOUS
Status: DISPENSED
Start: 2017-07-07

## (undated) RX ORDER — OXYMETAZOLINE HYDROCHLORIDE 0.05 G/100ML
SPRAY NASAL
Status: DISPENSED
Start: 2017-07-07

## (undated) RX ORDER — TRIAMCINOLONE ACETONIDE 40 MG/ML
INJECTION, SUSPENSION INTRA-ARTICULAR; INTRAMUSCULAR
Status: DISPENSED
Start: 2023-11-10

## (undated) RX ORDER — GLYCOPYRROLATE 0.2 MG/ML
INJECTION INTRAMUSCULAR; INTRAVENOUS
Status: DISPENSED
Start: 2017-07-07

## (undated) RX ORDER — GABAPENTIN 300 MG/1
CAPSULE ORAL
Status: DISPENSED
Start: 2017-07-07